# Patient Record
Sex: MALE | Race: WHITE | NOT HISPANIC OR LATINO | Employment: OTHER | URBAN - METROPOLITAN AREA
[De-identification: names, ages, dates, MRNs, and addresses within clinical notes are randomized per-mention and may not be internally consistent; named-entity substitution may affect disease eponyms.]

---

## 2017-07-20 ENCOUNTER — HOSPITAL ENCOUNTER (OUTPATIENT)
Dept: SLEEP MEDICINE | Facility: HOSPITAL | Age: 72
Discharge: HOME | End: 2017-07-20

## 2017-07-20 PROCEDURE — 99214 OFFICE O/P EST MOD 30 MIN: CPT | Performed by: INTERNAL MEDICINE

## 2017-07-23 PROBLEM — G47.33 OSA (OBSTRUCTIVE SLEEP APNEA): Status: ACTIVE | Noted: 2017-07-23

## 2018-07-09 ENCOUNTER — PREP FOR SURGERY (OUTPATIENT)
Dept: OTHER | Facility: HOSPITAL | Age: 73
End: 2018-07-09

## 2018-07-09 DIAGNOSIS — Z86.010 HX OF COLONIC POLYPS: ICD-10-CM

## 2018-07-09 DIAGNOSIS — Z83.71 FH: COLON POLYPS: ICD-10-CM

## 2018-07-09 DIAGNOSIS — Z80.0 FH: COLON CANCER: ICD-10-CM

## 2018-07-09 DIAGNOSIS — Z85.038 HX OF MALIGNANT NEOPLASM OF COLON: Primary | ICD-10-CM

## 2018-07-12 PROBLEM — Z83.71 FH: COLON POLYPS: Status: ACTIVE | Noted: 2018-07-12

## 2018-07-12 PROBLEM — Z86.010 HX OF COLONIC POLYPS: Status: ACTIVE | Noted: 2018-07-12

## 2018-07-12 PROBLEM — Z85.038 HX OF MALIGNANT NEOPLASM OF COLON: Status: ACTIVE | Noted: 2018-07-12

## 2018-07-12 PROBLEM — Z83.719 FH: COLON POLYPS: Status: ACTIVE | Noted: 2018-07-12

## 2018-07-12 PROBLEM — Z86.0100 HX OF COLONIC POLYPS: Status: ACTIVE | Noted: 2018-07-12

## 2018-07-12 PROBLEM — Z80.0 FH: COLON CANCER: Status: ACTIVE | Noted: 2018-07-12

## 2018-08-01 ENCOUNTER — TELEPHONE (OUTPATIENT)
Dept: GASTROENTEROLOGY | Facility: CLINIC | Age: 73
End: 2018-08-01

## 2018-08-01 NOTE — TELEPHONE ENCOUNTER
Left voice message for Dr Christine Jay MA, requesting cardiac clearance for patient to hold his Plavix for 5 days prior to a colonoscopy scheduled 8/17/18 with Dr Garcia.

## 2018-08-01 NOTE — TELEPHONE ENCOUNTER
----- Message from Ese Anand RN sent at 7/9/2018  3:57 PM EDT -----  Regarding: PLAVIX  Patient takes Plavix prescribed by Dr Flaco Stearns

## 2018-08-02 NOTE — TELEPHONE ENCOUNTER
Received a voice message from Misty GONZALES with Dr Stearns.  She said that Dr Stearns is on vacation but will return next week.  She will check with him regarding patient's Plavix and call back next week.

## 2018-08-08 NOTE — TELEPHONE ENCOUNTER
Spoke with patient and informed him that Dr Garcia recommends that he hold his Plavix for 5 days prior to his colonoscopy scheduled 8/17/18(and this was okayed by Dr Stearns).  Patient voiced understanding.

## 2018-08-08 NOTE — TELEPHONE ENCOUNTER
Received a voice message from Dr Christine Jay MA. Dr Stearns said that it is okay for patient to hold his Plavix for 5 days prior to his colonoscopy scheduled 8/17/18.

## 2018-08-17 ENCOUNTER — ANESTHESIA EVENT (OUTPATIENT)
Dept: GASTROENTEROLOGY | Facility: HOSPITAL | Age: 73
End: 2018-08-17

## 2018-08-17 ENCOUNTER — ANESTHESIA (OUTPATIENT)
Dept: GASTROENTEROLOGY | Facility: HOSPITAL | Age: 73
End: 2018-08-17

## 2018-08-17 ENCOUNTER — HOSPITAL ENCOUNTER (OUTPATIENT)
Facility: HOSPITAL | Age: 73
Setting detail: HOSPITAL OUTPATIENT SURGERY
Discharge: HOME | End: 2018-08-17
Attending: INTERNAL MEDICINE | Admitting: INTERNAL MEDICINE

## 2018-08-17 VITALS
SYSTOLIC BLOOD PRESSURE: 117 MMHG | DIASTOLIC BLOOD PRESSURE: 60 MMHG | BODY MASS INDEX: 29.2 KG/M2 | OXYGEN SATURATION: 97 % | HEART RATE: 48 BPM | WEIGHT: 197.19 LBS | HEIGHT: 69 IN | RESPIRATION RATE: 16 BRPM | TEMPERATURE: 98.1 F

## 2018-08-17 DIAGNOSIS — Z86.010 HX OF COLONIC POLYPS: ICD-10-CM

## 2018-08-17 DIAGNOSIS — Z85.038 HX OF MALIGNANT NEOPLASM OF COLON: ICD-10-CM

## 2018-08-17 DIAGNOSIS — Z83.71 FH: COLON POLYPS: ICD-10-CM

## 2018-08-17 DIAGNOSIS — Z80.0 FH: COLON CANCER: ICD-10-CM

## 2018-08-17 PROCEDURE — 25010000002 PHENYLEPHRINE PER 1 ML: Performed by: ANESTHESIOLOGY

## 2018-08-17 PROCEDURE — 25010000002 PROPOFOL 10 MG/ML EMULSION: Performed by: ANESTHESIOLOGY

## 2018-08-17 PROCEDURE — S0260 H&P FOR SURGERY: HCPCS | Performed by: INTERNAL MEDICINE

## 2018-08-17 PROCEDURE — 45380 COLONOSCOPY AND BIOPSY: CPT | Performed by: INTERNAL MEDICINE

## 2018-08-17 PROCEDURE — 88305 TISSUE EXAM BY PATHOLOGIST: CPT | Performed by: INTERNAL MEDICINE

## 2018-08-17 RX ORDER — LIDOCAINE HYDROCHLORIDE 20 MG/ML
INJECTION, SOLUTION INFILTRATION; PERINEURAL AS NEEDED
Status: DISCONTINUED | OUTPATIENT
Start: 2018-08-17 | End: 2018-08-17 | Stop reason: SURG

## 2018-08-17 RX ORDER — SODIUM CHLORIDE, SODIUM LACTATE, POTASSIUM CHLORIDE, CALCIUM CHLORIDE 600; 310; 30; 20 MG/100ML; MG/100ML; MG/100ML; MG/100ML
1000 INJECTION, SOLUTION INTRAVENOUS CONTINUOUS
Status: DISCONTINUED | OUTPATIENT
Start: 2018-08-17 | End: 2018-08-17 | Stop reason: HOSPADM

## 2018-08-17 RX ORDER — PROPOFOL 10 MG/ML
VIAL (ML) INTRAVENOUS CONTINUOUS PRN
Status: DISCONTINUED | OUTPATIENT
Start: 2018-08-17 | End: 2018-08-17 | Stop reason: SURG

## 2018-08-17 RX ORDER — PROPOFOL 10 MG/ML
VIAL (ML) INTRAVENOUS AS NEEDED
Status: DISCONTINUED | OUTPATIENT
Start: 2018-08-17 | End: 2018-08-17 | Stop reason: SURG

## 2018-08-17 RX ADMIN — PROPOFOL 300 MCG/KG/MIN: 10 INJECTION, EMULSION INTRAVENOUS at 08:42

## 2018-08-17 RX ADMIN — SODIUM CHLORIDE, POTASSIUM CHLORIDE, SODIUM LACTATE AND CALCIUM CHLORIDE 1000 ML: 600; 310; 30; 20 INJECTION, SOLUTION INTRAVENOUS at 08:19

## 2018-08-17 RX ADMIN — EPHEDRINE SULFATE 20 MG: 50 INJECTION INTRAMUSCULAR; INTRAVENOUS; SUBCUTANEOUS at 08:59

## 2018-08-17 RX ADMIN — LIDOCAINE HYDROCHLORIDE 40 MG: 20 INJECTION, SOLUTION INFILTRATION; PERINEURAL at 08:42

## 2018-08-17 RX ADMIN — SODIUM CHLORIDE, POTASSIUM CHLORIDE, SODIUM LACTATE AND CALCIUM CHLORIDE: 600; 310; 30; 20 INJECTION, SOLUTION INTRAVENOUS at 08:39

## 2018-08-17 RX ADMIN — EPHEDRINE SULFATE 10 MG: 50 INJECTION INTRAMUSCULAR; INTRAVENOUS; SUBCUTANEOUS at 08:47

## 2018-08-17 RX ADMIN — EPHEDRINE SULFATE 20 MG: 50 INJECTION INTRAMUSCULAR; INTRAVENOUS; SUBCUTANEOUS at 08:54

## 2018-08-17 RX ADMIN — PHENYLEPHRINE HYDROCHLORIDE 100 MCG: 10 INJECTION INTRAVENOUS at 08:59

## 2018-08-17 RX ADMIN — PROPOFOL 100 MG: 10 INJECTION, EMULSION INTRAVENOUS at 08:42

## 2018-08-17 NOTE — H&P
[facility]  Pre Procedure History & Physical    Chief Complaint:   History of colon cancer and colon polyps    Subjective     HPI:   Patient 73-year-old male with history of hypertension, hyperlipidemia, arthritis and colon cancer here for surveillance.  Patient reports no GI complaints here for colonoscopy    Past Medical History:   Past Medical History:   Diagnosis Date   • Arthritis    • Cancer (CMS/HCC)     COLON, PROSTATE, SKIN   • Hyperlipidemia    • Hypertension        Past Surgical History:  Past Surgical History:   Procedure Laterality Date   • CARDIAC SURGERY      STENT   • COLON SURGERY      COLON RESECTION, COLON CANCER REMOVAL   • PROSTATE SURGERY      CANCER   • SKIN BIOPSY      5X-BASAL CELL CARCINOMA       Family History:  History reviewed. No pertinent family history.    Social History:   reports that he quit smoking about 40 years ago. His smoking use included Cigarettes. He has a 7.50 pack-year smoking history. He has never used smokeless tobacco. He reports that he drinks alcohol. He reports that he does not use drugs.    Medications:   Prescriptions Prior to Admission   Medication Sig Dispense Refill Last Dose   • atenolol (TENORMIN) 25 MG tablet Take 25 mg by mouth Daily.   8/17/2018 at Unknown time   • atorvastatin (LIPITOR) 10 MG tablet Take 10 mg by mouth Daily.   8/17/2018 at Unknown time   • buPROPion SR (WELLBUTRIN SR) 150 MG 12 hr tablet Take 150 mg by mouth 2 (Two) Times a Day.   8/17/2018 at Unknown time   • ezetimibe (ZETIA) 10 MG tablet Take 10 mg by mouth Daily.   8/16/2018 at Unknown time   • pantoprazole (PROTONIX) 40 MG EC tablet Take 40 mg by mouth Daily.   8/17/2018 at Unknown time   • ramipril (ALTACE) 10 MG capsule Take 10 mg by mouth Daily.   8/16/2018 at Unknown time   • aspirin 81 MG chewable tablet Chew 81 mg Daily.   8/15/2018   • azithromycin (ZITHROMAX Z-ERIK) 250 MG tablet Take 2 tablets the first day, then 1 tablet daily for 4 days. 6 tablet 0  "   • benzonatate (TESSALON) 200 MG capsule Take 1 capsule by mouth 3 (Three) Times a Day As Needed for cough. 30 capsule 0    • Cholecalciferol (VITAMIN D-3) 1000 UNITS capsule Take 2,000 Units by mouth.   8/15/2018   • clopidogrel (PLAVIX) 75 MG tablet Take 75 mg by mouth Daily.   8/12/2018   • Coenzyme Q10 (CO Q 10) 10 MG capsule Take  by mouth.   8/15/2018   • folic acid (FOLVITE) 1 MG tablet Take 1 mg by mouth Daily.   8/15/2018   • glucosamine-chondroitin 500-400 MG capsule capsule Take  by mouth 3 (Three) Times a Day With Meals.   8/15/2018   • Multiple Vitamins-Minerals (CENTRUM SILVER ADULT 50+ PO) Take  by mouth.   8/15/2018   • Omega-3 Fatty Acids (FISH OIL) 1000 MG capsule capsule Take  by mouth Daily With Breakfast.   8/15/2018   • PROAIR RESPICLICK 108 (90 BASE) MCG/ACT inhaler 1-2 inhalations every 6-8 hours as needed 1 inhaler 0    • vitamin B-12 (CYANOCOBALAMIN) 100 MCG tablet Take 50 mcg by mouth Daily.   8/15/2018   • vitamin B-6 (PYRIDOXINE) 50 MG tablet Take 50 mg by mouth Daily.   8/15/2018       Allergies:  Iodine and Latex    ROS:    Pertinent items are noted in HPI     Objective     Blood pressure 138/76, pulse (!) 49, temperature 98.1 °F (36.7 °C), temperature source Oral, resp. rate 16, height 175.3 cm (69\"), weight 89.4 kg (197 lb 3 oz), SpO2 96 %.    Physical Exam   Constitutional: Pt is oriented to person, place, and time and well-developed, well-nourished, and in no distress.   Mouth/Throat: Oropharynx is clear and moist.   Neck: Normal range of motion.   Cardiovascular: Normal rate, regular rhythm and normal heart sounds.    Pulmonary/Chest: Effort normal and breath sounds normal.   Abdominal: Soft. Nontender  Skin: Skin is warm and dry.   Psychiatric: Mood, memory, affect and judgment normal.     Assessment/Plan     Diagnosis:  History colon cancer and colon polyps    Anticipated Surgical Procedure:  Colonoscopy    The risks, benefits, and alternatives of this procedure have been " discussed with the patient or the responsible party- the patient understands and agrees to proceed.

## 2018-08-17 NOTE — BRIEF OP NOTE
COLONOSCOPY  Progress Note    Jed Correia  8/17/2018    Pre-op Diagnosis:   Hx of colonic polyps [Z86.010]  FH: colon cancer [Z80.0]  FH: colon polyps [Z83.71]  Hx of malignant neoplasm of colon [Z85.038]       Post-Op Diagnosis Codes:     * Hx of colonic polyps [Z86.010]     * FH: colon cancer [Z80.0]     * FH: colon polyps [Z83.71]     * Hx of malignant neoplasm of colon [Z85.038]     * Colon polyp [K63.5]     * Internal hemorrhoids without complication [K64.8]    Procedure/CPT® Codes:      Procedure(s):  COLONOSCOPY TO CECUM/TI WITH POLYPECTOMY ( COLD BX)    Surgeon(s):  Moise Garcia MD    Anesthesia: Monitor Anesthesia Care    Staff:   Endo Technician: Ashwini Nicholson  Endo Nurse: Yenny Mullen RN    Estimated Blood Loss: minimal    Urine Voided: * No values recorded between 8/17/2018  8:40 AM and 8/17/2018  9:01 AM *    Specimens:                ID Type Source Tests Collected by Time   A : ASCENDING COLON POLYP (COLD BX) Polyp Large Intestine, Right / Ascending Colon TISSUE PATHOLOGY EXAM Moise Garcia MD 8/17/2018 0854         Drains:      Findings: Colon polyp, status post sigmoid resection and internal hemorrhoids    Complications: None      Moise Garcia MD     Date: 8/17/2018  Time: 9:01 AM

## 2018-08-17 NOTE — DISCHARGE INSTRUCTIONS
For the next 24 hours patient needs to be with a responsible adult.    For 24 hours DO NOT drive, operate machinery, appliances, drink alcohol, make important decisions or sign legal documents.    Start with a light or bland diet and advance to regular diet as tolerated.    Follow recommendations on procedure report provided by your doctor.    Call Dr. Garcia for problems [phone]    Problems may include but not limited to: large amounts of bleeding, trouble breathing, repeated vomiting, severe unrelieved pain, fever or chills.

## 2018-08-17 NOTE — ANESTHESIA PREPROCEDURE EVALUATION
Anesthesia Evaluation     Patient summary reviewed and Nursing notes reviewed                Airway   Mallampati: II  TM distance: >3 FB  Neck ROM: full  No difficulty expected  Dental - normal exam     Pulmonary - normal exam   (+) a smoker Former, sleep apnea,   Cardiovascular     Rhythm: regular  Rate: abnormal    (+) hypertension, CAD, cardiac stents more than 12 months ago hyperlipidemia,     ROS comment: 2 stents in series (3 yrs ago)  PE comment: SB/rate 46    Neuro/Psych  GI/Hepatic/Renal/Endo      Musculoskeletal     Abdominal  - normal exam   Substance History      OB/GYN          Other   (+) arthritis   history of cancer      Other Comment: Hx of prostate and colon CA                Anesthesia Plan    ASA 3     MAC     intravenous induction   Anesthetic plan and risks discussed with patient.

## 2018-08-17 NOTE — ANESTHESIA POSTPROCEDURE EVALUATION
Patient: Jed Correia    Procedure Summary     Date:  08/17/18 Room / Location:   [facility]    Anesthesia Start:  0839 Anesthesia Stop:  0906    Procedure:  COLONOSCOPY TO CECUM/TI WITH POLYPECTOMY ( COLD BX) (N/A ) Diagnosis:       Hx of colonic polyps      FH: colon cancer      FH: colon polyps      Hx of malignant neoplasm of colon      Colon polyp      Internal hemorrhoids without complication      (Hx of colonic polyps [Z86.010])      (FH: colon cancer [Z80.0])      (FH: colon polyps [Z83.71])      (Hx of malignant neoplasm of colon [Z85.038])    Surgeon:  Moise Garcia MD Provider:  Mario Mendez MD    Anesthesia Type:  MAC ASA Status:  3          Anesthesia Type: MAC  Last vitals  BP   138/76 (08/17/18 0810)   Temp   36.7 °C (98.1 °F) (08/17/18 0810)   Pulse   55 (08/17/18 0905)   Resp   16 (08/17/18 0905)     SpO2   97 % (08/17/18 0905)     Post Anesthesia Care and Evaluation    Patient location during evaluation: PHASE II  Patient participation: complete - patient participated  Level of consciousness: awake and alert  Pain management: adequate  Airway patency: patent  Anesthetic complications: No anesthetic complications  PONV Status: none  Cardiovascular status: acceptable  Respiratory status: acceptable  Hydration status: acceptable

## 2018-08-20 LAB
CYTO UR: NORMAL
LAB AP CASE REPORT: NORMAL
PATH REPORT.FINAL DX SPEC: NORMAL
PATH REPORT.GROSS SPEC: NORMAL

## 2018-08-30 ENCOUNTER — TELEPHONE (OUTPATIENT)
Dept: GASTROENTEROLOGY | Facility: CLINIC | Age: 73
End: 2018-08-30

## 2018-08-30 NOTE — TELEPHONE ENCOUNTER
----- Message from Moise Garcia MD sent at 8/27/2018 10:42 AM EDT -----  Polyp benign adenoma.  Repeat colonoscopy 5 years as discussed.

## 2018-08-30 NOTE — TELEPHONE ENCOUNTER
Patient's health maintenance record updated to reflect the need to repeat colonoscopy in 5 years.

## 2019-08-29 ENCOUNTER — OFFICE VISIT (OUTPATIENT)
Dept: SLEEP MEDICINE | Facility: HOSPITAL | Age: 74
End: 2019-08-29

## 2019-08-29 VITALS — WEIGHT: 207.4 LBS | HEIGHT: 69 IN | BODY MASS INDEX: 30.72 KG/M2

## 2019-08-29 DIAGNOSIS — G47.39 POSITIONAL SLEEP APNEA: Primary | ICD-10-CM

## 2019-08-29 PROCEDURE — G0463 HOSPITAL OUTPT CLINIC VISIT: HCPCS

## 2019-08-29 PROCEDURE — 99213 OFFICE O/P EST LOW 20 MIN: CPT | Performed by: INTERNAL MEDICINE

## 2019-08-29 NOTE — PROGRESS NOTES
"[facility] Up [facility] Note     Chief Complaint:  SHARA     Primary Care Physician: Myke Landaverde MD    Interval History:   I last saw the patient in 2017.  He is using an oral appliance for his obstructive sleep apnea.  He states he has been very stable with the OA device.  However, it has developed a crack.  He needs a replacement.  He goes to bed between 11 11:30 PM and awakens between 7 and 7:30 AM.  He will use the bathroom during the nighttime.  [place] Sleepiness Scale is normal at 5.    Review of Systems:    A complete review of systems was done and all were negative with the exception of the above    Social History:    Social History     Socioeconomic History   • Marital status:      Spouse name: Not on file   • Number of children: Not on file   • Years of education: Not on file   • Highest education level: Not on file   Tobacco Use   • Smoking status: Former Smoker     Packs/day: 0.50     Years: 15.00     Pack years: 7.50     Types: Cigarettes     Last attempt to quit:      Years since quittin.6   • Smokeless tobacco: Never Used   • Tobacco comment: QUIT 30 YEARS AGO   Substance and Sexual Activity   • Alcohol use: Yes     Comment: SOCIALLY   • Drug use: No   • Sexual activity: Defer       Allergies:  Iodine and Latex     Medication Review: His list was reviewed.      Vital Signs:    Vitals:    19 1151   Weight: 94.1 kg (207 lb 6.4 oz)   Height: 175.3 cm (69\")     Body mass index is 30.63 kg/m².    Physical Exam:    Constitutional:  Well developed 74 y.o. male that appears in no apparent distress.  Awake & oriented times 3.  Normal mood with normal recent and remote memory and normal judgement.  Eyes:  Conjunctivae normal.  Oropharynx:  moist mucous membranes without exudate and a large tongue and normal uvula and mild-moderate narrowing of the posterior pharyngeal opening and class III MP airway     Results Review: Overnight polysomnogram 2009, weight 199 " pounds, AHI for total sleep time was normal at 4.6 events per hour.  When supine for 99 minutes, AHI was moderately abnormal at 16 events per hour.  No sleep-related hypoxia noted.       Impression:   Moderate severity obstructive sleep apnea in the supine position that appears to be adequately treated with an oral appliance and no complaints of hypersomnolence.    Plan:  Good sleep hygiene measures should be maintained.  Weight loss would be beneficial in this patient who is obese by BMI.  The patient is benefiting from the treatment being provided.     The patient states he needs a new oral appliance since his is cracking.  Orders for a new oral appliance sent to Dr. Rodolfo Bocanegra.  Dr Bocanegra reported that the patient needs an updated home sleep study.  A home sleep study will be scheduled.  A repeat home sleep study should be performed once the new oral appliance is fabricated.    The patient will call for any problems and will follow up as needed       Nayan Finney MD  Sleep Medicine  08/29/19  12:14 PM

## 2019-08-31 PROBLEM — G47.39 POSITIONAL SLEEP APNEA: Status: ACTIVE | Noted: 2017-07-23

## 2019-09-03 ENCOUNTER — TRANSCRIBE ORDERS (OUTPATIENT)
Dept: SLEEP MEDICINE | Facility: HOSPITAL | Age: 74
End: 2019-09-03

## 2019-09-03 DIAGNOSIS — G47.33 OSA (OBSTRUCTIVE SLEEP APNEA): Primary | ICD-10-CM

## 2019-09-18 ENCOUNTER — HOSPITAL ENCOUNTER (OUTPATIENT)
Dept: SLEEP MEDICINE | Facility: HOSPITAL | Age: 74
Discharge: HOME | End: 2019-09-18
Admitting: INTERNAL MEDICINE

## 2019-09-18 DIAGNOSIS — G47.33 OSA (OBSTRUCTIVE SLEEP APNEA): ICD-10-CM

## 2019-09-18 PROCEDURE — 95806 SLEEP STUDY UNATT&RESP EFFT: CPT

## 2019-09-18 PROCEDURE — 95806 SLEEP STUDY UNATT&RESP EFFT: CPT | Performed by: INTERNAL MEDICINE

## 2019-10-02 ENCOUNTER — TELEPHONE (OUTPATIENT)
Dept: SLEEP MEDICINE | Facility: HOSPITAL | Age: 74
End: 2019-10-02

## 2019-11-14 ENCOUNTER — OFFICE VISIT (OUTPATIENT)
Dept: SLEEP MEDICINE | Facility: HOSPITAL | Age: 74
End: 2019-11-14

## 2019-11-14 VITALS — WEIGHT: 209.8 LBS | HEIGHT: 69 IN | BODY MASS INDEX: 31.07 KG/M2

## 2019-11-14 DIAGNOSIS — G47.39 POSITIONAL SLEEP APNEA: Primary | ICD-10-CM

## 2019-11-14 PROCEDURE — 99213 OFFICE O/P EST LOW 20 MIN: CPT | Performed by: INTERNAL MEDICINE

## 2019-11-14 PROCEDURE — G0463 HOSPITAL OUTPT CLINIC VISIT: HCPCS

## 2019-11-14 NOTE — PROGRESS NOTES
"[facility] Note     Chief Complaint: Positional SHARA     Primary Care Physician: Myke Landaverde MD    Interval History:   I last saw the patient in August.  He is in need of a new oral appliance.  Repeat home sleep study performed 2019.  AHI for total sleep time normal at 2 events per hour.  When supine, mildly abnormal at 6.4 events per hour.  No sleep-related hypoxia.    The patient is unchanged from my previous encounter.    Review of Systems:    A complete review of systems was done and all were negative with the exception of knee pain which is new since last visit and he has an appointment to be checked.    Social History:    Social History     Socioeconomic History   • Marital status:      Spouse name: Not on file   • Number of children: Not on file   • Years of education: Not on file   • Highest education level: Not on file   Tobacco Use   • Smoking status: Former Smoker     Packs/day: 0.50     Years: 15.00     Pack years: 7.50     Types: Cigarettes     Last attempt to quit:      Years since quittin.8   • Smokeless tobacco: Never Used   • Tobacco comment: QUIT 30 YEARS AGO   Substance and Sexual Activity   • Alcohol use: Yes     Comment: SOCIALLY   • Drug use: No   • Sexual activity: Defer       Allergies:  Iodine and Latex     Medication Review:  Reviewed.      Vital Signs:    Vitals:    19 1112   Weight: 95.2 kg (209 lb 12.8 oz)   Height: 175.3 cm (69\")     Body mass index is 30.98 kg/m².    Physical Exam:    Constitutional:  Well developed 74 y.o. male that appears in no apparent distress.  Awake & oriented times 3.  Normal mood with normal recent and remote memory and normal judgement.  Eyes:  Conjunctivae normal.  Oropharynx:  moist mucous membranes without exudate and a large tongue and normal uvula and mild-moderate narrowing of posterior pharyngeal opening and class III MP airway     Results Review: I reviewed the results of the home sleep study " with him.     Impression:   Mild positional obstructive sleep apnea, normal AHI for total monitoring time, home sleep study 9/18/2019.  No significant complaints of hypersomnolence.    Plan:  Good sleep hygiene measures should be maintained.  Weight loss would be beneficial in this patient who is obese by BMI.       The patient will continue to work on obtaining a new oral appliance    The patient will call for any problems and will follow up as needed       Nayan Finney MD  Sleep Medicine  11/14/19  11:16 AM

## 2019-11-16 PROBLEM — G47.39 POSITIONAL SLEEP APNEA: Status: ACTIVE | Noted: 2019-09-18

## 2019-12-02 ENCOUNTER — OFFICE VISIT (OUTPATIENT)
Dept: ORTHOPEDIC SURGERY | Facility: CLINIC | Age: 74
End: 2019-12-02

## 2019-12-02 VITALS — TEMPERATURE: 98.7 F | WEIGHT: 211.8 LBS | HEIGHT: 69 IN | BODY MASS INDEX: 31.37 KG/M2

## 2019-12-02 DIAGNOSIS — M25.561 RIGHT KNEE PAIN, UNSPECIFIED CHRONICITY: Primary | ICD-10-CM

## 2019-12-02 PROCEDURE — 73562 X-RAY EXAM OF KNEE 3: CPT | Performed by: NURSE PRACTITIONER

## 2019-12-02 PROCEDURE — 99214 OFFICE O/P EST MOD 30 MIN: CPT | Performed by: NURSE PRACTITIONER

## 2019-12-02 RX ORDER — NITROGLYCERIN 0.4 MG/1
0.4 TABLET SUBLINGUAL
Refills: 1 | COMMUNITY
Start: 2019-10-15

## 2019-12-02 RX ORDER — LORATADINE 10 MG/1
10 TABLET ORAL DAILY
COMMUNITY
End: 2020-01-10

## 2019-12-02 RX ORDER — AMLODIPINE BESYLATE 2.5 MG/1
2.5 TABLET ORAL DAILY
Refills: 3 | COMMUNITY
Start: 2019-10-12 | End: 2022-12-21

## 2019-12-02 NOTE — PROGRESS NOTES
Patient: Jed Correia  YOB: 1945 74 y.o. male  Medical Record Number: [id number]    Chief Complaints:   Chief Complaint   Patient presents with   • Right Knee - Establish Care       History of Present Illness:Jed Correia is a 74 y.o. male who presents with new complaint of right knee pain.  Apparently the patient was in [place] about a month ago did quite a bit of walking as well as extended periods of time of sitting, he thinks he may have also inadvertently twisted his knee at that time.  At that point started with moderate to severe constant stabbing type pain in that knee with clicking swelling feelings of instability worse with standing walking, better with ice.    Allergies:   Allergies   Allergen Reactions   • Iodine Rash   • Latex Rash       Medications:   Current Outpatient Medications   Medication Sig Dispense Refill   • amLODIPine (NORVASC) 2.5 MG tablet Take  by mouth Daily.  3   • aspirin 81 MG chewable tablet Chew 325 mg Daily.     • atorvastatin (LIPITOR) 10 MG tablet Take 10 mg by mouth Daily.     • Cholecalciferol (VITAMIN D-3) 1000 UNITS capsule Take 2,000 Units by mouth.     • clopidogrel (PLAVIX) 75 MG tablet Take 75 mg by mouth Daily.     • Coenzyme Q10 (CO Q 10) 10 MG capsule Take  by mouth.     • ezetimibe (ZETIA) 10 MG tablet Take 10 mg by mouth Daily.     • folic acid (FOLVITE) 1 MG tablet Take 1 mg by mouth Daily.     • glucosamine-chondroitin 500-400 MG capsule capsule Take  by mouth 3 (Three) Times a Day With Meals.     • loratadine (CLARITIN) 10 MG tablet Take 10 mg by mouth Daily.     • Multiple Vitamins-Minerals (CENTRUM SILVER ADULT 50+ PO) Take  by mouth.     • nitroglycerin (NITROSTAT) 0.4 MG SL tablet PLACE 1 T UNDER TONGUE EVERY 6 MINUTES PRN FOR CHEST PAIN  1   • Omega-3 Fatty Acids (FISH OIL) 1000 MG capsule capsule Take  by mouth Daily With Breakfast.     • pantoprazole (PROTONIX) 40 MG EC tablet Take 40 mg by mouth Daily.     • Probiotic Product (PROBIOTIC DAILY  "PO) Take  by mouth.     • ramipril (ALTACE) 10 MG capsule Take 10 mg by mouth Daily.     • vitamin B-12 (CYANOCOBALAMIN) 100 MCG tablet Take 50 mcg by mouth Daily.     • vitamin B-6 (PYRIDOXINE) 50 MG tablet Take 50 mg by mouth Daily.     • atenolol (TENORMIN) 25 MG tablet Take 25 mg by mouth Daily.     • azithromycin (ZITHROMAX Z-ERIK) 250 MG tablet Take 2 tablets the first day, then 1 tablet daily for 4 days. 6 tablet 0   • benzonatate (TESSALON) 200 MG capsule Take 1 capsule by mouth 3 (Three) Times a Day As Needed for cough. 30 capsule 0   • buPROPion SR (WELLBUTRIN SR) 150 MG 12 hr tablet Take 150 mg by mouth 2 (Two) Times a Day.     • PROAIR RESPICLICK 108 (90 BASE) MCG/ACT inhaler 1-2 inhalations every 6-8 hours as needed 1 inhaler 0     No current facility-administered medications for this visit.          The following portions of the patient's history were reviewed and updated as appropriate: allergies, current medications, past family history, past medical history, past social history, past surgical history and problem list.    Review of Systems:   A 14 point review of systems was performed. All systems negative except pertinent positives/negative listed in HPI above    Physical Exam:   Vitals:    12/02/19 0926   Temp: 98.7 °F (37.1 °C)   TempSrc: Temporal   Weight: 96.1 kg (211 lb 12.8 oz)   Height: 175.3 cm (69\")   PainSc:   5   PainLoc: Knee       General: A and O x 3, ASA, NAD    SCLERA:    Normal    DENTITION:   Normal  Skin clear no unusual lesions noted  Right knee patient does have trace amount of effusion noted with 110 degrees flexion neutral and extension with a positive medial Taye negative Lockman calf soft nontender    Radiology:  Xrays 3views (ap,lateral, sunrise) reviewed today secondary to pain and show some mild arthritic changes.  No compared to views available    Assessment/Plan:  Right knee pain following injury with mechanical symptoms    We will proceed with an MRI of the right " knee to further evaluate and patient will call couple days after regarding results and treatment options

## 2019-12-06 ENCOUNTER — HOSPITAL ENCOUNTER (OUTPATIENT)
Dept: MRI IMAGING | Facility: HOSPITAL | Age: 74
Discharge: HOME | End: 2019-12-06
Admitting: NURSE PRACTITIONER

## 2019-12-06 DIAGNOSIS — M25.561 RIGHT KNEE PAIN, UNSPECIFIED CHRONICITY: ICD-10-CM

## 2019-12-06 PROCEDURE — 73721 MRI JNT OF LWR EXTRE W/O DYE: CPT

## 2019-12-20 ENCOUNTER — CONSULT (OUTPATIENT)
Dept: ORTHOPEDIC SURGERY | Facility: CLINIC | Age: 74
End: 2019-12-20

## 2019-12-20 VITALS — HEIGHT: 69 IN | TEMPERATURE: 98.5 F | WEIGHT: 207.8 LBS | BODY MASS INDEX: 30.78 KG/M2

## 2019-12-20 DIAGNOSIS — S83.241D OTHER TEAR OF MEDIAL MENISCUS OF RIGHT KNEE AS CURRENT INJURY, SUBSEQUENT ENCOUNTER: Primary | ICD-10-CM

## 2019-12-20 PROBLEM — S83.241A TEAR OF MEDIAL MENISCUS OF RIGHT KNEE, CURRENT: Status: ACTIVE | Noted: 2019-12-20

## 2019-12-20 PROCEDURE — 99214 OFFICE O/P EST MOD 30 MIN: CPT | Performed by: ORTHOPAEDIC SURGERY

## 2019-12-20 RX ORDER — CEFAZOLIN SODIUM 2 G/100ML
2 INJECTION, SOLUTION INTRAVENOUS ONCE
Status: CANCELLED | OUTPATIENT
Start: 2020-01-13 | End: 2019-12-20

## 2019-12-20 NOTE — PROGRESS NOTES
New right Knee      Patient: Jed Correia        YOB: 1945    Medical Record Number: [id number]        Chief Complaints: right knee pain      History of Present Illness: This is a   74-year-old active male who presents complaining of several week history of right knee pain does not recall a particular event or injury that started this his symptoms are moderate to severe constant stabbing aching swelling clicking worse with standing walking and sleeping somewhat better with ice he is retired his past medical history is remarkable for coronary artery disease hyperlipidemia hypertension: Prostate and skin cancer and arthritis      Allergies:   Allergies   Allergen Reactions   • Iodine Rash   • Latex Rash       Medications:   Home Medications:  Current Outpatient Medications on File Prior to Visit   Medication Sig   • amLODIPine (NORVASC) 2.5 MG tablet Take  by mouth Daily.   • aspirin 81 MG chewable tablet Chew 325 mg Daily.   • atenolol (TENORMIN) 25 MG tablet Take 25 mg by mouth Daily.   • atorvastatin (LIPITOR) 10 MG tablet Take 10 mg by mouth Daily.   • azithromycin (ZITHROMAX Z-ERIK) 250 MG tablet Take 2 tablets the first day, then 1 tablet daily for 4 days.   • benzonatate (TESSALON) 200 MG capsule Take 1 capsule by mouth 3 (Three) Times a Day As Needed for cough.   • buPROPion SR (WELLBUTRIN SR) 150 MG 12 hr tablet Take 150 mg by mouth 2 (Two) Times a Day.   • Cholecalciferol (VITAMIN D-3) 1000 UNITS capsule Take 2,000 Units by mouth.   • clopidogrel (PLAVIX) 75 MG tablet Take 75 mg by mouth Daily.   • Coenzyme Q10 (CO Q 10) 10 MG capsule Take  by mouth.   • ezetimibe (ZETIA) 10 MG tablet Take 10 mg by mouth Daily.   • folic acid (FOLVITE) 1 MG tablet Take 1 mg by mouth Daily.   • glucosamine-chondroitin 500-400 MG capsule capsule Take  by mouth 3 (Three) Times a Day With Meals.   • loratadine (CLARITIN) 10 MG tablet Take 10 mg by mouth Daily.   • Multiple Vitamins-Minerals (CENTRUM SILVER ADULT  50+ PO) Take  by mouth.   • nitroglycerin (NITROSTAT) 0.4 MG SL tablet PLACE 1 T UNDER TONGUE EVERY 6 MINUTES PRN FOR CHEST PAIN   • Omega-3 Fatty Acids (FISH OIL) 1000 MG capsule capsule Take  by mouth Daily With Breakfast.   • pantoprazole (PROTONIX) 40 MG EC tablet Take 40 mg by mouth Daily.   • PROAIR RESPICLICK 108 (90 BASE) MCG/ACT inhaler 1-2 inhalations every 6-8 hours as needed   • Probiotic Product (PROBIOTIC DAILY PO) Take  by mouth.   • ramipril (ALTACE) 10 MG capsule Take 10 mg by mouth Daily.   • vitamin B-12 (CYANOCOBALAMIN) 100 MCG tablet Take 50 mcg by mouth Daily.   • vitamin B-6 (PYRIDOXINE) 50 MG tablet Take 50 mg by mouth Daily.     No current facility-administered medications on file prior to visit.      Current Medications:  Scheduled Meds:  Continuous Infusions:  No current facility-administered medications for this visit.   PRN Meds:.    Past Medical History:   Diagnosis Date   • Arthritis    • Cancer (CMS/HCC)     COLON, PROSTATE, SKIN   • Hyperlipidemia    • Hypertension    • Positional sleep apnea 2019    Home sleep study.  AHI normal at 2/h for total monitoring time.  When supine, mildly abnormal at 6.4 events per hour.  No sleep-related hypoxia.        Past Surgical History:   Procedure Laterality Date   • CARDIAC SURGERY      STENT   • COLON SURGERY      COLON RESECTION, COLON CANCER REMOVAL   • COLONOSCOPY N/A 2018    Procedure: COLONOSCOPY TO CECUM/TI WITH POLYPECTOMY ( COLD BX);  Surgeon: Moise Garcia MD;  Location: [facility] ENDOSCOPY;  Service: Gastroenterology   • PROSTATE SURGERY      CANCER   • SKIN BIOPSY      5X-BASAL CELL CARCINOMA        Social History     Occupational History   • Not on file   Tobacco Use   • Smoking status: Former Smoker     Packs/day: 0.50     Years: 15.00     Pack years: 7.50     Types: Cigarettes     Last attempt to quit:      Years since quittin.9   • Smokeless tobacco: Never Used   • Tobacco comment: QUIT 30 YEARS AGO  "  Substance and Sexual Activity   • Alcohol use: Yes     Comment: SOCIALLY   • Drug use: No   • Sexual activity: Defer    Social History     Social History Narrative   • Not on file      No family history on file.          Review of Systems: 14 point review of systems are remarkable for the pertinent positives listed in the chart by the patient the remainder negative    Review of Systems      Physical Exam: 74 y.o. male  General Appearance:    Alert, cooperative, in no acute distress                 Vitals:    12/20/19 1052   Temp: 98.5 °F (36.9 °C)   TempSrc: Temporal   Weight: 94.3 kg (207 lb 12.8 oz)   Height: 175.3 cm (69\")      Patient is alert and read ×3 no acute distress appears her above-listed at height weight and age.  Affect is normal respiratory rate is normal unlabored. Heart rate regular rate rhythm, sclera, dentition and hearing are normal for the purpose of this exam.        Ortho Exam  Physical exam of the right  knee reveals no effusion no redness.  The patient does have tenderness about the medial l joint line.  No tenderness about the lateral l joint line.  A negative bounce home and a positive l medial Taye.    Patient has a stable ligamentous exam.  The patient has a negative Lachman and negative anterior drawer and a negative pivot shift.  Quads are reasonable and symmetric bilaterally.  Calf is soft and nontender.  There is no overlying skin changes no lymphedema lymphadenopathy.  Patient has good hip range of motion full symmetric and asymptomatic and a normal ankle exam.  She has good distal pulses and sensation distally is intact    Physical Exam: 74 y.o. male  General Appearance:    Alert, cooperative, in no acute distress                      Vitals:    12/20/19 1052   Temp: 98.5 °F (36.9 °C)   TempSrc: Temporal   Weight: 94.3 kg (207 lb 12.8 oz)   Height: 175.3 cm (69\")        Head:    Normocephalic, without obvious abnormality, atraumatic   Eyes:            conjunctivae and " sclerae normal, no pallor, corneas clear,    Ears:    Ears appear intact with no abnormalities noted   Throat:   No oral lesions, no thrush, oral mucosa moist   Neck:   No adenopathy, supple, trachea midline, no thyromegaly,    Back:     No kyphosis present, no scoliosis present, no skin lesions,      erythema or scars, no tenderness to percussion or                   palpation,   range of motion normal   Lungs:     Clear to auscultation,respirations regular, even and                  unlabored    Heart:    Regular rhythm and normal rate               Chest Wall:    No abnormalities observed               Pulses:   Pulses palpable and equal bilaterally   Skin:   No bleeding, bruising or rash   Lymph nodes:   No palpable adenopathy   Neurologic:   Appears neurologic intact       Procedures             Radiology:   AP, Lateral and merchant views of the right knee  were /reviewed to evauateknee pain.  These were taken by Gris Zavala I did review these he has some mild patellofemoral OA otherwise no acute bony pathology.  He also comes with an MRI which shows a flap tear of the posterior horn medial meniscus and body and some mild degenerative changes.  I have reviewed these and agree with the findings  Imaging Results (Most Recent)     None        Assessment/Plan:      Right knee pain with meniscus tear I think his exam and everything fits bothering him enough he wishes to proceed with arthroscopy.  We did discuss in detail risk benefits and alternatives The patient voiced understanding of the risks, benefits, and alternative forms of treatment that were discussed and the patient consents to proceed with the above listed surgery.  All risks, benefits and alternatives were discussed.  Risks including to but not exclusive to anesthetic complications, including death, MI, CVA, infection, bleeding DVT, fracture, residual pain and need for future surgery.  He understands these and agrees to proceed he does have coronary  artery disease and is currently on Plavix we will need preop clearance from his cardiologist and okay to stop his Plavix about 5 days before

## 2020-01-02 ENCOUNTER — TELEPHONE (OUTPATIENT)
Dept: ORTHOPEDIC SURGERY | Facility: CLINIC | Age: 75
End: 2020-01-02

## 2020-01-03 ENCOUNTER — LAB (OUTPATIENT)
Dept: LAB | Facility: HOSPITAL | Age: 75
End: 2020-01-03

## 2020-01-03 ENCOUNTER — TRANSCRIBE ORDERS (OUTPATIENT)
Dept: ADMINISTRATIVE | Facility: HOSPITAL | Age: 75
End: 2020-01-03

## 2020-01-03 DIAGNOSIS — Z01.818 PRE-OP TESTING: ICD-10-CM

## 2020-01-03 DIAGNOSIS — Z01.818 PRE-OP TESTING: Primary | ICD-10-CM

## 2020-01-03 LAB
ANION GAP SERPL CALCULATED.3IONS-SCNC: 10 MMOL/L (ref 5–15)
BASOPHILS # BLD AUTO: 0.07 10*3/MM3 (ref 0–0.2)
BASOPHILS NFR BLD AUTO: 0.9 % (ref 0–1.5)
BUN BLD-MCNC: 15 MG/DL (ref 8–23)
BUN/CREAT SERPL: 20.5 (ref 7–25)
CALCIUM SPEC-SCNC: 9.5 MG/DL (ref 8.6–10.5)
CHLORIDE SERPL-SCNC: 103 MMOL/L (ref 98–107)
CO2 SERPL-SCNC: 28 MMOL/L (ref 22–29)
CREAT BLD-MCNC: 0.73 MG/DL (ref 0.76–1.27)
DEPRECATED RDW RBC AUTO: 46.3 FL (ref 37–54)
EOSINOPHIL # BLD AUTO: 0.22 10*3/MM3 (ref 0–0.4)
EOSINOPHIL NFR BLD AUTO: 2.7 % (ref 0.3–6.2)
ERYTHROCYTE [DISTWIDTH] IN BLOOD BY AUTOMATED COUNT: 13.2 % (ref 12.3–15.4)
GFR SERPL CREATININE-BSD FRML MDRD: 105 ML/MIN/1.73
GLUCOSE BLD-MCNC: 120 MG/DL (ref 65–99)
HCT VFR BLD AUTO: 43.5 % (ref 37.5–51)
HGB BLD-MCNC: 14.2 G/DL (ref 13–17.7)
IMM GRANULOCYTES # BLD AUTO: 0.02 10*3/MM3 (ref 0–0.05)
IMM GRANULOCYTES NFR BLD AUTO: 0.2 % (ref 0–0.5)
LYMPHOCYTES # BLD AUTO: 1.82 10*3/MM3 (ref 0.7–3.1)
LYMPHOCYTES NFR BLD AUTO: 22.6 % (ref 19.6–45.3)
MCH RBC QN AUTO: 30.9 PG (ref 26.6–33)
MCHC RBC AUTO-ENTMCNC: 32.6 G/DL (ref 31.5–35.7)
MCV RBC AUTO: 94.8 FL (ref 79–97)
MONOCYTES # BLD AUTO: 1.01 10*3/MM3 (ref 0.1–0.9)
MONOCYTES NFR BLD AUTO: 12.5 % (ref 5–12)
NEUTROPHILS # BLD AUTO: 4.91 10*3/MM3 (ref 1.7–7)
NEUTROPHILS NFR BLD AUTO: 61.1 % (ref 42.7–76)
NRBC BLD AUTO-RTO: 0 /100 WBC (ref 0–0.2)
PLATELET # BLD AUTO: 222 10*3/MM3 (ref 140–450)
PMV BLD AUTO: 9.4 FL (ref 6–12)
POTASSIUM BLD-SCNC: 4.6 MMOL/L (ref 3.5–5.2)
RBC # BLD AUTO: 4.59 10*6/MM3 (ref 4.14–5.8)
SODIUM BLD-SCNC: 141 MMOL/L (ref 136–145)
WBC NRBC COR # BLD: 8.05 10*3/MM3 (ref 3.4–10.8)

## 2020-01-03 PROCEDURE — 85025 COMPLETE CBC W/AUTO DIFF WBC: CPT

## 2020-01-03 PROCEDURE — 36415 COLL VENOUS BLD VENIPUNCTURE: CPT

## 2020-01-03 PROCEDURE — 80048 BASIC METABOLIC PNL TOTAL CA: CPT

## 2020-01-06 NOTE — TELEPHONE ENCOUNTER
Wanted to make sure you got my message this jessica needs to be done at [facility] on Friday instead of [facility].  [facility] will not do this patient in outpatient setting

## 2020-01-10 ENCOUNTER — TELEPHONE (OUTPATIENT)
Dept: ORTHOPEDIC SURGERY | Facility: CLINIC | Age: 75
End: 2020-01-10

## 2020-01-10 RX ORDER — VENLAFAXINE 75 MG/1
75 TABLET ORAL DAILY
COMMUNITY

## 2020-01-10 NOTE — TELEPHONE ENCOUNTER
There is a telephone message in Care everywhere from cardiologist stating that he is cleared.  They also noted that they faxed a written clearance to office.  I do not see in under media, but check with [place] to see if it is in electronic fax

## 2020-01-10 NOTE — TELEPHONE ENCOUNTER
Have contacted [facility].  They have faxed over a letter of clearance.  Clearance letter has been scanned under media and outpatient surgery has been notified

## 2020-01-13 ENCOUNTER — ANESTHESIA (OUTPATIENT)
Dept: PERIOP | Facility: HOSPITAL | Age: 75
End: 2020-01-13

## 2020-01-13 ENCOUNTER — ANESTHESIA EVENT (OUTPATIENT)
Dept: PERIOP | Facility: HOSPITAL | Age: 75
End: 2020-01-13

## 2020-01-13 ENCOUNTER — HOSPITAL ENCOUNTER (OUTPATIENT)
Facility: HOSPITAL | Age: 75
Setting detail: HOSPITAL OUTPATIENT SURGERY
Discharge: HOME | End: 2020-01-13
Attending: ORTHOPAEDIC SURGERY | Admitting: ORTHOPAEDIC SURGERY

## 2020-01-13 VITALS
HEART RATE: 72 BPM | OXYGEN SATURATION: 97 % | TEMPERATURE: 98 F | RESPIRATION RATE: 16 BRPM | DIASTOLIC BLOOD PRESSURE: 75 MMHG | BODY MASS INDEX: 30.9 KG/M2 | WEIGHT: 209.22 LBS | SYSTOLIC BLOOD PRESSURE: 162 MMHG

## 2020-01-13 DIAGNOSIS — S83.241D OTHER TEAR OF MEDIAL MENISCUS OF RIGHT KNEE AS CURRENT INJURY, SUBSEQUENT ENCOUNTER: ICD-10-CM

## 2020-01-13 PROCEDURE — 25010000002 PROPOFOL 10 MG/ML EMULSION: Performed by: NURSE ANESTHETIST, CERTIFIED REGISTERED

## 2020-01-13 PROCEDURE — 25010000002 FENTANYL CITRATE (PF) 100 MCG/2ML SOLUTION: Performed by: ANESTHESIOLOGY

## 2020-01-13 PROCEDURE — 25010000002 DEXAMETHASONE PER 1 MG: Performed by: NURSE ANESTHETIST, CERTIFIED REGISTERED

## 2020-01-13 PROCEDURE — 25010000002 EPINEPHRINE PER 0.1 MG: Performed by: ORTHOPAEDIC SURGERY

## 2020-01-13 PROCEDURE — 25010000002 HYDROMORPHONE PER 4 MG: Performed by: ANESTHESIOLOGY

## 2020-01-13 PROCEDURE — 25010000002 FENTANYL CITRATE (PF) 100 MCG/2ML SOLUTION: Performed by: NURSE ANESTHETIST, CERTIFIED REGISTERED

## 2020-01-13 PROCEDURE — 25010000002 ONDANSETRON PER 1 MG: Performed by: NURSE ANESTHETIST, CERTIFIED REGISTERED

## 2020-01-13 PROCEDURE — 25010000003 CEFAZOLIN IN DEXTROSE 2-4 GM/100ML-% SOLUTION: Performed by: ORTHOPAEDIC SURGERY

## 2020-01-13 PROCEDURE — 29880 ARTHRS KNE SRG MNISECTMY M&L: CPT | Performed by: ORTHOPAEDIC SURGERY

## 2020-01-13 PROCEDURE — 25010000002 HYDRALAZINE PER 20 MG: Performed by: ANESTHESIOLOGY

## 2020-01-13 PROCEDURE — 25010000002 KETOROLAC TROMETHAMINE PER 15 MG: Performed by: NURSE ANESTHETIST, CERTIFIED REGISTERED

## 2020-01-13 RX ORDER — KETOROLAC TROMETHAMINE 30 MG/ML
INJECTION, SOLUTION INTRAMUSCULAR; INTRAVENOUS AS NEEDED
Status: DISCONTINUED | OUTPATIENT
Start: 2020-01-13 | End: 2020-01-13 | Stop reason: SURG

## 2020-01-13 RX ORDER — ONDANSETRON 2 MG/ML
INJECTION INTRAMUSCULAR; INTRAVENOUS AS NEEDED
Status: DISCONTINUED | OUTPATIENT
Start: 2020-01-13 | End: 2020-01-13 | Stop reason: SURG

## 2020-01-13 RX ORDER — LIDOCAINE HYDROCHLORIDE 20 MG/ML
INJECTION, SOLUTION INFILTRATION; PERINEURAL AS NEEDED
Status: DISCONTINUED | OUTPATIENT
Start: 2020-01-13 | End: 2020-01-13 | Stop reason: SURG

## 2020-01-13 RX ORDER — OXYCODONE AND ACETAMINOPHEN 7.5; 325 MG/1; MG/1
1 TABLET ORAL EVERY 4 HOURS PRN
Status: DISCONTINUED | OUTPATIENT
Start: 2020-01-13 | End: 2020-01-13 | Stop reason: HOSPADM

## 2020-01-13 RX ORDER — HYDROMORPHONE HYDROCHLORIDE 1 MG/ML
0.5 INJECTION, SOLUTION INTRAMUSCULAR; INTRAVENOUS; SUBCUTANEOUS
Status: DISCONTINUED | OUTPATIENT
Start: 2020-01-13 | End: 2020-01-13 | Stop reason: HOSPADM

## 2020-01-13 RX ORDER — SODIUM CHLORIDE, SODIUM LACTATE, POTASSIUM CHLORIDE, CALCIUM CHLORIDE 600; 310; 30; 20 MG/100ML; MG/100ML; MG/100ML; MG/100ML
9 INJECTION, SOLUTION INTRAVENOUS CONTINUOUS
Status: DISCONTINUED | OUTPATIENT
Start: 2020-01-13 | End: 2020-01-13 | Stop reason: HOSPADM

## 2020-01-13 RX ORDER — FAMOTIDINE 10 MG/ML
20 INJECTION, SOLUTION INTRAVENOUS ONCE
Status: COMPLETED | OUTPATIENT
Start: 2020-01-13 | End: 2020-01-13

## 2020-01-13 RX ORDER — EPHEDRINE SULFATE 50 MG/ML
5 INJECTION, SOLUTION INTRAVENOUS ONCE AS NEEDED
Status: DISCONTINUED | OUTPATIENT
Start: 2020-01-13 | End: 2020-01-13 | Stop reason: HOSPADM

## 2020-01-13 RX ORDER — HYDROCODONE BITARTRATE AND ACETAMINOPHEN 5; 325 MG/1; MG/1
1 TABLET ORAL EVERY 4 HOURS PRN
Qty: 40 TABLET | Refills: 0 | Status: SHIPPED | OUTPATIENT
Start: 2020-01-13 | End: 2022-12-21

## 2020-01-13 RX ORDER — FENTANYL CITRATE 50 UG/ML
INJECTION, SOLUTION INTRAMUSCULAR; INTRAVENOUS AS NEEDED
Status: DISCONTINUED | OUTPATIENT
Start: 2020-01-13 | End: 2020-01-13 | Stop reason: SURG

## 2020-01-13 RX ORDER — DEXAMETHASONE SODIUM PHOSPHATE 10 MG/ML
INJECTION INTRAMUSCULAR; INTRAVENOUS AS NEEDED
Status: DISCONTINUED | OUTPATIENT
Start: 2020-01-13 | End: 2020-01-13 | Stop reason: SURG

## 2020-01-13 RX ORDER — PROMETHAZINE HYDROCHLORIDE 25 MG/ML
6.25 INJECTION, SOLUTION INTRAMUSCULAR; INTRAVENOUS ONCE AS NEEDED
Status: DISCONTINUED | OUTPATIENT
Start: 2020-01-13 | End: 2020-01-13 | Stop reason: HOSPADM

## 2020-01-13 RX ORDER — PROPOFOL 10 MG/ML
VIAL (ML) INTRAVENOUS AS NEEDED
Status: DISCONTINUED | OUTPATIENT
Start: 2020-01-13 | End: 2020-01-13 | Stop reason: SURG

## 2020-01-13 RX ORDER — FLUMAZENIL 0.1 MG/ML
0.2 INJECTION INTRAVENOUS AS NEEDED
Status: DISCONTINUED | OUTPATIENT
Start: 2020-01-13 | End: 2020-01-13 | Stop reason: HOSPADM

## 2020-01-13 RX ORDER — ONDANSETRON 2 MG/ML
4 INJECTION INTRAMUSCULAR; INTRAVENOUS ONCE AS NEEDED
Status: DISCONTINUED | OUTPATIENT
Start: 2020-01-13 | End: 2020-01-13 | Stop reason: HOSPADM

## 2020-01-13 RX ORDER — PROMETHAZINE HYDROCHLORIDE 25 MG/1
25 SUPPOSITORY RECTAL ONCE AS NEEDED
Status: DISCONTINUED | OUTPATIENT
Start: 2020-01-13 | End: 2020-01-13 | Stop reason: HOSPADM

## 2020-01-13 RX ORDER — SODIUM CHLORIDE 0.9 % (FLUSH) 0.9 %
3-10 SYRINGE (ML) INJECTION AS NEEDED
Status: DISCONTINUED | OUTPATIENT
Start: 2020-01-13 | End: 2020-01-13 | Stop reason: HOSPADM

## 2020-01-13 RX ORDER — MIDAZOLAM HYDROCHLORIDE 1 MG/ML
2 INJECTION INTRAMUSCULAR; INTRAVENOUS
Status: DISCONTINUED | OUTPATIENT
Start: 2020-01-13 | End: 2020-01-13 | Stop reason: HOSPADM

## 2020-01-13 RX ORDER — HYDRALAZINE HYDROCHLORIDE 20 MG/ML
5 INJECTION INTRAMUSCULAR; INTRAVENOUS
Status: DISCONTINUED | OUTPATIENT
Start: 2020-01-13 | End: 2020-01-13 | Stop reason: HOSPADM

## 2020-01-13 RX ORDER — HYDROCODONE BITARTRATE AND ACETAMINOPHEN 7.5; 325 MG/1; MG/1
1 TABLET ORAL ONCE AS NEEDED
Status: COMPLETED | OUTPATIENT
Start: 2020-01-13 | End: 2020-01-13

## 2020-01-13 RX ORDER — CEFAZOLIN SODIUM 2 G/100ML
2 INJECTION, SOLUTION INTRAVENOUS ONCE
Status: COMPLETED | OUTPATIENT
Start: 2020-01-13 | End: 2020-01-13

## 2020-01-13 RX ORDER — MIDAZOLAM HYDROCHLORIDE 1 MG/ML
1 INJECTION INTRAMUSCULAR; INTRAVENOUS
Status: DISCONTINUED | OUTPATIENT
Start: 2020-01-13 | End: 2020-01-13 | Stop reason: HOSPADM

## 2020-01-13 RX ORDER — FENTANYL CITRATE 50 UG/ML
100 INJECTION, SOLUTION INTRAMUSCULAR; INTRAVENOUS
Status: DISCONTINUED | OUTPATIENT
Start: 2020-01-13 | End: 2020-01-13 | Stop reason: HOSPADM

## 2020-01-13 RX ORDER — SODIUM CHLORIDE 0.9 % (FLUSH) 0.9 %
3 SYRINGE (ML) INJECTION EVERY 12 HOURS SCHEDULED
Status: DISCONTINUED | OUTPATIENT
Start: 2020-01-13 | End: 2020-01-13 | Stop reason: HOSPADM

## 2020-01-13 RX ORDER — FENTANYL CITRATE 50 UG/ML
50 INJECTION, SOLUTION INTRAMUSCULAR; INTRAVENOUS
Status: DISCONTINUED | OUTPATIENT
Start: 2020-01-13 | End: 2020-01-13 | Stop reason: HOSPADM

## 2020-01-13 RX ORDER — PROMETHAZINE HYDROCHLORIDE 25 MG/1
25 TABLET ORAL ONCE AS NEEDED
Status: DISCONTINUED | OUTPATIENT
Start: 2020-01-13 | End: 2020-01-13 | Stop reason: HOSPADM

## 2020-01-13 RX ORDER — LIDOCAINE HYDROCHLORIDE 10 MG/ML
0.5 INJECTION, SOLUTION EPIDURAL; INFILTRATION; INTRACAUDAL; PERINEURAL ONCE AS NEEDED
Status: DISCONTINUED | OUTPATIENT
Start: 2020-01-13 | End: 2020-01-13 | Stop reason: HOSPADM

## 2020-01-13 RX ADMIN — LIDOCAINE HYDROCHLORIDE 60 MG: 20 INJECTION, SOLUTION INFILTRATION; PERINEURAL at 10:19

## 2020-01-13 RX ADMIN — FENTANYL CITRATE 50 MCG: 50 INJECTION INTRAMUSCULAR; INTRAVENOUS at 10:26

## 2020-01-13 RX ADMIN — SODIUM CHLORIDE, POTASSIUM CHLORIDE, SODIUM LACTATE AND CALCIUM CHLORIDE 9 ML/HR: 600; 310; 30; 20 INJECTION, SOLUTION INTRAVENOUS at 11:17

## 2020-01-13 RX ADMIN — FENTANYL CITRATE 50 MCG: 50 INJECTION, SOLUTION INTRAMUSCULAR; INTRAVENOUS at 11:47

## 2020-01-13 RX ADMIN — FENTANYL CITRATE 50 MCG: 50 INJECTION, SOLUTION INTRAMUSCULAR; INTRAVENOUS at 11:36

## 2020-01-13 RX ADMIN — SODIUM CHLORIDE, POTASSIUM CHLORIDE, SODIUM LACTATE AND CALCIUM CHLORIDE 9 ML/HR: 600; 310; 30; 20 INJECTION, SOLUTION INTRAVENOUS at 09:44

## 2020-01-13 RX ADMIN — DEXAMETHASONE SODIUM PHOSPHATE 6 MG: 10 INJECTION INTRAMUSCULAR; INTRAVENOUS at 10:53

## 2020-01-13 RX ADMIN — HYDROCODONE BITARTRATE AND ACETAMINOPHEN 1 TABLET: 7.5; 325 TABLET ORAL at 11:37

## 2020-01-13 RX ADMIN — HYDROMORPHONE HYDROCHLORIDE 0.5 MG: 1 INJECTION, SOLUTION INTRAMUSCULAR; INTRAVENOUS; SUBCUTANEOUS at 12:02

## 2020-01-13 RX ADMIN — OXYCODONE HYDROCHLORIDE AND ACETAMINOPHEN 1 TABLET: 7.5; 325 TABLET ORAL at 13:04

## 2020-01-13 RX ADMIN — PROPOFOL 200 MG: 10 INJECTION, EMULSION INTRAVENOUS at 10:19

## 2020-01-13 RX ADMIN — ONDANSETRON HYDROCHLORIDE 4 MG: 2 SOLUTION INTRAMUSCULAR; INTRAVENOUS at 10:53

## 2020-01-13 RX ADMIN — HYDRALAZINE HYDROCHLORIDE 10 MG: 20 INJECTION INTRAMUSCULAR; INTRAVENOUS at 11:20

## 2020-01-13 RX ADMIN — CEFAZOLIN SODIUM 2 G: 2 INJECTION, SOLUTION INTRAVENOUS at 10:18

## 2020-01-13 RX ADMIN — KETOROLAC TROMETHAMINE 15 MG: 30 INJECTION, SOLUTION INTRAMUSCULAR; INTRAVENOUS at 10:55

## 2020-01-13 RX ADMIN — FENTANYL CITRATE 50 MCG: 50 INJECTION INTRAMUSCULAR; INTRAVENOUS at 10:43

## 2020-01-13 RX ADMIN — HYDRALAZINE HYDROCHLORIDE 10 MG: 20 INJECTION INTRAMUSCULAR; INTRAVENOUS at 11:34

## 2020-01-13 RX ADMIN — FAMOTIDINE 20 MG: 10 INJECTION INTRAVENOUS at 09:59

## 2020-01-13 NOTE — PERIOPERATIVE NURSING NOTE
Dr Leigh notified of current blood pressure 167/80, patient has remained in sinus ryhthm since admission to [facility].  Okay to send to phase 2, no need for cardiac folllow up.  Pain tolerable

## 2020-01-13 NOTE — PERIOPERATIVE NURSING NOTE
Dr asencio notified of patient's blood pressures since admission to [facility] after 20mg hydralazine iv.  Last bp 187/91.  Patient heartrate with brief tachycardia after raising head of the bed.  Remains in sinus rhythm.  heartrate in the 70s. Wait on additional blood pressure meds.

## 2020-01-13 NOTE — OP NOTE
Operative Note      Facility: [facility]  Patient Name: Jed Correia  YOB: 1945  Date: 1/13/2020  Medical Record Number: [id number]      Pre-op Diagnosis:   Medial meniscus tear right knee  Post-Op Diagnosis Codes:  Medial meniscus tear right knee, anterior horn lateral meniscus tear, grade 3 changes on the patella grade 2 changes medial compartment  Procedure(s):  Right KNEE ARTHROSCOPY, PARTIAL MEDIAL AND LATERAL MENISECTOMY, AND DEBRIDEMENT OF ARTHRITIS chondroplasty of the patellofemoral joint    Surgeon(s):  Mercedes Falk MD    Anesthesia: General  Anesthesiologist: Jed Leigh MD  CRNA: Suzy Shell CRNA    Staff:   Circulator: Melony Herzog RN  Scrub Person: Guera Ty    Assistants : none      Estimated Blood Loss: 5 cc    Specimens:    none     Drains: None    Findings: See Dictation    Complications: None      Indication for procedure: This patient has had a several month history of knee pain and has an exam and an MRI which are consistent with meniscal pathology. They understand all options and wished to proceed with arthroscopy.      Description of procedure: The patient was taken to the operating room. They were placed supine on the operating room table. After induction of adequate LMA anesthesia, IV antibiotics the underwent exam under anesthesia was symmetric full range of motion. Nonsterile tourniquet was applied patient was placed in the thigh brennan all prominent areas well padded and into the table dropped. The leg was prepped and draped in usual sterile fashion. Standard lateral incision was made with 11 blade. Blunt trocar penetrated into the joint scope followed in the evaluation began the patella appeared centrally within the trochlear groove he had marked synovitis throughout the knee and a large effusion upon entering the joint.  He did have marked degenerative changes patella felt to be grade 3 similarly on the trochlear groove.  I  then entered the medial compartment under spinal needle localization direct visualization a medial portal is established he had a complex tear of the medial meniscus involving a large horizontal cleavage component this was resected with various angled biters baskets motorized heather and ultimately the Apollo device.  He had grade 2 changes on the femur and the tibia.  Evaluate the notch the ACL PCL were intact.  I then entered the lateral compartment he had an anterior horn tear lateral meniscus tear which was debrided with a motorized shaver and the Apollo device the remainder was normal.  I then turned my attention patellofemoral joint gently debrided both sides of the joint             At this point everything was thoroughly irrigated it was suctioned all 3 compartments, the gutters the suprapatellar pouch were all evaluated there was no further acute pathology seen.  Everything was thoroughly irrigated it was injected with Marcaine Depo-Medrol.  The portals were closed with 3-0 nylon interrupted fashion.  Sterile dressings and Ace wraps were applied.  The patient tolerated the procedure well and was taken to recovery room in good condition.  All sponge and needle count were correct                    Date: 1/13/2020  Time: 11:31 AM

## 2020-01-13 NOTE — ANESTHESIA POSTPROCEDURE EVALUATION
Patient: Jed Correia    Procedure Summary     Date:  01/13/20 Room / Location:   [facility]    Anesthesia Start:  1017 Anesthesia Stop:  1117    Procedure:  KNEE ARTHROSCOPY, PARTIAL MEDIAL AND LATERAL MENISECTOMY, AND DEBRIDEMENT OF ARTHRITIS (Right Knee) Diagnosis:       Other tear of medial meniscus of right knee as current injury, subsequent encounter      (Other tear of medial meniscus of right knee as current injury, subsequent encounter [U90.270Z])    Surgeon:  Mercedes Falk MD Provider:  Jed Leigh MD    Anesthesia Type:  general ASA Status:  3          Anesthesia Type: general    Vitals  Vitals Value Taken Time   /75 1/13/2020 12:15 PM   Temp 36.7 °C (98 °F) 1/13/2020 12:15 PM   Pulse 75 1/13/2020 12:19 PM   Resp 16 1/13/2020 12:15 PM   SpO2 96 % 1/13/2020 12:20 PM   Vitals shown include unvalidated device data.        Post Anesthesia Care and Evaluation    Patient location during evaluation: bedside  Patient participation: complete - patient participated  Level of consciousness: awake  Pain score: 1  Pain management: adequate  Airway patency: patent  Anesthetic complications: No anesthetic complications  PONV Status: controlled  Cardiovascular status: acceptable  Respiratory status: acceptable  Hydration status: acceptable    Comments: -------------------------              01/13/20                    1215        -------------------------   BP:         162/75        Pulse:        72          Resp:         16          Temp:   36.7 °C (98 °F)   SpO2:         97%        -------------------------

## 2020-01-13 NOTE — PERIOPERATIVE NURSING NOTE
Pt with history of a fib as recent as in or during surgery, presents to [facility] in sinus rhythm, hr, 58.  Elevated blood pressures of 217/102 with patient asleep, 10mg hydralazine given iv.

## 2020-01-13 NOTE — ANESTHESIA PROCEDURE NOTES
Airway  Urgency: elective    Date/Time: 1/13/2020 10:22 AM  Airway not difficult    General Information and Staff    Patient location during procedure: OR  Anesthesiologist: Jed Leigh MD  CRNA: Suzy Shell CRNA    Indications and Patient Condition  Indications for airway management: airway protection    Preoxygenated: yes  MILS not maintained throughout  Mask difficulty assessment: 1 - vent by mask    Final Airway Details  Final airway type: supraglottic airway      Successful airway: classic  Size 4    Number of attempts at approach: 1    Additional Comments  Preoxygenation FEO2 >85, SIVI, LMA placed with ease, teeth/lips as preop. Secured and placement confirmed.

## 2020-01-13 NOTE — H&P
History & Physical       Patient: Jed Correia    Date of Admission: No admission date for patient encounter.    YOB: 1945    Medical Record Number: [id number]    Attending Physician: Mercedes Falk MD        Chief Complaints: Other tear of medial meniscus of right knee as current injury, subsequent encounter [J94.103N]      History of Present Illness: This patient is several month history of right knee pain following an injury.  He has an MRI which shows a meniscus tear and his exam is consistent with this.  We have tried to treat this conservatively as he does have some medical problems however his symptoms have remained they are activity limiting and he is actually afraid that he might fall with this plan is to proceed with knee arthroscopy we have obtained preop clearance from cardiology     Allergies:   Allergies   Allergen Reactions   • Iodine Rash   • Latex Rash       Medications:   Home Medications:  No current facility-administered medications on file prior to encounter.      Current Outpatient Medications on File Prior to Encounter   Medication Sig   • amLODIPine (NORVASC) 2.5 MG tablet Take 2.5 mg by mouth Daily.   • aspirin 81 MG chewable tablet Chew 81 mg Daily.   • Cholecalciferol (VITAMIN D-3) 1000 UNITS capsule Take 2,000 Units by mouth Daily.   • clopidogrel (PLAVIX) 75 MG tablet Take 75 mg by mouth Daily.   • Coenzyme Q10 (CO Q 10) 10 MG capsule Take 1 tablet by mouth Daily.   • ezetimibe (ZETIA) 10 MG tablet Take 10 mg by mouth Daily.   • folic acid (FOLVITE) 1 MG tablet Take 1 mg by mouth Daily.   • glucosamine-chondroitin 500-400 MG capsule capsule Take 2 capsules by mouth Daily.   • Multiple Vitamins-Minerals (CENTRUM SILVER ADULT 50+ PO) Take 1 tablet by mouth Daily.   • nitroglycerin (NITROSTAT) 0.4 MG SL tablet Place 0.4 mg under the tongue Every 5 (Five) Minutes As Needed.   • Omega-3 Fatty Acids (FISH OIL) 1000 MG capsule capsule Take 1,000 mg by mouth Daily With  Breakfast.   • pantoprazole (PROTONIX) 40 MG EC tablet Take 40 mg by mouth Daily.   • PROAIR RESPICLICK 108 (90 BASE) MCG/ACT inhaler 1-2 inhalations every 6-8 hours as needed   • Probiotic Product (PROBIOTIC DAILY PO) Take 1 capsule by mouth Daily.   • ramipril (ALTACE) 10 MG capsule Take 10 mg by mouth Daily.   • venlafaxine (EFFEXOR) 75 MG tablet Take 75 mg by mouth Daily.   • vitamin B-12 (CYANOCOBALAMIN) 100 MCG tablet Take 50 mcg by mouth Daily.   • vitamin B-6 (PYRIDOXINE) 50 MG tablet Take 50 mg by mouth Daily.   • atorvastatin (LIPITOR) 10 MG tablet Take 10 mg by mouth Daily.     Current Medications:  Scheduled Meds:  Continuous Infusions:  No current facility-administered medications for this encounter.   PRN Meds:.    Past Medical History:   Diagnosis Date   • Arthritis    • Cancer (CMS/HCC)     COLON, PROSTATE, SKIN   • Hyperlipidemia    • Hypertension    • Positional sleep apnea 2019    Home sleep study.  AHI normal at 2/h for total monitoring time.  When supine, mildly abnormal at 6.4 events per hour.  No sleep-related hypoxia.        Past Surgical History:   Procedure Laterality Date   • CARDIAC SURGERY      STENT   • COLON SURGERY      COLON RESECTION, COLON CANCER REMOVAL   • COLONOSCOPY N/A 2018    Procedure: COLONOSCOPY TO CECUM/TI WITH POLYPECTOMY ( COLD BX);  Surgeon: Moise Garcia MD;  Location: [facility] ENDOSCOPY;  Service: Gastroenterology   • EYE SURGERY      CATARACTS, MACULAR HOLE REPAIR   • PROSTATE SURGERY      CANCER   • SKIN BIOPSY      5X-BASAL CELL CARCINOMA        Social History     Occupational History   • Not on file   Tobacco Use   • Smoking status: Former Smoker     Packs/day: 0.50     Years: 15.00     Pack years: 7.50     Types: Cigarettes     Last attempt to quit:      Years since quittin.0   • Smokeless tobacco: Never Used   • Tobacco comment: QUIT 30 YEARS AGO   Substance and Sexual Activity   • Alcohol use: Yes     Comment: SOCIALLY   • Drug use: No    • Sexual activity: Defer    Social History     Social History Narrative   • Not on file        Family History   Problem Relation Age of Onset   • Malig Hyperthermia Neg Hx        Review of Systems      Physical Exam: 74 y.o. male  General Appearance:    Alert, cooperative, in no acute distress                    Vitals:    01/10/20 1515   Weight: 93.9 kg (207 lb)        Head:    Normocephalic, without obvious abnormality, atraumatic   Eyes:            conjunctivae and sclerae normal, no pallor, corneas clear,    Ears:    Ears appear intact with no abnormalities noted   Throat:   No oral lesions, no thrush, oral mucosa moist   Neck:   No adenopathy, supple, trachea midline, no thyromegaly,    Back:     No kyphosis present, no scoliosis present, no skin lesions,      erythema or scars, no tenderness to percussion or                   palpation,   range of motion normal   Lungs:     Clear to auscultation,respirations regular, even and                  unlabored    Heart:    Regular rhythm and normal rate               Chest Wall:    No abnormalities observed   Abdomen:     Normal bowel sounds, no masses, no organomegaly, soft        non-tender, non-distended, no guarding, no rebound                tenderness   Rectal:     Deferred   Extremities:    Moves all extremities well, no edema,   no cyanosis, no redness   Pulses:   Pulses palpable and equal bilaterally   Skin:   No bleeding, bruising or rash   Lymph nodes:   No palpable adenopathy   Neurologic:   Appears neurologic intact             Assessment:  Patient Active Problem List   Diagnosis   • Positional sleep apnea   • Hx of colonic polyps   • FH: colon cancer   • FH: colon polyps   • Hx of malignant neoplasm of colon   • Positional sleep apnea   • Tear of medial meniscus of right knee, current           Plan: All risks, benefits and alternatives were discussed.  Risks including to but not exclusive to anesthetic complications, including death, MI, CVA,  infection, bleeding DVT, PE,  fracture, residual pain and need for future surgery.  Patient understood all and agrees to proceed.

## 2020-01-13 NOTE — ANESTHESIA PREPROCEDURE EVALUATION
Anesthesia Evaluation     Patient summary reviewed and Nursing notes reviewed   NPO Solid Status: > 8 hours             Airway   Mallampati: II  TM distance: >3 FB  Neck ROM: full  no difficulty expected  Dental - normal exam     Pulmonary - normal exam   (+) sleep apnea,   Cardiovascular - normal exam    (+) hypertension, cardiac stents     ROS comment: No cv sx    Neuro/Psych- negative ROS  GI/Hepatic/Renal/Endo - negative ROS     Musculoskeletal (-) negative ROS    Abdominal  - normal exam   Substance History - negative use     OB/GYN negative ob/gyn ROS         Other                        Anesthesia Plan    ASA 3     general     intravenous induction     Anesthetic plan, all risks, benefits, and alternatives have been provided, discussed and informed consent has been obtained with: patient.    Plan discussed with CRNA.

## 2020-01-17 ENCOUNTER — TELEPHONE (OUTPATIENT)
Dept: ORTHOPEDIC SURGERY | Facility: CLINIC | Age: 75
End: 2020-01-17

## 2020-01-17 NOTE — TELEPHONE ENCOUNTER
Called pt and he stated he has taken the ace wrap off and the rash is still there and is not improving, he has applied cortisone cream. He's going to see if he can find someone to bring him in the office, would you be willing to check this out since Dileep is out of the office?

## 2020-01-17 NOTE — TELEPHONE ENCOUNTER
Please see what is going on.  It does not sound serious but let us make sure it nothing that is going to cause a problem over the weekend.  Can probably just leave the Ace wrap off and take Benadryl.

## 2020-01-17 NOTE — TELEPHONE ENCOUNTER
Call returned to the patient.  Patient called because he was concerned he still had a rash behind the knee.  It has spread to the lateral aspect of his knee.  His incisions are intact without any redness or drainage.  He has been applying cortisone cream which does seem to help with the itching.  Sounds like he has a contact dermatitis from the Ace bandage.  Patient does have a latex allergy.  He has left the Ace wrap off for the last 2 days.  Have been instructed him to continue with the cortisone cream and should also try taking some Benadryl by mouth to help with the itching.  Would recommend that he keep that area clean and dry.  If symptoms do not improve he will notify the office otherwise he will follow-up with KG at his regular scheduled appointment

## 2020-01-24 ENCOUNTER — OFFICE VISIT (OUTPATIENT)
Dept: ORTHOPEDIC SURGERY | Facility: CLINIC | Age: 75
End: 2020-01-24

## 2020-01-24 ENCOUNTER — TELEPHONE (OUTPATIENT)
Dept: ORTHOPEDIC SURGERY | Facility: CLINIC | Age: 75
End: 2020-01-24

## 2020-01-24 VITALS — HEIGHT: 69 IN | BODY MASS INDEX: 30.81 KG/M2 | WEIGHT: 208 LBS | TEMPERATURE: 98 F

## 2020-01-24 DIAGNOSIS — Z98.890 S/P RIGHT KNEE ARTHROSCOPY: Primary | ICD-10-CM

## 2020-01-24 DIAGNOSIS — Z98.890 S/P ARTHROSCOPY OF KNEE: Primary | ICD-10-CM

## 2020-01-24 PROCEDURE — 99024 POSTOP FOLLOW-UP VISIT: CPT | Performed by: ORTHOPAEDIC SURGERY

## 2020-01-24 NOTE — PROGRESS NOTES
Right Knee Scope follow Up 1st Visit      Patient: Jed Correia        YOB: 1945      Chief Complaints: Right knee pain      History of Present Illness: Pt is here f/u knee arthroscopy he states he doing great he has no pain at all happy with where he is        Allergies:   Allergies   Allergen Reactions   • Iodine Rash   • Latex Rash       Medications:   Home Medications:  Current Outpatient Medications on File Prior to Visit   Medication Sig   • amLODIPine (NORVASC) 2.5 MG tablet Take 2.5 mg by mouth Daily.   • aspirin 81 MG chewable tablet Chew 81 mg Daily.   • atorvastatin (LIPITOR) 10 MG tablet Take 10 mg by mouth Daily.   • Cholecalciferol (VITAMIN D-3) 1000 UNITS capsule Take 2,000 Units by mouth Daily.   • clopidogrel (PLAVIX) 75 MG tablet Take 75 mg by mouth Daily.   • Coenzyme Q10 (CO Q 10) 10 MG capsule Take 1 tablet by mouth Daily.   • ezetimibe (ZETIA) 10 MG tablet Take 10 mg by mouth Daily.   • folic acid (FOLVITE) 1 MG tablet Take 1 mg by mouth Daily.   • glucosamine-chondroitin 500-400 MG capsule capsule Take 2 capsules by mouth Daily.   • HYDROcodone-acetaminophen (NORCO) 5-325 MG per tablet Take 1 tablet by mouth every 4 (Four) hours as needed for severe pain.   • Multiple Vitamins-Minerals (CENTRUM SILVER ADULT 50+ PO) Take 1 tablet by mouth Daily.   • nitroglycerin (NITROSTAT) 0.4 MG SL tablet Place 0.4 mg under the tongue Every 5 (Five) Minutes As Needed.   • Omega-3 Fatty Acids (FISH OIL) 1000 MG capsule capsule Take 1,000 mg by mouth Daily With Breakfast.   • pantoprazole (PROTONIX) 40 MG EC tablet Take 40 mg by mouth Daily.   • PROAIR RESPICLICK 108 (90 BASE) MCG/ACT inhaler 1-2 inhalations every 6-8 hours as needed   • Probiotic Product (PROBIOTIC DAILY PO) Take 1 capsule by mouth Daily.   • ramipril (ALTACE) 10 MG capsule Take 10 mg by mouth Daily.   • venlafaxine (EFFEXOR) 75 MG tablet Take 75 mg by mouth Daily.   • vitamin B-12 (CYANOCOBALAMIN) 100 MCG tablet Take 50 mcg by  mouth Daily.   • vitamin B-6 (PYRIDOXINE) 50 MG tablet Take 50 mg by mouth Daily.     No current facility-administered medications on file prior to visit.      Current Medications:  Scheduled Meds:  Continuous Infusions:  No current facility-administered medications for this visit.   PRN Meds:.          Physical Exam: 74 y.o. male  General Appearance:    Alert, cooperative, in no acute distress                 There were no vitals filed for this visit.   Patient is alert and oriented ×3 no acute distress normal mood physical exam.  Physical exam of the knee, incisions looked good there is no erythema, calf is soft and non-tender.  No sign or sx of DVT      Assessment  S/P knee scope.  I did review intraoperative findings and arthroscopic pictures with the patient.          Plan: To remove sutures today place Steri-Strips and start into  physical therapy and I will have thrm follow up in 4 weeks.  If he is doing well he can call and cancel

## 2020-01-28 ENCOUNTER — TREATMENT (OUTPATIENT)
Dept: PHYSICAL THERAPY | Facility: CLINIC | Age: 75
End: 2020-01-28

## 2020-01-28 ENCOUNTER — TELEPHONE (OUTPATIENT)
Dept: ORTHOPEDIC SURGERY | Facility: CLINIC | Age: 75
End: 2020-01-28

## 2020-01-28 DIAGNOSIS — R29.898 WEAKNESS OF RIGHT LOWER EXTREMITY: ICD-10-CM

## 2020-01-28 DIAGNOSIS — G89.29 CHRONIC PAIN OF LEFT KNEE: ICD-10-CM

## 2020-01-28 DIAGNOSIS — Z98.890 S/P RIGHT KNEE ARTHROSCOPY: Primary | ICD-10-CM

## 2020-01-28 DIAGNOSIS — G89.29 CHRONIC PAIN OF LEFT KNEE: Primary | ICD-10-CM

## 2020-01-28 DIAGNOSIS — M25.562 CHRONIC PAIN OF LEFT KNEE: ICD-10-CM

## 2020-01-28 DIAGNOSIS — M25.561 ACUTE PAIN OF RIGHT KNEE: ICD-10-CM

## 2020-01-28 DIAGNOSIS — M25.562 CHRONIC PAIN OF LEFT KNEE: Primary | ICD-10-CM

## 2020-01-28 PROCEDURE — 97110 THERAPEUTIC EXERCISES: CPT | Performed by: PHYSICAL THERAPIST

## 2020-01-28 PROCEDURE — 97161 PT EVAL LOW COMPLEX 20 MIN: CPT | Performed by: PHYSICAL THERAPIST

## 2020-01-28 NOTE — TELEPHONE ENCOUNTER
Pt called said you were going to add left leg pain to his physical therapy order but they told him it was not on there and he is asking if you could put in order for that so they can help him with that/dm

## 2020-01-28 NOTE — PROGRESS NOTES
Physical Therapy Initial Evaluation and Plan of Care    Patient: Jed Correia   : 1945  Diagnosis/ICD-10 Code:  S/P right knee arthroscopy [Z98.890]  Referring practitioner: Mercedes Falk MD  Date of Initial Visit: 2020  Today's Date: 2020    Subjective Evaluation    History of Present Illness  Date of surgery: 2020  Mechanism of injury: S/p right knee arthroscopy on 2020. Also with left knee pain that exacerbated when his right knee started hurting. Pt has an airdyne bike (usually 10 miles), also walks 2.5 miles. Hasn't been doing since November, would like to get back to it. Currently ambulating with straight cane.    PMH: left knee scope 2-3 years ago    Quality of life: good    Pain  Current pain ratin  At best pain ratin  Pain scale at highest: 1/10 right, 3/10 left.  Location: B knees  Quality: dull ache and sharp  Relieving factors: ice and rest (took pain medicine for 2-3 days after surgery, no longer taking)  Aggravating factors: ambulation, movement and standing  Progression: improved    Social Support  Lives in: one-story house (with basement)  Lives with: spouse    Diagnostic Tests  MRI studies: abnormal (right knee prior to surgery)    Patient Goals  Patient goals for therapy: decreased pain and increased strength (walk for upcoming trip to [place])             Objective       Observations     Right Knee   Negative for atrophy and deformity.     Additional Observation Details  Incisions healing well without signs of infection    Active Range of Motion   Left Knee   Flexion: 127 degrees   Extension: 0 degrees     Right Knee   Flexion: 122 degrees   Extension: 0 degrees     Patellar Mobility   Left Knee Patellar tendons within functional limits include the medial, lateral, superior and inferior.     Right Knee Patellar tendons within functional limits include the medial, lateral, superior and inferior.     Patellar Static Positioning   Left Knee: WFL  Right Knee:  WFL    Strength/Myotome Testing     Left Hip   Planes of Motion   Flexion: 4+  Extension: 4-  Abduction: 4-    Right Hip   Planes of Motion   Flexion: 4  Extension: 4-  Abduction: 4-    Left Knee   Flexion: 4  Extension: 4  Quadriceps contraction: good    Right Knee   Flexion: 4  Extension: 4  Quadriceps contraction: good    Tests     Left Hip   Andres: Positive.   90/90 SLR: Positive.     Right Hip   Andres: Positive.   90/90 SLR: Positive.     Ambulation     Observational Gait   Gait: antalgic   Increased left stance time and right swing time. Decreased walking speed, stride length, right stance time and left swing time.          Assessment & Plan     Assessment  Impairments: abnormal muscle tone, abnormal or restricted ROM, impaired physical strength, lacks appropriate home exercise program and pain with function  Assessment details: Mr. Correia is a pleasant 74 year old male who presents with pain, limited ROM/flexibility, decreased strength and limited activity tolerance consistent with post op status. He will benefit from PT to address impairments so that he can return to normal exercise program.  Prognosis: good  Functional Limitations: walking, uncomfortable because of pain and sitting  Goals  Plan Goals: Short Term Goals: 2-4 weeks. Patient will:  1. Be independent with initial HEP  2. Be instructed in posture and body mechanics  3. Ambulate with normal symmetrical gait pattern without need for assistive device.    Long Term Goals: 4-6 weeks. Patient will:  1. Demonstrate improved Bilateral lower extremity MMT of >/= 4+/5  2. Demonstrate lower extremity flexibility WFL.  3. Report ability to walk 2 miles for exercise with </= 1/10 pain.  4. LEFS score 60/80 or better.      Plan  Therapy options: will be seen for skilled physical therapy services  Planned modality interventions: cryotherapy, TENS, electrical stimulation/Russian stimulation, thermotherapy (hydrocollator packs) and ultrasound  Planned therapy  interventions: abdominal trunk stabilization, manual therapy, neuromuscular re-education, postural training, soft tissue mobilization, spinal/joint mobilization, joint mobilization, stretching, strengthening, functional ROM exercises and flexibility  Frequency: 2x week  Duration in weeks: 8  Treatment plan discussed with: patient        Manual Therapy:    0     mins  60343;  Therapeutic Exercise:    24     mins  18895;     Neuromuscular Luis:    0    mins  43114;    Therapeutic Activity:     0     mins  40107;     Gait Trainin     mins  82058;     Ultrasound:     0     mins  48067;    Electrical Stimulation:    0     mins  05350 ( );    Timed Treatment:   24   mins   Total Treatment:     60   mins    PT SIGNATURE: Michelle Gonzalez PT, DPT          Physical Therapist                               [place] License #[id number]    DATE TREATMENT INITIATED: 2020    Initial Certification  Certification Period: 2020  I certify that the therapy services are furnished while this patient is under my care.  The services outlined above are required by this patient, and will be reviewed every 90 days.     PHYSICIAN: Mercedes Falk MD      DATE:     Please sign and return via fax to [phone].. Thank you, [facility].

## 2020-01-31 NOTE — PATIENT INSTRUCTIONS
Pt was educated regarding relevant anatomy/physiology and plan of care.      Access Code: [email or web address]   URL: [email or web address]   Date: 01/28/2020   Prepared by: Michelle Gonzalez     Exercises   Supine Quadricep Sets - 10 reps - 2 sets - 5 Hold - 1x daily   Supine Active Straight Leg Raise - 10 reps - 2 sets - 1x daily   Supine Hamstring Stretch with Strap - 3 reps - 20 hold - 1x daily   Supine Heel Slide with Strap - 10 reps - 1 sets - 10 hold - 1x daily

## 2020-02-03 ENCOUNTER — TREATMENT (OUTPATIENT)
Dept: PHYSICAL THERAPY | Facility: CLINIC | Age: 75
End: 2020-02-03

## 2020-02-03 DIAGNOSIS — M25.561 ACUTE PAIN OF RIGHT KNEE: ICD-10-CM

## 2020-02-03 DIAGNOSIS — Z98.890 S/P RIGHT KNEE ARTHROSCOPY: Primary | ICD-10-CM

## 2020-02-03 DIAGNOSIS — R29.898 WEAKNESS OF RIGHT LOWER EXTREMITY: ICD-10-CM

## 2020-02-03 PROCEDURE — 97110 THERAPEUTIC EXERCISES: CPT | Performed by: PHYSICAL THERAPIST

## 2020-02-03 NOTE — PROGRESS NOTES
Physical Therapy Daily Progress Note  Visit: 2    Subjective Jed Correia reports: his knee pain is minimal. Reports compliance with HEP     Objective   See Exercise, Manual, and Modality Logs for complete treatment.     Assessment/Plan: Compliant/cooperative with current rehab efforts.  Plan details: Progress ROM / strengthening / stabilization / functional activity as tolerated       Manual Therapy:          mins  45777;  Therapeutic Exercise:      25    mins  08464;     Neuromuscular Luis:         mins  02509;    Therapeutic Activity:           mins  77082;     Gait Training:            mins  24465;     Ultrasound:           mins  59590;    Electrical Stimulation:          mins  92637 ( );  Dry Needling           mins self-pay  Traction           mins 72769  Canalith Repositioning         mins 36524      Timed Treatment:  25    mins   Total Treatment:      35  mins    Katarzyna Collins PT  [place] License #: [id number]    Physical Therapist

## 2020-02-06 ENCOUNTER — TREATMENT (OUTPATIENT)
Dept: PHYSICAL THERAPY | Facility: CLINIC | Age: 75
End: 2020-02-06

## 2020-02-06 DIAGNOSIS — M25.561 ACUTE PAIN OF RIGHT KNEE: ICD-10-CM

## 2020-02-06 DIAGNOSIS — R29.898 WEAKNESS OF RIGHT LOWER EXTREMITY: ICD-10-CM

## 2020-02-06 DIAGNOSIS — Z98.890 S/P RIGHT KNEE ARTHROSCOPY: Primary | ICD-10-CM

## 2020-02-06 PROCEDURE — 97116 GAIT TRAINING THERAPY: CPT | Performed by: PHYSICAL THERAPIST

## 2020-02-06 PROCEDURE — 97110 THERAPEUTIC EXERCISES: CPT | Performed by: PHYSICAL THERAPIST

## 2020-02-06 NOTE — PROGRESS NOTES
" Physical Therapy Daily Progress Note    Visit #3    Subjective     Jed Correia reports: \"feeling pretty good\"      Objective   See Exercise, Manual, and Modality Logs for complete treatment.       Assessment/Plan  Added gait activities in parallel bars, tolerated well.   Progress per Plan of Care           Manual Therapy:    0     mins  45973;  Therapeutic Exercise:    30     mins  42333;     Neuromuscular Luis:    0    mins  26875;    Therapeutic Activity:     0     mins  78364;     Gait Trainin     mins  36149;     Ultrasound:     0     mins  78437;    Electrical Stimulation:    0     mins  61601 ( );    Timed Treatment:   38   mins   Total Treatment:     50   mins    Michelle Gonzalez PT, DPT  Physical Therapist  [place] License #[id number]                    "

## 2020-02-11 ENCOUNTER — TREATMENT (OUTPATIENT)
Dept: PHYSICAL THERAPY | Facility: CLINIC | Age: 75
End: 2020-02-11

## 2020-02-11 DIAGNOSIS — G89.29 CHRONIC PAIN OF LEFT KNEE: ICD-10-CM

## 2020-02-11 DIAGNOSIS — R29.898 WEAKNESS OF RIGHT LOWER EXTREMITY: ICD-10-CM

## 2020-02-11 DIAGNOSIS — M25.561 ACUTE PAIN OF RIGHT KNEE: ICD-10-CM

## 2020-02-11 DIAGNOSIS — Z98.890 S/P RIGHT KNEE ARTHROSCOPY: Primary | ICD-10-CM

## 2020-02-11 DIAGNOSIS — M25.562 CHRONIC PAIN OF LEFT KNEE: ICD-10-CM

## 2020-02-11 PROCEDURE — 97110 THERAPEUTIC EXERCISES: CPT | Performed by: PHYSICAL THERAPIST

## 2020-02-11 NOTE — PROGRESS NOTES
Physical Therapy Daily Progress Note    Visit #4    Subjective     Jed Correia reports: right knee is feeling good, outside of left knee is sore today.      Objective   See Exercise, Manual, and Modality Logs for complete treatment.       Assessment/Plan  TFL/ITB tightness, tenderness at distal attachment. Added TFL/ITB stretch in clinic and to HEP.  Progress per Plan of Care           Manual Therapy:    0     mins  13577;  Therapeutic Exercise:    20     mins  03213;  direct   Neuromuscular Luis:    0    mins  19122;    Therapeutic Activity:     0     mins  20878;     Gait Trainin     mins  92879;     Ultrasound:     0     mins  25447;    Electrical Stimulation:    0     mins  79551 ( );    Timed Treatment:   20   mins   Total Treatment:     50   mins    Michelle Gonzalez PT, DPT  Physical Therapist  [place] License #[id number]

## 2020-02-13 ENCOUNTER — TREATMENT (OUTPATIENT)
Dept: PHYSICAL THERAPY | Facility: CLINIC | Age: 75
End: 2020-02-13

## 2020-02-13 DIAGNOSIS — R29.898 WEAKNESS OF RIGHT LOWER EXTREMITY: ICD-10-CM

## 2020-02-13 DIAGNOSIS — Z98.890 S/P RIGHT KNEE ARTHROSCOPY: Primary | ICD-10-CM

## 2020-02-13 DIAGNOSIS — M25.561 ACUTE PAIN OF RIGHT KNEE: ICD-10-CM

## 2020-02-13 PROCEDURE — 97110 THERAPEUTIC EXERCISES: CPT | Performed by: PHYSICAL THERAPIST

## 2020-02-13 NOTE — PROGRESS NOTES
Physical Therapy Daily Progress Note    Visit #5    Subjective     Jed Correia reports: knees feel pretty good today.      Objective   See Exercise, Manual, and Modality Logs for complete treatment.       Assessment/Plan  Quad strength and endurance continue to improve.  Progress per Plan of Care           Manual Therapy:    0     mins  04567;  Therapeutic Exercise:    35     mins  11649; direct    Neuromuscular Luis:    0    mins  86995;    Therapeutic Activity:     0     mins  75740;     Gait Trainin     mins  50944;     Ultrasound:     0     mins  21585;    Electrical Stimulation:    0     mins  03374 ( );    Timed Treatment:   35   mins   Total Treatment:     60   mins    Michelle Gonzalez PT, DPT  Physical Therapist  [place] License #[id number]

## 2020-02-18 ENCOUNTER — TREATMENT (OUTPATIENT)
Dept: PHYSICAL THERAPY | Facility: CLINIC | Age: 75
End: 2020-02-18

## 2020-02-18 DIAGNOSIS — R29.898 WEAKNESS OF RIGHT LOWER EXTREMITY: ICD-10-CM

## 2020-02-18 DIAGNOSIS — M25.561 ACUTE PAIN OF RIGHT KNEE: ICD-10-CM

## 2020-02-18 DIAGNOSIS — G89.29 CHRONIC PAIN OF LEFT KNEE: ICD-10-CM

## 2020-02-18 DIAGNOSIS — Z98.890 S/P RIGHT KNEE ARTHROSCOPY: Primary | ICD-10-CM

## 2020-02-18 DIAGNOSIS — M25.562 CHRONIC PAIN OF LEFT KNEE: ICD-10-CM

## 2020-02-18 PROCEDURE — 97110 THERAPEUTIC EXERCISES: CPT | Performed by: PHYSICAL THERAPIST

## 2020-02-18 NOTE — PROGRESS NOTES
Physical Therapy Daily Progress Note    Visit #6    Subjective     Jed Correia reports: his right knee feels great, his left knee is painful this morning.      Objective   See Exercise, Manual, and Modality Logs for complete treatment.       Assessment/Plan  Increased standing exercise today, tolerated well without increased pain.  Progress per Plan of Care           Manual Therapy:    0     mins  85388;  Therapeutic Exercise:    35     mins  53683;  direct   Neuromuscular Luis:    0    mins  58345;    Therapeutic Activity:     0     mins  23021;     Gait Trainin     mins  35027;     Ultrasound:     0     mins  32192;    Electrical Stimulation:    0     mins  53455 ( );    Timed Treatment:   35   mins   Total Treatment:     55   mins    Michelle Gonzalez PT, DPT  Physical Therapist  [place] License #[id number]

## 2020-02-20 ENCOUNTER — TREATMENT (OUTPATIENT)
Dept: PHYSICAL THERAPY | Facility: CLINIC | Age: 75
End: 2020-02-20

## 2020-02-20 DIAGNOSIS — G89.29 CHRONIC PAIN OF LEFT KNEE: ICD-10-CM

## 2020-02-20 DIAGNOSIS — M25.562 CHRONIC PAIN OF LEFT KNEE: ICD-10-CM

## 2020-02-20 DIAGNOSIS — Z98.890 S/P RIGHT KNEE ARTHROSCOPY: Primary | ICD-10-CM

## 2020-02-20 DIAGNOSIS — M25.561 ACUTE PAIN OF RIGHT KNEE: ICD-10-CM

## 2020-02-20 DIAGNOSIS — R29.898 WEAKNESS OF RIGHT LOWER EXTREMITY: ICD-10-CM

## 2020-02-20 PROCEDURE — 97110 THERAPEUTIC EXERCISES: CPT | Performed by: PHYSICAL THERAPIST

## 2020-02-20 NOTE — PROGRESS NOTES
Physical Therapy Daily Progress Note    Visit #7    Subjective     Jed Correia reports: left knee pain is improving, ITB stretch seems to be helping.      Objective   See Exercise, Manual, and Modality Logs for complete treatment.       Assessment/Plan  Step length and speed with gait improving, less antalgic.  Progress per Plan of Care           Manual Therapy:    0     mins  68674;  Therapeutic Exercise:    40     mins  08694;     Neuromuscular Luis:    0    mins  34778;    Therapeutic Activity:     0     mins  34078;     Gait Trainin     mins  78026;     Ultrasound:     0     mins  16644;    Electrical Stimulation:    0     mins  30750 ( );    Timed Treatment:   40   mins   Total Treatment:     50   mins    Michelle Gonzalez PT, DPT  Physical Therapist  [place] License #[id number]

## 2020-02-25 ENCOUNTER — TREATMENT (OUTPATIENT)
Dept: PHYSICAL THERAPY | Facility: CLINIC | Age: 75
End: 2020-02-25

## 2020-02-25 DIAGNOSIS — R29.898 WEAKNESS OF RIGHT LOWER EXTREMITY: ICD-10-CM

## 2020-02-25 DIAGNOSIS — M25.561 ACUTE PAIN OF RIGHT KNEE: ICD-10-CM

## 2020-02-25 DIAGNOSIS — G89.29 CHRONIC PAIN OF LEFT KNEE: ICD-10-CM

## 2020-02-25 DIAGNOSIS — M25.562 CHRONIC PAIN OF LEFT KNEE: ICD-10-CM

## 2020-02-25 DIAGNOSIS — Z98.890 S/P RIGHT KNEE ARTHROSCOPY: Primary | ICD-10-CM

## 2020-02-25 PROCEDURE — 97110 THERAPEUTIC EXERCISES: CPT | Performed by: PHYSICAL THERAPIST

## 2020-02-25 NOTE — PROGRESS NOTES
Physical Therapy Daily Progress Note    Visit #8    Subjective     Jed Correia reports: pain on the outside of his left knee is much better, is having some anterior knee pain this morning.      Objective   See Exercise, Manual, and Modality Logs for complete treatment.       Assessment/Plan  Increased weights with several exercises, tolerated well without pain. Gait pattern is steadily improving.   Progress per Plan of Care, reassess next visit           Manual Therapy:    0     mins  60287;  Therapeutic Exercise:    40     mins  49865;   Neuromuscular Luis:    0    mins  75607;    Therapeutic Activity:     0     mins  26393;     Gait Trainin     mins  79455;     Ultrasound:     0     mins  37438;    Electrical Stimulation:    0     mins  52650 ( );    Timed Treatment:   40   mins   Total Treatment:     50   mins    Michelle Gonzalez PT, DPT  Physical Therapist  [place] License #[id number]

## 2020-02-27 ENCOUNTER — OFFICE VISIT (OUTPATIENT)
Dept: ORTHOPEDIC SURGERY | Facility: CLINIC | Age: 75
End: 2020-02-27

## 2020-02-27 ENCOUNTER — TREATMENT (OUTPATIENT)
Dept: PHYSICAL THERAPY | Facility: CLINIC | Age: 75
End: 2020-02-27

## 2020-02-27 VITALS — HEIGHT: 69 IN | TEMPERATURE: 98.1 F | WEIGHT: 203.1 LBS | BODY MASS INDEX: 30.08 KG/M2

## 2020-02-27 DIAGNOSIS — R29.898 WEAKNESS OF RIGHT LOWER EXTREMITY: ICD-10-CM

## 2020-02-27 DIAGNOSIS — M25.562 CHRONIC PAIN OF LEFT KNEE: ICD-10-CM

## 2020-02-27 DIAGNOSIS — Z98.890 S/P RIGHT KNEE ARTHROSCOPY: Primary | ICD-10-CM

## 2020-02-27 DIAGNOSIS — M25.561 ACUTE PAIN OF RIGHT KNEE: ICD-10-CM

## 2020-02-27 DIAGNOSIS — S83.242A TEAR OF MEDIAL MENISCUS OF LEFT KNEE, CURRENT, UNSPECIFIED TEAR TYPE, INITIAL ENCOUNTER: ICD-10-CM

## 2020-02-27 DIAGNOSIS — M25.562 LEFT KNEE PAIN, UNSPECIFIED CHRONICITY: Primary | ICD-10-CM

## 2020-02-27 DIAGNOSIS — G89.29 CHRONIC PAIN OF LEFT KNEE: ICD-10-CM

## 2020-02-27 PROCEDURE — 20610 DRAIN/INJ JOINT/BURSA W/O US: CPT | Performed by: ORTHOPAEDIC SURGERY

## 2020-02-27 PROCEDURE — 97110 THERAPEUTIC EXERCISES: CPT | Performed by: PHYSICAL THERAPIST

## 2020-02-27 PROCEDURE — 99213 OFFICE O/P EST LOW 20 MIN: CPT | Performed by: ORTHOPAEDIC SURGERY

## 2020-02-27 RX ORDER — METHYLPREDNISOLONE ACETATE 80 MG/ML
80 INJECTION, SUSPENSION INTRA-ARTICULAR; INTRALESIONAL; INTRAMUSCULAR; SOFT TISSUE
Status: COMPLETED | OUTPATIENT
Start: 2020-02-27 | End: 2020-02-27

## 2020-02-27 RX ADMIN — METHYLPREDNISOLONE ACETATE 80 MG: 80 INJECTION, SUSPENSION INTRA-ARTICULAR; INTRALESIONAL; INTRAMUSCULAR; SOFT TISSUE at 13:14

## 2020-02-27 NOTE — PROGRESS NOTES
Knee Scope follow Up       Patient: Jed Correia        YOB: 1945      Chief Complaints: right  knee pain      History of Present Illness: Pt is here f/u knee arthroscopy        Allergies:   Allergies   Allergen Reactions   • Iodine Rash   • Latex Rash       Medications:   Home Medications:  Current Outpatient Medications on File Prior to Visit   Medication Sig   • amLODIPine (NORVASC) 2.5 MG tablet Take 2.5 mg by mouth Daily.   • aspirin 81 MG chewable tablet Chew 81 mg Daily.   • atorvastatin (LIPITOR) 10 MG tablet Take 10 mg by mouth Daily.   • Cholecalciferol (VITAMIN D-3) 1000 UNITS capsule Take 2,000 Units by mouth Daily.   • clopidogrel (PLAVIX) 75 MG tablet Take 75 mg by mouth Daily.   • Coenzyme Q10 (CO Q 10) 10 MG capsule Take 1 tablet by mouth Daily.   • ezetimibe (ZETIA) 10 MG tablet Take 10 mg by mouth Daily.   • folic acid (FOLVITE) 1 MG tablet Take 1 mg by mouth Daily.   • glucosamine-chondroitin 500-400 MG capsule capsule Take 2 capsules by mouth Daily.   • HYDROcodone-acetaminophen (NORCO) 5-325 MG per tablet Take 1 tablet by mouth every 4 (Four) hours as needed for severe pain.   • Multiple Vitamins-Minerals (CENTRUM SILVER ADULT 50+ PO) Take 1 tablet by mouth Daily.   • nitroglycerin (NITROSTAT) 0.4 MG SL tablet Place 0.4 mg under the tongue Every 5 (Five) Minutes As Needed.   • Omega-3 Fatty Acids (FISH OIL) 1000 MG capsule capsule Take 1,000 mg by mouth Daily With Breakfast.   • pantoprazole (PROTONIX) 40 MG EC tablet Take 40 mg by mouth Daily.   • PROAIR RESPICLICK 108 (90 BASE) MCG/ACT inhaler 1-2 inhalations every 6-8 hours as needed   • Probiotic Product (PROBIOTIC DAILY PO) Take 1 capsule by mouth Daily.   • ramipril (ALTACE) 10 MG capsule Take 10 mg by mouth Daily.   • venlafaxine (EFFEXOR) 75 MG tablet Take 75 mg by mouth Daily.   • vitamin B-12 (CYANOCOBALAMIN) 100 MCG tablet Take 50 mcg by mouth Daily.   • vitamin B-6 (PYRIDOXINE) 50 MG tablet Take 50 mg by mouth Daily.      No current facility-administered medications on file prior to visit.      Current Medications:  Scheduled Meds:  Continuous Infusions:  No current facility-administered medications for this visit.   PRN Meds:.          Physical Exam: 74 y.o. male  General Appearance:    Alert, cooperative, in no acute distress                 There were no vitals filed for this visit.   Patient is alert and oriented ×3 no acute distress normal mood physical exam.  Physical exam of the knee, incisions looked good there is no erythema, calf is soft and non-tender.  No sign or sx of DVT      Assessment  S/P knee scope.  Overall doing well.         Plan: Continue with strengthening, progression of activities    Large Joint Arthrocentesis: L knee  Date/Time: 2/27/2020 11:29 AM  Consent given by: patient  Site marked: site marked  Timeout: Immediately prior to procedure a time out was called to verify the correct patient, procedure, equipment, support staff and site/side marked as required   Supporting Documentation  Indications: pain and joint swelling   Procedure Details  Location: knee - L knee  Preparation: Patient was prepped and draped in the usual sterile fashion  Needle size: 22 G  Approach: anterolateral  Medications administered: 80 mg methylPREDNISolone acetate 80 MG/ML; 4 mL lidocaine (cardiac)  Patient tolerance: patient tolerated the procedure well with no immediate complications

## 2020-02-27 NOTE — PROGRESS NOTES
Re-Assessment / Re-Certification    Patient: Jed Correia   : 1945  Diagnosis/ICD-10 Code:  S/P right knee arthroscopy [Z98.890]  Referring practitioner: Mercedes Falk MD  Date of Initial Visit: 2020  Today's Date: 2020  Patient seen for 9 sessions      Subjective:   Jed Correia reports: his right knee feels great, left knee is still intermittently painful. Feels he has gotten a lot stronger. Has been walking 1/2 mile at a time.  Subjective Questionnaire: LEFS: 55/80 (improved from 50/80 at initial evaluation)  Clinical Progress: improved  Home Program Compliance: Yes  Treatment has included: therapeutic exercise, therapeutic activity and cryotherapy    Subjective   Objective       Active Range of Motion   Left Knee   Flexion: 128 degrees   Extension: 0 degrees     Right Knee   Flexion: 128 degrees   Extension: 0 degrees     Strength/Myotome Testing     Left Hip   Planes of Motion   Flexion: 4+  Extension: 4+  Abduction: 4+    Right Hip   Planes of Motion   Flexion: 4+  Extension: 4+  Abduction: 4+    Left Knee   Flexion: 4+  Extension: 4+    Right Knee   Flexion: 5  Extension: 5     Assessment/Plan  Progress toward previous goals: Partially Met    Short Term Goals: 2-4 weeks. Patient will:  1. Be independent with initial HEP (MET)  2. Be instructed in posture and body mechanics (MET)  3. Ambulate with normal symmetrical gait pattern without need for assistive device. (MET)    Long Term Goals: 4-6 weeks. Patient will:  1. Demonstrate improved Bilateral lower extremity MMT of >/= 4+/5 (MET)  2. Demonstrate lower extremity flexibility WFL. (MET)  3. Report ability to walk 2 miles for exercise with </= 1/10 pain. (PROGRESSING)  4. LEFS score 60/80 or better. (PROGRESSING)      Recommendations: Pt has met most goals, anticipate will meet remaining goals with HEP. Recommend follow up in 2-3 weeks to assess, then D/C.  Timeframe: 1 month  Prognosis to achieve goals: good    PT Signature: Michelle Gonzalez  PT, DPT                         Physical Therapist                         [place] License #[id number]    Based upon review of the patient's progress and continued therapy plan, it is my medical opinion that Jed Correia should continue physical therapy treatment at [facility]  [address] [place] [id number] [phone].    Signature: __________________________________  Mercedes Falk MD    Manual Therapy:    0     mins  65603;  Therapeutic Exercise:    20     mins  55842;   direct  Neuromuscular Luis:    0    mins  91187;    Therapeutic Activity:     0     mins  01616;     Gait Trainin     mins  11564;     Ultrasound:     0     mins  75321;    Electrical Stimulation:    0     mins  21001 ( );    Timed Treatment:   20   mins   Total Treatment:     50   mins

## 2020-02-27 NOTE — PROGRESS NOTES
New Knee      Patient: Jed Correia        YOB: 1945    Medical Record Number: [id number]        Chief Complaints: New left knee pain, right knee arthroscopy follow-up      History of Present Illness: This is a 74-year-old who is here really follow-up of right knee arthroscopy he states his knee feels great feels like nothing was ever even done to it states now his left knee is bothering him that is been ongoing for several weeks no history injury change in activity other than the increased weight following his surgery on the right pain is primarily medial does not feel exactly the same as the right one.  Sounds are intermittent moderate aching worse with activity somewhat better with rest his past medical history is remarkable for hyperlipidemia hypertension sleep apnea cancer and arthritis         Allergies:   Allergies   Allergen Reactions   • Iodine Rash   • Latex Rash       Medications:   Home Medications:  Current Outpatient Medications on File Prior to Visit   Medication Sig   • amLODIPine (NORVASC) 2.5 MG tablet Take 2.5 mg by mouth Daily.   • aspirin 81 MG chewable tablet Chew 81 mg Daily.   • atorvastatin (LIPITOR) 10 MG tablet Take 10 mg by mouth Daily.   • Cholecalciferol (VITAMIN D-3) 1000 UNITS capsule Take 2,000 Units by mouth Daily.   • clopidogrel (PLAVIX) 75 MG tablet Take 75 mg by mouth Daily.   • Coenzyme Q10 (CO Q 10) 10 MG capsule Take 1 tablet by mouth Daily.   • ezetimibe (ZETIA) 10 MG tablet Take 10 mg by mouth Daily.   • folic acid (FOLVITE) 1 MG tablet Take 1 mg by mouth Daily.   • glucosamine-chondroitin 500-400 MG capsule capsule Take 2 capsules by mouth Daily.   • HYDROcodone-acetaminophen (NORCO) 5-325 MG per tablet Take 1 tablet by mouth every 4 (Four) hours as needed for severe pain.   • Multiple Vitamins-Minerals (CENTRUM SILVER ADULT 50+ PO) Take 1 tablet by mouth Daily.   • nitroglycerin (NITROSTAT) 0.4 MG SL tablet Place 0.4 mg under the tongue Every 5 (Five)  Minutes As Needed.   • Omega-3 Fatty Acids (FISH OIL) 1000 MG capsule capsule Take 1,000 mg by mouth Daily With Breakfast.   • pantoprazole (PROTONIX) 40 MG EC tablet Take 40 mg by mouth Daily.   • PROAIR RESPICLICK 108 (90 BASE) MCG/ACT inhaler 1-2 inhalations every 6-8 hours as needed   • Probiotic Product (PROBIOTIC DAILY PO) Take 1 capsule by mouth Daily.   • ramipril (ALTACE) 10 MG capsule Take 10 mg by mouth Daily.   • venlafaxine (EFFEXOR) 75 MG tablet Take 75 mg by mouth Daily.   • vitamin B-12 (CYANOCOBALAMIN) 100 MCG tablet Take 50 mcg by mouth Daily.   • vitamin B-6 (PYRIDOXINE) 50 MG tablet Take 50 mg by mouth Daily.     No current facility-administered medications on file prior to visit.      Current Medications:  Scheduled Meds:  Continuous Infusions:  No current facility-administered medications for this visit.   PRN Meds:.    Past Medical History:   Diagnosis Date   • Arthritis    • Cancer (CMS/HCC)     COLON, PROSTATE, SKIN   • Hyperlipidemia    • Hypertension    • Positional sleep apnea 09/18/2019    Home sleep study.  AHI normal at 2/h for total monitoring time.  When supine, mildly abnormal at 6.4 events per hour.  No sleep-related hypoxia.        Past Surgical History:   Procedure Laterality Date   • CARDIAC SURGERY      STENT   • COLON SURGERY      COLON RESECTION, COLON CANCER REMOVAL   • COLONOSCOPY N/A 8/17/2018    Procedure: COLONOSCOPY TO CECUM/TI WITH POLYPECTOMY ( COLD BX);  Surgeon: Moise Garcia MD;  Location: [facility] ENDOSCOPY;  Service: Gastroenterology   • EYE SURGERY      CATARACTS, MACULAR HOLE REPAIR   • KNEE ARTHROSCOPY Right 1/13/2020    Procedure: KNEE ARTHROSCOPY, PARTIAL MEDIAL AND LATERAL MENISECTOMY, AND DEBRIDEMENT OF ARTHRITIS;  Surgeon: Mercedes Falk MD;  Location: [facility] OR [facility] [place];  Service: Orthopedics   • PROSTATE SURGERY      CANCER   • SKIN BIOPSY      5X-BASAL CELL CARCINOMA        Social History     Occupational History   • Not on file   Tobacco Use   •  "Smoking status: Former Smoker     Packs/day: 0.50     Years: 15.00     Pack years: 7.50     Types: Cigarettes     Last attempt to quit:      Years since quittin.1   • Smokeless tobacco: Never Used   • Tobacco comment: QUIT 30 YEARS AGO   Substance and Sexual Activity   • Alcohol use: Yes     Comment: SOCIALLY   • Drug use: No   • Sexual activity: Defer    Social History     Social History Narrative   • Not on file        Family History   Problem Relation Age of Onset   • Malig Hyperthermia Neg Hx              Review of Systems: 14 point review of systems are remarkable for left knee pain only the remainder negative per the patient    Review of Systems      Physical Exam: 74 y.o. male  General Appearance:    Alert, cooperative, in no acute distress                 Vitals:    20 1059   Temp: 98.1 °F (36.7 °C)   TempSrc: Temporal   Weight: 92.1 kg (203 lb 1.6 oz)   Height: 175.3 cm (69\")      Patient is alert and read ×3 no acute distress appears her above-listed at height weight and age.  Affect is normal respiratory rate is normal unlabored. Heart rate regular rate rhythm, sclera, dentition and hearing are normal for the purpose of this exam.        Ortho Exam Physical exam of the left knee reveals no effusion no redness.  The patient does have tenderness about the medial joint line.  No tenderness about the lateral joint line.  A negative bounce home and a positive medial Taye.  There is some pain medially  with a lateral Taye.  Patient has a stable ligamentous exam.  Quads are reasonable and symmetric bilaterally.  Calf is soft and nontender.  There is no overlying skin changes no lymphedema lymphadenopathy.  Patient has good hip range of motion full symmetric and asymptomatic and a normal ankle exam.  Right knee looks great he has full range of motion quads are good calf is soft nontender           I did review x-rays taken in December of last year AP lateral merchant view he has good " maintenance of his joint space on that left knee may be some very mild patellofemoral OA but no acute pathology I did not repeat these x-rays today  Assessment/Plan:    Status post right knee arthroscopy that knee is doing great new left pain in the left knee this could be meniscal in origin could be some mild degenerative changes plan is to proceed with an injection as a diagnostic and therapeutic tool he fails to improve we will get an MRI      Large Joint Arthrocentesis: L knee  Date/Time: 2/27/2020 1:14 PM  Consent given by: patient  Site marked: site marked  Timeout: Immediately prior to procedure a time out was called to verify the correct patient, procedure, equipment, support staff and site/side marked as required   Supporting Documentation  Indications: pain   Procedure Details  Location: knee - L knee  Preparation: Patient was prepped and draped in the usual sterile fashion  Needle size: 22 G  Approach: anteromedial  Medications administered: 80 mg methylPREDNISolone acetate 80 MG/ML; 4 mL lidocaine (cardiac)  Patient tolerance: patient tolerated the procedure well with no immediate complications

## 2020-06-24 ENCOUNTER — HOSPITAL ENCOUNTER (EMERGENCY)
Facility: HOSPITAL | Age: 75
Discharge: HOME | End: 2020-06-24
Attending: EMERGENCY MEDICINE | Admitting: EMERGENCY MEDICINE

## 2020-06-24 ENCOUNTER — APPOINTMENT (OUTPATIENT)
Dept: CT IMAGING | Facility: HOSPITAL | Age: 75
End: 2020-06-24

## 2020-06-24 VITALS
BODY MASS INDEX: 29.92 KG/M2 | OXYGEN SATURATION: 97 % | RESPIRATION RATE: 16 BRPM | SYSTOLIC BLOOD PRESSURE: 154 MMHG | DIASTOLIC BLOOD PRESSURE: 74 MMHG | WEIGHT: 202 LBS | TEMPERATURE: 96.1 F | HEART RATE: 60 BPM | HEIGHT: 69 IN

## 2020-06-24 DIAGNOSIS — S00.83XA TRAUMATIC HEMATOMA OF FOREHEAD, INITIAL ENCOUNTER: Primary | ICD-10-CM

## 2020-06-24 PROCEDURE — 70450 CT HEAD/BRAIN W/O DYE: CPT

## 2020-06-24 PROCEDURE — 72125 CT NECK SPINE W/O DYE: CPT

## 2020-06-24 PROCEDURE — 99282 EMERGENCY DEPT VISIT SF MDM: CPT

## 2020-06-24 NOTE — ED PROVIDER NOTES
" EMERGENCY DEPARTMENT ENCOUNTER    Room Number:  26/26  Date of encounter:  6/24/2020  PCP: Myke Landaverde MD  Historian: Patient      HPI:  Chief Complaint: Fall with head injury  A complete HPI/ROS/PMH/PSH/SH/FH are unobtainable due to: Nothing    Context: Jed Correia is a 75 y.o. male who presents to the ED c/o a mechanical fall earlier today in which he fell forward and hit his face.  Patient has a mild global headache which does not radiate, he has no nausea or vomiting, he had no loss of consciousness, and he has just some mild generalized neck pain.  There is no radiating numbness or tingling, and he does say his vision just feels \"a little blurry\".  He does take Plavix      PAST MEDICAL HISTORY  Active Ambulatory Problems     Diagnosis Date Noted   • Positional sleep apnea 07/23/2017   • Hx of colonic polyps 07/12/2018   • FH: colon cancer 07/12/2018   • FH: colon polyps 07/12/2018   • Hx of malignant neoplasm of colon 07/12/2018   • Positional sleep apnea 09/18/2019   • Tear of medial meniscus of right knee, current 12/20/2019     Resolved Ambulatory Problems     Diagnosis Date Noted   • No Resolved Ambulatory Problems     Past Medical History:   Diagnosis Date   • Arthritis    • Cancer (CMS/HCC)    • Hyperlipidemia    • Hypertension          PAST SURGICAL HISTORY  Past Surgical History:   Procedure Laterality Date   • CARDIAC SURGERY      STENT   • COLON SURGERY      COLON RESECTION, COLON CANCER REMOVAL   • COLONOSCOPY N/A 8/17/2018    Procedure: COLONOSCOPY TO CECUM/TI WITH POLYPECTOMY ( COLD BX);  Surgeon: Moise Garcia MD;  Location: [facility] ENDOSCOPY;  Service: Gastroenterology   • EYE SURGERY      CATARACTS, MACULAR HOLE REPAIR   • KNEE ARTHROSCOPY Right 1/13/2020    Procedure: KNEE ARTHROSCOPY, PARTIAL MEDIAL AND LATERAL MENISECTOMY, AND DEBRIDEMENT OF ARTHRITIS;  Surgeon: Mercedes Falk MD;  [place] [facility] [place] [facility] [place];  Service: Orthopedics   • PROSTATE SURGERY      CANCER   • SKIN " BIOPSY      5X-BASAL CELL CARCINOMA         FAMILY HISTORY  Family History   Problem Relation Age of Onset   • Malig Hyperthermia Neg Hx          SOCIAL HISTORY  Social History     Socioeconomic History   • Marital status:      Spouse name: Not on file   • Number of children: Not on file   • Years of education: Not on file   • Highest education level: Not on file   Tobacco Use   • Smoking status: Former Smoker     Packs/day: 0.50     Years: 15.00     Pack years: 7.50     Types: Cigarettes     Last attempt to quit:      Years since quittin.5   • Smokeless tobacco: Never Used   • Tobacco comment: QUIT 35 YEARS AGO   Substance and Sexual Activity   • Alcohol use: Yes     Comment: occasionnally   • Drug use: No   • Sexual activity: Defer         ALLERGIES  Iodine and Latex        REVIEW OF SYSTEMS  Review of Systems     All systems reviewed and negative except for those discussed in HPI.       PHYSICAL EXAM    I have reviewed the triage vital signs and nursing notes.    ED Triage Vitals   Temp Heart Rate Resp BP SpO2   20 1359 20 1359 20 1359 20 1536 20 1359   96.1 °F (35.6 °C) 68 16 179/74 98 %      Temp src Heart Rate Source Patient Position BP Location FiO2 (%)   20 1359 20 1536 20 1536 -- --   Tympanic Monitor Sitting         Physical Exam  GENERAL: Awake and alert, not distressed  HENT: nares patent.  There is a hematoma on the right upper forehead.  There is also an abrasion in that area and some soft tissue tenderness.  There are 2 areas of healing ulcer in the mid line of the forehead, which he reports are from recent dermatologic procedure.  There is some mild diffuse paraspinous tenderness in the neck, but no midline tenderness or step-off and no limitation in range of motion  EYES: no scleral icterus, Diane, EOMI, vision grossly intact and visual fields normal  CV: regular rhythm, regular rate  RESPIRATORY: normal effort  ABDOMEN:  soft  MUSCULOSKELETAL: no deformity  NEURO: alert, moves all extremities, follows commands, cranial nerves II through XII grossly intact and no focal neuro deficit  SKIN: warm, dry        LAB RESULTS  No results found for this or any previous visit (from the past 24 hour(s)).    Ordered the above labs and independently reviewed the results.        RADIOLOGY  Ct Head Without Contrast    Result Date: 6/24/2020  EMERGENCY NONCONTRAST HEAD CT NONCONTRAST CERVICAL SPINE CT 06/24/2020  CLINICAL HISTORY: Patient fell, head trauma, contusion to the right side of forehead, has blurred vision, headache and neck pain.  HEAD CT TECHNIQUE: Spiral CT images were obtained from the base of the skull to the vertex without intravenous contrast. Images were reformatted and submitted in 3 mm thick axial CT section with brain algorithm and 2 mm thick axial CT section with high-resolution bone algorithm, and 2 mm thick sagittal and coronal reconstructions were performed and submitted in brain algorithm.  COMPARISON: There are no prior studies from [facility] for comparison.  FINDINGS: There is mild low-density in the periventricular white consistent with mild small vessel disease. The remainder of the brain parenchyma is normal in attenuation. The ventricles are normal in size. I see no mass effect and no midline shift and no extra-axial fluid collections are identified. There is no evidence of acute intracranial hemorrhage. No acute skull fracture seen. The calvarium and skull base are normal in appearance. Paranasal sinuses and mastoid air cells and middle ear cavities are clear. There are calcified plaques in the intracranial segment of the distal vertebral arteries and cavernous segments of the internal carotid arteries bilaterally.      1. There is mild small vessel disease in the cerebral white matter, calcified plaques in the intracranial segment distal vertebral arteries and cavernous segment internal carotid  arteries bilaterally. 2. The remainder of the head CT is normal with no acute skull fracture or intracranial hemorrhage identified.  CERVICAL SPINE CT TECHNIQUE: Spiral CT images were obtained from the skull base down to the T2-T3 thoracic level and images were reformatted and submitted in 2 mm thick axial sagittal CT sections with soft tissue algorithm, 1 mm thick axial sagittal and coronal CT section with high-resolution bone algorithm.  FINDINGS: There are some arthritic changes at the atlantodental interval. Otherwise, the C1-C2 level is normal in appearance. The atlantooccipital articulation is normal in appearance.  At C2-C3, the disc space, facets and uncovertebral joints are normal with no canal or foraminal narrowing.  At C3-C4, there is mild bilateral facet overgrowth. There is moderate disc space narrowing. There are degenerative endplate changes, 1 mm retrolisthesis of C3 with respect to C4 and mild diffuse posterior disc osteophyte complex. There is mild canal narrowing. There is mild uncovertebral joint hypertrophy. There is mild bilateral bony foraminal narrowing.  At C4-C5, there is mild right facet overgrowth, minimal posterior central disc bulge, uncovertebral joints are normal. There is no canal or foraminal narrowing.  At C5-C6, there is minimal posterior disc bulge. Facets and uncovertebral joints are within normal limits. There is no canal or foraminal narrowing.  At C6-C7, there is mild disc space narrowing and degenerative endplate changes. There is minimal posterior spurring and uncovertebral joint hypertrophy. There is no canal or foraminal narrowing.  At C7-T1, images are very grainy ,limits evaluation. Posterior disc margin, disc heights well maintained, facets are normal. There is no bony canal or foraminal narrowing.  No acute fracture is seen in the cervical spine.  IMPRESSION: No acute fracture is seen in the cervical spine. There is mild cervical spondylosis as described in great  detail above.  Radiation dose reduction techniques were utilized, including automated exposure control and exposure modulation based on body size.  This report was finalized on 6/24/2020 4:48 PM by Dr. Moncho Garcia M.D.      Ct Cervical Spine Without Contrast    Result Date: 6/24/2020  EMERGENCY NONCONTRAST HEAD CT NONCONTRAST CERVICAL SPINE CT 06/24/2020  CLINICAL HISTORY: Patient fell, head trauma, contusion to the right side of forehead, has blurred vision, headache and neck pain.  HEAD CT TECHNIQUE: Spiral CT images were obtained from the base of the skull to the vertex without intravenous contrast. Images were reformatted and submitted in 3 mm thick axial CT section with brain algorithm and 2 mm thick axial CT section with high-resolution bone algorithm, and 2 mm thick sagittal and coronal reconstructions were performed and submitted in brain algorithm.  COMPARISON: There are no prior studies from [facility] for comparison.  FINDINGS: There is mild low-density in the periventricular white consistent with mild small vessel disease. The remainder of the brain parenchyma is normal in attenuation. The ventricles are normal in size. I see no mass effect and no midline shift and no extra-axial fluid collections are identified. There is no evidence of acute intracranial hemorrhage. No acute skull fracture seen. The calvarium and skull base are normal in appearance. Paranasal sinuses and mastoid air cells and middle ear cavities are clear. There are calcified plaques in the intracranial segment of the distal vertebral arteries and cavernous segments of the internal carotid arteries bilaterally.      1. There is mild small vessel disease in the cerebral white matter, calcified plaques in the intracranial segment distal vertebral arteries and cavernous segment internal carotid arteries bilaterally. 2. The remainder of the head CT is normal with no acute skull fracture or intracranial hemorrhage identified.   CERVICAL SPINE CT TECHNIQUE: Spiral CT images were obtained from the skull base down to the T2-T3 thoracic level and images were reformatted and submitted in 2 mm thick axial sagittal CT sections with soft tissue algorithm, 1 mm thick axial sagittal and coronal CT section with high-resolution bone algorithm.  FINDINGS: There are some arthritic changes at the atlantodental interval. Otherwise, the C1-C2 level is normal in appearance. The atlantooccipital articulation is normal in appearance.  At C2-C3, the disc space, facets and uncovertebral joints are normal with no canal or foraminal narrowing.  At C3-C4, there is mild bilateral facet overgrowth. There is moderate disc space narrowing. There are degenerative endplate changes, 1 mm retrolisthesis of C3 with respect to C4 and mild diffuse posterior disc osteophyte complex. There is mild canal narrowing. There is mild uncovertebral joint hypertrophy. There is mild bilateral bony foraminal narrowing.  At C4-C5, there is mild right facet overgrowth, minimal posterior central disc bulge, uncovertebral joints are normal. There is no canal or foraminal narrowing.  At C5-C6, there is minimal posterior disc bulge. Facets and uncovertebral joints are within normal limits. There is no canal or foraminal narrowing.  At C6-C7, there is mild disc space narrowing and degenerative endplate changes. There is minimal posterior spurring and uncovertebral joint hypertrophy. There is no canal or foraminal narrowing.  At C7-T1, images are very grainy ,limits evaluation. Posterior disc margin, disc heights well maintained, facets are normal. There is no bony canal or foraminal narrowing.  No acute fracture is seen in the cervical spine.  IMPRESSION: No acute fracture is seen in the cervical spine. There is mild cervical spondylosis as described in great detail above.  Radiation dose reduction techniques were utilized, including automated exposure control and exposure modulation based on  body size.  This report was finalized on 6/24/2020 4:48 PM by Dr. Moncho Garcia M.D.        I ordered the above noted radiological studies. Reviewed by me and discussed with radiologist.  See dictation for official radiology interpretation.      PROCEDURES    Procedures      MEDICATIONS GIVEN IN ER    Medications - No data to display      PROGRESS, DATA ANALYSIS, CONSULTS, AND MEDICAL DECISION MAKING    All labs have been independently reviewed by me.  All radiology studies have been reviewed by me and discussed with radiologist dictating the report.   EKG's independently viewed and interpreted by me.  Discussion below represents my analysis of pertinent findings related to patient's condition, differential diagnosis, treatment plan and final disposition.        ED Course as of Jun 24 2146   Wed Jun 24, 2020   1540 Dr. Garcia, radiology called me to let me know the results of the CT scan.  We discussed the patient's imaging.  He states that the CT head and C-spine are acutely negative.    [TD]      ED Course User Index  [TD] Andres Messina II, MD             AS OF 21:46 VITALS:    BP - 154/74  HR - 60  TEMP - 96.1 °F (35.6 °C) (Tympanic)  O2 SATS - 97%        DIAGNOSIS  Final diagnoses:   Traumatic hematoma of forehead, initial encounter         DISPOSITION  Discharge           Madhav Seay MD  06/24/20 2143

## 2021-03-03 DIAGNOSIS — Z23 IMMUNIZATION DUE: ICD-10-CM

## 2021-03-22 ENCOUNTER — TELEPHONE (OUTPATIENT)
Dept: ORTHOPEDIC SURGERY | Facility: CLINIC | Age: 76
End: 2021-03-22

## 2021-03-22 ENCOUNTER — APPOINTMENT (OUTPATIENT)
Dept: GENERAL RADIOLOGY | Facility: HOSPITAL | Age: 76
End: 2021-03-22

## 2021-03-22 PROCEDURE — 73564 X-RAY EXAM KNEE 4 OR MORE: CPT | Performed by: FAMILY MEDICINE

## 2021-03-22 NOTE — TELEPHONE ENCOUNTER
Patient called and was seen at [facility] 3-22-21 for a PCL tear of the left knee. They want him to be seen by Dr. Falk within the next week. Can he be worked in? He says he can go to either office and would be willing to see a different doctor if necessary.Please advise.

## 2021-03-23 NOTE — TELEPHONE ENCOUNTER
I will not be able to see him this week and I am out next week you can check with Emmie or even Marla

## 2021-03-25 ENCOUNTER — OFFICE VISIT (OUTPATIENT)
Dept: ORTHOPEDIC SURGERY | Facility: CLINIC | Age: 76
End: 2021-03-25

## 2021-03-25 VITALS — BODY MASS INDEX: 28.44 KG/M2 | TEMPERATURE: 97.6 F | HEIGHT: 69 IN | WEIGHT: 192 LBS

## 2021-03-25 DIAGNOSIS — M25.462 EFFUSION OF LEFT KNEE: Primary | ICD-10-CM

## 2021-03-25 DIAGNOSIS — S83.412A SPRAIN OF MEDIAL COLLATERAL LIGAMENT OF LEFT KNEE, INITIAL ENCOUNTER: ICD-10-CM

## 2021-03-25 PROCEDURE — 99213 OFFICE O/P EST LOW 20 MIN: CPT | Performed by: NURSE PRACTITIONER

## 2021-03-25 RX ORDER — BUPROPION HYDROCHLORIDE 150 MG/1
150 TABLET, EXTENDED RELEASE ORAL
COMMUNITY
End: 2022-12-21

## 2021-03-25 NOTE — PATIENT INSTRUCTIONS
Medial Collateral Knee Ligament Sprain    The medial collateral ligament (MCL) is a tough band of tissue in the knee that connects the thigh bone to the shin bone. Your MCL prevents your knee from moving too far inward and helps to keep your knee stable. An MCL sprain is a stretch or tear in the MCL.  What are the causes?  This condition may be caused by:  · A hard, direct hit (trauma) to the inside of your knee. This is a common cause.  · Your knee falling inward when you run, change directions quickly (cut), jump, or pivot.  · Repeatedly overstretching the MCL.  What increases the risk?  The following factors make you more likely to develop this condition:  · Playing contact sports, such as wrestling or football.  · Participating in sports that involve sudden movements of cutting, twisting, or turning. These movements are common in hockey, skiing, and soccer.  · Having weak hip and core muscles.  What are the signs or symptoms?  Symptoms of this condition include:  · Feeling or hearing a popping at the time of injury.  · Pain on the inside of the knee.  · Swelling in the knee.  · Bruising around the knee.  · Tenderness when pressing the inside of the knee.  · Feeling unstable when you stand, like your knee will give way.  · Difficulty walking on uneven surfaces.  How is this diagnosed?  This condition may be diagnosed based on:  · Your medical history.  · A physical exam.  · Imaging tests, such as an X-ray, ultrasound, or MRI.  During your physical exam, your health care provider will check for pain, limited motion, and instability.  How is this treated?  Treatment for this condition depends on how severe the injury is. Treatment may include:  · Keeping weight off the knee until swelling and pain improve.  · Raising (elevating) the knee above the level of your heart. This helps to reduce swelling.  · Icing the knee. This helps to reduce swelling.  · Taking an NSAID, such as ibuprofen. This helps to reduce pain and  swelling.  · Using a knee brace, elastic sleeve, or crutches while the injury heals.  · Using a knee brace when participating in athletic activities.  · Doing rehab exercises (physical therapy).  · Surgery. This may be needed if:  ? Your MCL tore all the way through.  ? Your knee is unstable.  ? Your knee is not getting better with other treatments.  Follow these instructions at home:  If you have a brace or sleeve:  · Wear it as told by your health care provider. Remove it only as told by your health care provider.  · Loosen the brace or remove the sleeve if your toes tingle, become numb, or turn cold and blue.  · Keep the brace or sleeve clean.  · If the brace or sleeve is not waterproof:  ? Do not let it get wet.  ? Cover it with a watertight covering when you take a bath or shower.  Managing pain, stiffness, and swelling    · If directed, put ice on the inside area of your knee.  ? If you have a removable brace or sleeve, remove it as told by your health care provider.  ? Put ice in a plastic bag.  ? Place a towel between your skin and the bag.  ? Leave the ice on for 20 minutes, 2-3 times a day.  · Move your foot and toes often to reduce stiffness and swelling.  · Elevate the injured area above the level of your heart while you are sitting or lying down.  Activity  · Ask your health care provider when it is safe to drive if you have a brace or sleeve on your leg.  · Return to your normal activities as told by your health care provider. Ask your health care provider what activities are safe for you.  · Do exercises as told by your health care provider.  · Do not use the injured leg to support your body weight until your health care provider says that you can. Use crutches as told by your health care provider.  General instructions  · Take over-the-counter and prescription medicines only as told by your health care provider.  · Do not use any products that contain nicotine or tobacco, such as cigarettes,  e-cigarettes, and chewing tobacco. These can delay healing. If you need help quitting, ask your health care provider.  · Keep all follow-up visits as told by your health care provider. This is important.  How is this prevented?  · Warm up and stretch before being active.  · Cool down and stretch after being active.  · Give your body time to rest between periods of activity.  · Make sure to use equipment that fits you.  · Be safe and responsible while being active. This will help you avoid falls.  · Do at least 150 minutes of moderate-intensity exercise each week, such as brisk walking or water aerobics.  · Maintain physical fitness, including:  ? Strength.  ? Flexibility.  ? Cardiovascular fitness.  ? Endurance.  Contact a health care provider if:  · Your symptoms do not improve.  · Your symptoms get worse.  Summary  · An MCL sprain is a knee injury that is caused by stretching the MCL too far. The injury can involve a tear in the MCL.  · Treatment for this condition depends on how severe the injury is. It may include rest, wearing a brace, or surgery.  · Do not use the injured leg to support your body weight until your health care provider says that you can. Use crutches as told by your health care provider.  · Contact a health care provider if your symptoms do not get better or they get worse.  · Keep all follow-up visits as told by your health care provider. This is important.  This information is not intended to replace advice given to you by your health care provider. Make sure you discuss any questions you have with your health care provider.  Document Revised: 08/07/2019 Document Reviewed: 08/07/2019  [employer] Patient Education © 2021 [employer]  Combined Knee Ligament Sprain    A ligament is a tough band of tissue that connects one bone to another bone. There are four ligaments in your knee. Together, they provide stability for your knee joint.  A combined knee ligament sprain is an injury that happens  when more than one knee ligament is severely stretched or torn. This kind of injury is also called an injury to multiple structures of the knee.  What are the causes?  This condition may be caused by:  · A direct hit (trauma) to the knee.  · Overextending the knee.  · Twisting the knee.  What increases the risk?  You are more likely to develop this condition if you participate in certain sports, including:  · Contact sports, such as football, rugby, and lacrosse.  · Sports that take place on uneven ground, such as soccer and cross country.  · Sports that involve quick changes in position, such as basketball, dancing, gymnastics, and skiing.  You are also more likely to develop this condition if you:  · Have poor strength or flexibility.  · Are overweight.  · Have overly flexible joints (joint laxity).  · Have previously injured your knee or had surgery on your knee.  What are the signs or symptoms?  Common symptoms of this condition include:  · Swelling.  · Severe pain with movement.  · Pain when the injured area is touched.  · Instability.  · A popping sound that happens at the time of injury.  · Not being able to stand or use the injured knee to support (bear) one's body weight.  How is this diagnosed?  This condition is diagnosed with a physical exam. You may also have imaging tests, such as:  · X-ray.  · MRI.  How is this treated?  Treatment depends on how badly the ligaments are injured and may include:  · Ice applied to the affected area.  · Medicines for pain.  · Placing the knee in a brace or splint to prevent movement and support the joint.  · Physical therapy to help strengthen and stabilize the knee joint.  · Surgery to reconstruct a torn ligament. This may be done in severe cases.  Follow these instructions at home:  If you have a splint or brace:  · Wear the splint or brace as told by your health care provider. Remove it only as told by your health care provider.  · Loosen the splint or brace if your  toes tingle, become numb, or turn cold and blue.  · Keep the splint or brace clean.  · If the splint or brace is not waterproof:  ? Do not let it get wet.  ? Cover it with a watertight covering when you take a bath or shower.  Managing pain, stiffness, and swelling    · If directed, put ice on the injured area.  ? If you have a removable splint or brace, remove it as told by your health care provider.  ? Put ice in a plastic bag.  ? Place a towel between your skin and the bag.  ? Leave the ice on for 20 minutes, 2-3 times a day.  · Move your toes often to reduce stiffness and swelling.  · Raise (elevate) the injured area above the level of your heart while you are sitting or lying down.  Medicines  · Take over-the-counter and prescription medicines only as told by your health care provider.  · Ask your health care provider if the medicine prescribed to you:  ? Requires you to avoid driving or using heavy machinery.  ? Can cause constipation. You may need to take actions to prevent or treat constipation, such as:  § Drink enough fluid to keep your urine pale yellow.  § Take over-the-counter or prescription medicines.  § Eat foods that are high in fiber, such as beans, whole grains, and fresh fruits and vegetables.  § Limit foods that are high in fat and processed sugars, such as fried or sweet foods.  Activity  · Ask your health care provider when it is safe to drive if you have a splint or brace on your knee.  · Return to your normal activities as told by your health care provider. Ask your health care provider what activities are safe for you.  · Perform exercises daily as told by your health care provider or physical therapist.  · Do not use the injured limb to support your body weight until your health care provider says that you can. Use crutches as told by your health care provider.  General instructions  · Do not take baths, swim, or use a hot tub until your health care provider approves. Ask your health care  provider if you may take showers. You may only be allowed to take sponge baths.  · Do not use any products that contain nicotine or tobacco, such as cigarettes, e-cigarettes, and chewing tobacco. These can delay healing. If you need help quitting, ask your health care provider.  · Keep all follow-up visits as told by your health care provider. This is important.  Contact a health care provider if:  · Your symptoms do not improve.  · Your symptoms get worse.  · You develop tingling or numbness in the area of your injury.  Get help right away if:  · You develop severe numbness or tingling in your leg or foot.  · Your foot turns blue, white, or gray, and it feels cold.  Summary  · A combined knee ligament sprain is an injury that happens when more than one knee ligament is severely stretched or torn.  · Follow instructions for the care of your knee, including rest and activity, as told by your health care provider.  · Do not use the injured limb to support your body weight until your health care provider says that you can. Use crutches as told by your health care provider.  · Contact a health care provider if your symptoms do not improve or get worse.  · Keep all follow-up visits as told by your health care provider. This is important.  This information is not intended to replace advice given to you by your health care provider. Make sure you discuss any questions you have with your health care provider.  Document Revised: 04/08/2020 Document Reviewed: 08/07/2019  [employer] Patient Education © 2021 [employer]

## 2021-03-25 NOTE — PROGRESS NOTES
Patient Name: Jed Correia   YOB: 1945  Referring Primary Care Physician: Myke Landaverde MD  BMI: Body mass index is 28.35 kg/m².    Chief Complaint:    Chief Complaint   Patient presents with   • Left Knee - Pain        HPI: Fall on Sunday [place] and slid and he hyperextended his knee. Saw DANIEL last year for meniscus repair  Walks 2.5 miles a day and rides a bike 10 miles a day.  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  Patient has been walking with a walker and a cane and using a knee brace for comfort.  Jed Correia is a 75 y.o. male who presents today for evaluation of   Chief Complaint   Patient presents with   • Left Knee - Pain       Subjective   Medications:   Home Medications:  Current Outpatient Medications on File Prior to Visit   Medication Sig   • amLODIPine (NORVASC) 2.5 MG tablet Take 2.5 mg by mouth Daily.   • aspirin 81 MG chewable tablet Chew 81 mg Daily.   • atorvastatin (LIPITOR) 10 MG tablet Take 10 mg by mouth Daily.   • buPROPion SR (WELLBUTRIN SR) 150 MG 12 hr tablet Take 150 mg by mouth.   • Cholecalciferol (VITAMIN D-3) 1000 UNITS capsule Take 2,000 Units by mouth Daily.   • clopidogrel (PLAVIX) 75 MG tablet Take 75 mg by mouth Daily.   • Coenzyme Q10 (CO Q 10) 10 MG capsule Take 1 tablet by mouth Daily.   • ezetimibe (ZETIA) 10 MG tablet Take 10 mg by mouth Daily.   • folic acid (FOLVITE) 1 MG tablet Take 1 mg by mouth Daily.   • glucosamine-chondroitin 500-400 MG capsule capsule Take 2 capsules by mouth Daily.   • HYDROcodone-acetaminophen (NORCO) 5-325 MG per tablet Take 1 tablet by mouth every 4 (Four) hours as needed for severe pain.   • Multiple Vitamins-Minerals (CENTRUM SILVER ADULT 50+ PO) Take 1 tablet by mouth Daily.   • nitroglycerin (NITROSTAT) 0.4 MG SL tablet Place 0.4 mg under the tongue Every 5 (Five) Minutes As Needed.   • Omega-3 Fatty Acids (FISH OIL) 1000 MG capsule capsule Take  1,000 mg by mouth Daily With Breakfast.   • pantoprazole (PROTONIX) 40 MG EC tablet Take 40 mg by mouth Daily.   • PROAIR RESPICLICK 108 (90 BASE) MCG/ACT inhaler 1-2 inhalations every 6-8 hours as needed   • Probiotic Product (PROBIOTIC DAILY PO) Take 1 capsule by mouth Daily.   • ramipril (ALTACE) 10 MG capsule Take 10 mg by mouth Daily.   • venlafaxine (EFFEXOR) 75 MG tablet Take 75 mg by mouth Daily.   • vitamin B-12 (CYANOCOBALAMIN) 100 MCG tablet Take 50 mcg by mouth Daily.   • vitamin B-6 (PYRIDOXINE) 50 MG tablet Take 50 mg by mouth Daily.     No current facility-administered medications on file prior to visit.     Current Medications:  Scheduled Meds:  Continuous Infusions:No current facility-administered medications for this visit.    PRN Meds:.    I have reviewed the patient's medical history in detail and updated the computerized patient record.  Review and summarization of old records includes:    Past Medical History:   Diagnosis Date   • Arthritis    • Cancer (CMS/HCC)     COLON, PROSTATE, SKIN   • Hyperlipidemia    • Hypertension    • Positional sleep apnea 09/18/2019    Home sleep study.  AHI normal at 2/h for total monitoring time.  When supine, mildly abnormal at 6.4 events per hour.  No sleep-related hypoxia.        Past Surgical History:   Procedure Laterality Date   • CARDIAC SURGERY      STENT   • COLON SURGERY      COLON RESECTION, COLON CANCER REMOVAL   • COLONOSCOPY N/A 8/17/2018    Procedure: COLONOSCOPY TO CECUM/TI WITH POLYPECTOMY ( COLD BX);  Surgeon: Moise Garcia MD;  Location: [facility] ENDOSCOPY;  Service: Gastroenterology   • EYE SURGERY      CATARACTS, MACULAR HOLE REPAIR   • KNEE ARTHROSCOPY Right 1/13/2020    Procedure: KNEE ARTHROSCOPY, PARTIAL MEDIAL AND LATERAL MENISECTOMY, AND DEBRIDEMENT OF ARTHRITIS;  Surgeon: Mercedes Falk MD;  [place] [facility] [place] [facility] [place]  Service: [place]opedics   • PROSTATE SURGERY      CANCER   • SKIN BIOPSY      5X-BASAL CELL CARCINOMA        Social  History     Occupational History   • Not on file   Tobacco Use   • Smoking status: Former Smoker     Packs/day: 0.50     Years: 15.00     Pack years: 7.50     Types: Cigarettes     Quit date:      Years since quittin.2   • Smokeless tobacco: Never Used   • Tobacco comment: QUIT 35 YEARS AGO   Substance and Sexual Activity   • Alcohol use: Yes     Comment: occasionnally   • Drug use: No   • Sexual activity: Defer      Social History     Social History Narrative   • Not on file        Family History   Problem Relation Age of Onset   • Cancer Sister    • Cancer Brother    • Alcohol abuse Brother    • Hypertension Brother    • ALS Brother    • Malig Hyperthermia Neg Hx        ROS: 14 point review of systems was performed and all other systems were reviewed and are negative except for documented findings in HPI and today's encounter.     Allergies:   Allergies   Allergen Reactions   • Bacitracin-Polymyxin B Unknown - Low Severity   • Neomycin-Bacitracin Zn-Polymyx Unknown - Low Severity   • Iodine Rash   • Latex Rash     Constitutional:  Denies fever, shaking or chills   Eyes:  Denies change in visual acuity   HENT:  Denies nasal congestion or sore throat   Respiratory:  Denies cough or shortness of breath   Cardiovascular:  Denies chest pain or severe LE edema   GI:  Denies abdominal pain, nausea, vomiting, bloody stools or diarrhea   Musculoskeletal:  Numbness, tingling, pain, or loss of motor function only as noted above in history of present illness.  : Denies painful urination or hematuria  Integument:  Denies rash, lesion or ulceration   Neurologic:  Denies headache or focal weakness  Endocrine:  Denies lymphadenopathy  Psych:  Denies confusion or change in mental status   Hem:  Denies active bleeding    OBJECTIVE:  Physical Exam: 75 y.o. male  Wt Readings from Last 3 Encounters:   21 87.1 kg (192 lb)   20 91.6 kg (202 lb)   20 92.1 kg (203 lb 1.6 oz)     Ht Readings from Last 1  "Encounters:   03/25/21 175.3 cm (69\")     Body mass index is 28.35 kg/m².  Vitals:    03/25/21 1136   Temp: 97.6 °F (36.4 °C)     Vital signs reviewed.     General Appearance:    Alert, cooperative, in no acute distress                  Eyes: conjunctiva clear  ENT: external ears and nose atraumatic  CV: no peripheral edema  Resp: normal respiratory effort  Skin: no rashes or wounds; normal turgor  Psych: mood and affect appropriate  Lymph: no nodes appreciated  Neuro: gross sensation intact  Vascular:  Palpable peripheral pulse in noted extremity  Musculoskeletal Extremities: Skin is warm dry and intact with good pulses movement and sensation calf is soft and nontender, he has tenderness to the lateral aspect of his knee over the LCL with effusion ligamentous exam is stable Taye's is negative he can ambulate with out any difficulty and has no tibial plateau tenderness    Radiology:   XR KNEE 4+ VW LEFT-     Clinical: Injured with pain     FINDINGS: There is a moderate size joint effusion. No fracture or  dislocation is demonstrated. Joint widths preserved. Vascular arterial  calcifications within the soft tissues.     CONCLUSION: Joint effusion, no acute osseous abnormality.     This report was finalized on 3/22/2021 11:04 AM by Dr. Neo Lorenzo M.D.      Procedures   left knee physical therapy and brace /walker     Assessment:     ICD-10-CM ICD-9-CM   1. Effusion of left knee  M25.462 719.06   2. Sprain of medial collateral ligament of left knee, initial encounter  S83.412A 844.1        MDM/Plan:   The diagnosis(es), natural history, pathophysiology and treatment for diagnosis(es) were discussed. Opportunity given and questions answered.  Biomechanics of pertinent body areas discussed.  When appropriate, the use of ambulatory aids discussed.    The diagnosis(es), natural history, pathophysiology and treatment for diagnosis(es) were discussed. Opportunity given and questions answered.  Biomechanics of " pertinent body areas discussed.  When appropriate, the use of ambulatory aids discussed.  EXERCISES:  Advice on benefits of, and types of regular/moderate exercise pertaining to orthopedic diagnosis(es).  MEDICATIONS:  The risks, benefits, warnings,side effects and alternatives of medications discussed.  Inflammation/pain control; with cold, heat, elevation and/or liniments discussed as appropriate  PT referral.  HOME EXERCISE/PT program encouraged  MEDICAL RECORDS reviewed from other provider(s) for past and current medical history pertinent to this complaint.  Discussed calling to schedule MRI if not significantly better with current treatment.       3/25/2021    Much of this encounter note is an electronic transcription/translation of spoken language to printed text. The electronic translation of spoken language may permit erroneous, or at times, nonsensical words or phrases to be inadvertently transcribed; Although I have reviewed the note for such errors, some may still exist

## 2021-03-29 ENCOUNTER — TREATMENT (OUTPATIENT)
Dept: PHYSICAL THERAPY | Facility: CLINIC | Age: 76
End: 2021-03-29

## 2021-03-29 DIAGNOSIS — S86.912D KNEE STRAIN, LEFT, SUBSEQUENT ENCOUNTER: Primary | ICD-10-CM

## 2021-03-29 DIAGNOSIS — Z74.09 IMPAIRED FUNCTIONAL MOBILITY, BALANCE, GAIT, AND ENDURANCE: ICD-10-CM

## 2021-03-29 PROCEDURE — G0283 ELEC STIM OTHER THAN WOUND: HCPCS | Performed by: PHYSICAL THERAPIST

## 2021-03-29 PROCEDURE — 97161 PT EVAL LOW COMPLEX 20 MIN: CPT | Performed by: PHYSICAL THERAPIST

## 2021-03-29 PROCEDURE — 97140 MANUAL THERAPY 1/> REGIONS: CPT | Performed by: PHYSICAL THERAPIST

## 2021-03-29 PROCEDURE — 97110 THERAPEUTIC EXERCISES: CPT | Performed by: PHYSICAL THERAPIST

## 2021-03-29 NOTE — PROGRESS NOTES
Physical Therapy Initial Evaluation and Plan of Care    Patient: Jed Correia   : 1945  Diagnosis/ICD-10 Code:  No primary diagnosis found.  Referring practitioner: KARIS Woo    Subjective Evaluation    History of Present Illness  Date of onset: 3/21/2021  Mechanism of injury: Slid down creek bank while working in the yard, left knee hyperflexed under him - no pop or shift but felt sharp pain in the knee, finished the task but knee continued to swell.   Went to Urgent care next day - x rays - negative   Referred to Dr Falk - saw Marla Barboza - fe;lt like it was an MCL sprain  - placed in hinged knee brace -   History of bilateral knee scopes - did have some residual symptoms in the left knee  Walked 2.5 miles TIW, rides airdyne 10 miles   Yard work      Patient Occupation: Retired - [employer]  Current pain ratin  At best pain ratin  At worst pain ratin  Location: Anterior medial knee shoots down into shin - chronic left lateral knee pain raditing into calf and into the arch  Quality: knife-like, discomfort, dull ache, sharp and tight  Relieving factors: rest, support and ice  Aggravating factors: stairs and squatting (twisting the knee)  Progression: improved    Social Support  Lives in: multiple-level home    Diagnostic Tests  X-ray: normal    Treatments  Current treatment: physical therapy  Patient Goals  Patient goal: be back to normal activities            Objective          Observations     Additional Knee Observation Details  Wearing hinged knee brace  Walks with slightly antalgic gait pattern with decreased stance on the left  Slight swelling medial knee    Palpation   Left   Tenderness of the distal biceps femoris.     Tenderness   Left Knee   Tenderness in the fibular head, lateral joint line, MCL (distal) and medial joint line.     Neurological Testing     Sensation     Knee   Left Knee   Intact: light touch    Active Range of Motion   Left Knee   Flexion: 93  degrees   Extension: 4 (from neutral) degrees     Patellar Mobility   Left Knee Patellar tendons within functional limits include the medial, lateral, superior and inferior.     Strength/Myotome Testing     Left Knee   Flexion: 4 (pain apprehension)  Prone flexion: 4 (pain apprehension)  Quadriceps contraction: good    Tests     Left Knee   Positive medial Taye, valgus stress test at 0 degrees and valgus stress test at 30 degrees.   Negative anterior drawer, anterior Lachman, posterior drawer and varus stress test at 0 degrees.     Additional Tests Details  Unable to safely tolerate Thessally's test today    Swelling     Left Knee Girth Measurement (cm)   Joint line: 40 cm    Right Knee Girth Measurement (cm)   Joint line: 40 cm          Assessment & Plan     Assessment  Impairments: abnormal gait, abnormal or restricted ROM, activity intolerance, impaired balance, impaired physical strength, lacks appropriate home exercise program, pain with function and weight-bearing intolerance  Assessment details: 75 y.o. male seen post left knee sprain after slipping down a creek bank presents with: 1. Intermittent knee pain, 2. Decreased knee AROM, 3. Antalgic gait pattern, 4. Tenderness to palpation medial joint line and MCL, 5. Swelling left knee, 6. Decreased tolerance for weight bearing and normal exercise routines  Prognosis: good  Functional Limitations: carrying objects, lifting, walking, pulling, pushing, standing and stooping  Goals  Plan Goals: Short Term Goals: 3 weeks  Patient will be able to tolerate initial exercises  Patient will have pain <5/10  Patient will be able to walk with a normal gait on level surfaces  Patient will be able to flex knee to >100*    Long Term Goals: 6 weeks  Patient will be independent in performing home exercise program.  Patient will have functional pain free knee AROM  Patient will be able to walk on level surface and up/down stairs and inclines without increased symptoms and  with a normal gait pattern  Patient will be able to return to light yard work without increased symptoms     Plan  Therapy options: will be seen for skilled physical therapy services  Planned modality interventions: electrical stimulation/Russian stimulation, cryotherapy and TENS  Planned therapy interventions: manual therapy, compression, joint mobilization, home exercise program, gait training, strengthening and stretching  Frequency: 2x week  Duration in visits: 12  Duration in weeks: 6  Treatment plan discussed with: patient  Plan details: Patient issued written HEP  Patient instructed to avoid painful ranges and activities         Manual Therapy:    15     mins  07591;  Therapeutic Exercise:    25     mins  20823;     Neuromuscular Luis:    0    mins  48273;    Therapeutic Activity:     5     mins  07310;  Rehab process, rest positions      Evaluation Time:     20  mins  Timed Treatment:   45   mins   Total Treatment:     85   mins    PT SIGNATURE: Jaz Lyons, PT   DATE TREATMENT INITIATED: 3/29/2021    Initial Certification  Certification Period: 6/27/2021  I certify that the therapy services are furnished while this patient is under my care.  The services outlined above are required by this patient, and will be reviewed every 90 days.     PHYSICIAN: Marla Barboza APRN      DATE:     Please sign and return via fax to [phone].. Thank you, [facility].

## 2021-04-01 ENCOUNTER — TREATMENT (OUTPATIENT)
Dept: PHYSICAL THERAPY | Facility: CLINIC | Age: 76
End: 2021-04-01

## 2021-04-01 DIAGNOSIS — S86.912D KNEE STRAIN, LEFT, SUBSEQUENT ENCOUNTER: Primary | ICD-10-CM

## 2021-04-01 DIAGNOSIS — Z74.09 IMPAIRED FUNCTIONAL MOBILITY, BALANCE, GAIT, AND ENDURANCE: ICD-10-CM

## 2021-04-01 PROCEDURE — G0283 ELEC STIM OTHER THAN WOUND: HCPCS | Performed by: PHYSICAL THERAPIST

## 2021-04-01 PROCEDURE — 97110 THERAPEUTIC EXERCISES: CPT | Performed by: PHYSICAL THERAPIST

## 2021-04-01 PROCEDURE — 97140 MANUAL THERAPY 1/> REGIONS: CPT | Performed by: PHYSICAL THERAPIST

## 2021-04-01 NOTE — PROGRESS NOTES
Physical Therapy Daily Progress Note    VISIT#: 2    Subjective   Jed Correia reports: that his knee is feeling better.  Notes that his pain is less frequent and he does feel like his stability has improved.        Objective   Temperature 97.5    Slight MJL tenderness    Slightly antalgic gait pattern    See Exercise, Manual, and Modality Logs for complete treatment.     Patient Education: purpose of taping    Assessment/Plan  Decreased pain and improved WB tolerance.  Very compliant with HEP.  Knee strain resolving.    Progress strengthening /stabilization /functional activity           Manual Therapy:    12     mins  92289;  Therapeutic Exercise:    25     mins  47661;     Neuromuscular Luis:    0    mins  19159;    Therapeutic Activity:     0     mins  48038;     Dry Needling                  0_  mins    Timed Treatment:   45   mins   Total Treatment:     60   mins    Jaz Lyons, PT  [place] License # [id number]  Physical Therapist

## 2021-04-05 ENCOUNTER — TREATMENT (OUTPATIENT)
Dept: PHYSICAL THERAPY | Facility: CLINIC | Age: 76
End: 2021-04-05

## 2021-04-05 DIAGNOSIS — Z74.09 IMPAIRED FUNCTIONAL MOBILITY, BALANCE, GAIT, AND ENDURANCE: ICD-10-CM

## 2021-04-05 DIAGNOSIS — S86.912D KNEE STRAIN, LEFT, SUBSEQUENT ENCOUNTER: Primary | ICD-10-CM

## 2021-04-05 PROCEDURE — 97035 APP MDLTY 1+ULTRASOUND EA 15: CPT | Performed by: PHYSICAL THERAPIST

## 2021-04-05 PROCEDURE — 97110 THERAPEUTIC EXERCISES: CPT | Performed by: PHYSICAL THERAPIST

## 2021-04-05 PROCEDURE — 97140 MANUAL THERAPY 1/> REGIONS: CPT | Performed by: PHYSICAL THERAPIST

## 2021-04-05 PROCEDURE — G0283 ELEC STIM OTHER THAN WOUND: HCPCS | Performed by: PHYSICAL THERAPIST

## 2021-04-05 NOTE — PROGRESS NOTES
Physical Therapy Daily Progress Note        VISIT#: 3      Jed Correia reports: His knee is coming along. He is able to do more now.   Current Pain Level:    0/10; Worst:   8/10 with twisting; Best:  0/10  Location Of Pain: medial knee and  Down lateral lower leg  Response to Previous Session: Good. No issues  Functional Deficits/Irritating Factors: Stairs, carrying objects, lifting, walking, pulling, pushing, standing and stooping  Progression: Improving  Compliance with HEP Reported: Yes    Objective   Presents: mild swelling in lateral knee  Increased sets/reps of:  none   Increased resistance on:  none  Added to Program: Sidestepping    AROM L knee flexion = 105 deg; Extension = lacking 6 deg     See Exercise, Manual, and Modality Logs for complete treatment.     Patient Education: Pt was educated on exercise biomechanical correctness, intensity, and speed.     Assessment: Pt has had a slight decrease in extension AROM since initial eval but good improvement in flexion. He reports improvement in his function and mobility at home. Noted some scissoring during reverse ambulation, with R>L in occurrence. Pt also ER his R foot during sidestepping but not the L.  Pt will continue to benefit from skilled PT interventions to address current functional deficits and impairments.       Plan: Progress to/Continue with current program. Try Mini Squats        Manual Therapy:    15     mins  87630;  Ultrasound:   10 mins 99720  Electrical Stimulation: __15____ mins 91923/  Iontophoresis: 0 mins 62747  Traction: 0 mins  29024  Work Conditionin mins 79500  Therapeutic Exercise:    20     mins  80926;     Neuromuscular Luis:    0    mins  80199;    Therapeutic Activity:     0     mins  21717;     Timed Treatment:   35   mins   Total Treatment:     60   mins    Shy Burrell PTA  [place] License # [id number]  Physical Therapist Assistant

## 2021-04-08 ENCOUNTER — TREATMENT (OUTPATIENT)
Dept: PHYSICAL THERAPY | Facility: CLINIC | Age: 76
End: 2021-04-08

## 2021-04-08 DIAGNOSIS — S86.912D KNEE STRAIN, LEFT, SUBSEQUENT ENCOUNTER: Primary | ICD-10-CM

## 2021-04-08 DIAGNOSIS — Z74.09 IMPAIRED FUNCTIONAL MOBILITY, BALANCE, GAIT, AND ENDURANCE: ICD-10-CM

## 2021-04-08 DIAGNOSIS — Z98.890 S/P RIGHT KNEE ARTHROSCOPY: ICD-10-CM

## 2021-04-08 PROCEDURE — 97112 NEUROMUSCULAR REEDUCATION: CPT | Performed by: PHYSICAL THERAPIST

## 2021-04-08 PROCEDURE — 97110 THERAPEUTIC EXERCISES: CPT | Performed by: PHYSICAL THERAPIST

## 2021-04-08 PROCEDURE — 97140 MANUAL THERAPY 1/> REGIONS: CPT | Performed by: PHYSICAL THERAPIST

## 2021-04-08 NOTE — PROGRESS NOTES
Physical Therapy Daily Progress Note    VISIT#: 4    Subjective   Jed Correia reports: that the knee is general is feeling much better but still does have some lateral knee and lower leg ache.        Objective   Tenderness in fibular head and with mobilization of hte head    Heel slide - 3 - 114*    See Exercise, Manual, and Modality Logs for complete treatment.     Patient Education: purpose of stabilizing fibular head.  Discussed progression of riding bike and walking on treadmill at home    Assessment/Plan  Patient with significant increase in knee motion and decrease in pain.  Very compliant with therapy and HEP. Relief of discomfort with fibular head taping    Progress strengthening /stabilization /functional activity           Manual Therapy:    20     mins  20704;  Therapeutic Exercise:    25/35     mins  13856;     Neuromuscular Luis:    10    mins  21322;    Therapeutic Activity:     5     mins  23026;  Kinesiotaping   Dry Needling                  0_  mins    Timed Treatment:   60   mins   Total Treatment:     70   mins    Jaz Lyons, PT  [place] License # [id number]  Physical Therapist

## 2021-04-12 ENCOUNTER — TREATMENT (OUTPATIENT)
Dept: PHYSICAL THERAPY | Facility: CLINIC | Age: 76
End: 2021-04-12

## 2021-04-12 DIAGNOSIS — S86.912D KNEE STRAIN, LEFT, SUBSEQUENT ENCOUNTER: Primary | ICD-10-CM

## 2021-04-12 DIAGNOSIS — Z74.09 IMPAIRED FUNCTIONAL MOBILITY, BALANCE, GAIT, AND ENDURANCE: ICD-10-CM

## 2021-04-12 PROCEDURE — 97140 MANUAL THERAPY 1/> REGIONS: CPT | Performed by: PHYSICAL THERAPIST

## 2021-04-12 PROCEDURE — 97110 THERAPEUTIC EXERCISES: CPT | Performed by: PHYSICAL THERAPIST

## 2021-04-12 PROCEDURE — G0283 ELEC STIM OTHER THAN WOUND: HCPCS | Performed by: PHYSICAL THERAPIST

## 2021-04-12 NOTE — PROGRESS NOTES
Physical Therapy Daily Progress Note        VISIT#: 5      Jed Correia reports: He has been stiff and sore the past couple of days. He is having new llateral thigh pain. He is continuing to get lateral knee pain both superior and distal to the joint line. He gets sharp medial pain when lying on either side and if he hits his foot against something.   Current Pain Level:    1/10; Worst:   3-4/10; Best:  1/10  Location Of Pain: Medial and later knee and lateral thigh  Response to Previous Session: Good. No issues  Functional Deficits/Irritating Factors: Stairs, carrying, lifting, walking, pushing, pulling, prolonged standing  Progression: No change since last PT session  Compliance with HEP Reported: Yes    Objective   Presents: Normal ambulation, edema present in knee and distal quad  Increased sets/reps of:  none   Increased resistance on:  none  Added to Program: kinesiotape to IT band    AROM L knee extension = lacking 2 deg, flexion = 110 deg      See Exercise, Manual, and Modality Logs for complete treatment.     Patient Education: Pt was educated on exercise biomechanical correctness, intensity, and speed.     Assessment:  Noted band of tightness and pain with pressure in lateral quad about long-term down the leg.  Pt will continue to benefit from skilled PT interventions to address current functional deficits and impairments.       Plan: Progress to/Continue with current program.         Manual Therapy:    30     mins  00134;  Ultrasound:   0 mins 65387  Electrical Stimulation: __15____ mins 55157/  Iontophoresis: 0 mins 66797  Traction: 0 mins  86200  Work Conditionin mins 10820  Therapeutic Exercise:    15     mins  00076;     Neuromuscular Luis:    0    mins  90288;    Therapeutic Activity:     0     mins  58457;     Timed Treatment:   45   mins   Total Treatment:     60   mins    Shy Burrell PTA  [place] License # [id number]  Physical Therapist Assistant

## 2021-04-15 ENCOUNTER — OFFICE VISIT (OUTPATIENT)
Dept: ORTHOPEDIC SURGERY | Facility: CLINIC | Age: 76
End: 2021-04-15

## 2021-04-15 ENCOUNTER — TELEPHONE (OUTPATIENT)
Dept: ORTHOPEDIC SURGERY | Facility: CLINIC | Age: 76
End: 2021-04-15

## 2021-04-15 ENCOUNTER — TREATMENT (OUTPATIENT)
Dept: PHYSICAL THERAPY | Facility: CLINIC | Age: 76
End: 2021-04-15

## 2021-04-15 VITALS — BODY MASS INDEX: 28.73 KG/M2 | HEIGHT: 69 IN | TEMPERATURE: 96.8 F | WEIGHT: 194 LBS

## 2021-04-15 DIAGNOSIS — S83.242A TEAR OF MEDIAL MENISCUS OF LEFT KNEE, CURRENT, UNSPECIFIED TEAR TYPE, INITIAL ENCOUNTER: Primary | ICD-10-CM

## 2021-04-15 DIAGNOSIS — S86.912D KNEE STRAIN, LEFT, SUBSEQUENT ENCOUNTER: Primary | ICD-10-CM

## 2021-04-15 DIAGNOSIS — Z74.09 IMPAIRED FUNCTIONAL MOBILITY, BALANCE, GAIT, AND ENDURANCE: ICD-10-CM

## 2021-04-15 PROCEDURE — 97140 MANUAL THERAPY 1/> REGIONS: CPT | Performed by: PHYSICAL THERAPIST

## 2021-04-15 PROCEDURE — G0283 ELEC STIM OTHER THAN WOUND: HCPCS | Performed by: PHYSICAL THERAPIST

## 2021-04-15 PROCEDURE — 97110 THERAPEUTIC EXERCISES: CPT | Performed by: PHYSICAL THERAPIST

## 2021-04-15 PROCEDURE — 97530 THERAPEUTIC ACTIVITIES: CPT | Performed by: PHYSICAL THERAPIST

## 2021-04-15 PROCEDURE — 99213 OFFICE O/P EST LOW 20 MIN: CPT | Performed by: ORTHOPAEDIC SURGERY

## 2021-04-15 NOTE — TELEPHONE ENCOUNTER
"  Caller: ALEX HUANG    Relationship: SELF    Best call back number: [phone]    What orders are you requesting (i.e. lab or imaging): MRI    In what timeframe would the patient need to come in: ASAP    Where will you receive your lab/imaging services: [facility]    Additional notes: PATIENT WAS TOLD THAT THE FIRST AVAILABLE 5/8/21 AND 4/22/21 IN [place]- PATIENT STATED \"THAT IS OUTRAGEOUS\" AND THE OFFICE CAN PUSH THE ORDER TO GET HIM IN SOONER. HIS SON DOES NOT WANT HIM TO GO THE [place] OFFICE BECAUSE THEY DO NOT SPECIALIZE IN musculoskeletal SYSTEM - PLEASE ADVISE PATIENT        "

## 2021-04-15 NOTE — TELEPHONE ENCOUNTER
Keturah, this patient is miserable he cannot wait until the end of next week for an MRI. Can you please try to get him scheduled at [facility]

## 2021-04-15 NOTE — PROGRESS NOTES
MD Letter - Reassessment  Patient: Jed Correia   : 1945    Date of Initial Visit: Type: THERAPY  Noted: 3/29/2021  Today's Date: 4/15/2021  Patient seen for 6 sessions    Treatment has included: therapeutic exercise, neuromuscular re-education, manual therapy, electrical stimulation, cryotherapy and positional taping    Subjective   Jed states that his knee is feeling much better than it was prior to therapy.  He states that the medial knee pain has nearly resolved.  He is still having some lateral knee/calf pain with prolonged WB.  He states that the lateral pain has decreased with therpay but still not as good as desired.  Yesterday was able to walk for a mile and ride his bike for 15 minutes without increased.  He notes that he still has swelling in the knee.  He states that his pain now varies from 0-5/10 compared to 0-8/10 upon initial evaluation.  He still has pain with twisting the knee.      Objective - he presents with slight swelling in the knee walking with a normal symmetrical gait pattern.  Knee AROM - 3-118*  Strength - quads 4+/5, hamstrings 5/5  Sensation - intact  Palpation - Minimal medial joint line tenderness.  Tenderness noted anterior to the fibular head and with AP glide of the fibular head.  Left knee joint line circumference - 41 cm  Special tests - negative for any ligamentous instability  Activity tolerances - he is able to walk a mile and ride a bike for 15 minutes without pain but still does have some difficulty with stairs and with transitional movements after prolonged positioning     Assessment/Plan  Patient has demonstrated moderate improvement since the initiation of therapy.  The pain has decreased in intensity and frequency.  The motion has increased to a functional level.  The activity tolerances have increased but not to desired levels.  I feel that the patient would benefit from continued therapy and potentially additional diagnostic testing for lateral knee symptoms.   If you have any questions concerning the care, please do not hesitate to contact me.          PT Signature: Jaz Lyons, PT        Manual Therapy:    18     mins  66434;  Therapeutic Exercise:    15/40     mins  76058;     Neuromuscular Luis:    0    mins  85400;    Therapeutic Activity:     10     mins  83900 Assessed for MD;       Timed Treatment:   43   mins   Total Treatment:     90   mins

## 2021-04-19 ENCOUNTER — TREATMENT (OUTPATIENT)
Dept: PHYSICAL THERAPY | Facility: CLINIC | Age: 76
End: 2021-04-19

## 2021-04-19 PROCEDURE — 97035 APP MDLTY 1+ULTRASOUND EA 15: CPT | Performed by: PHYSICAL THERAPIST

## 2021-04-19 PROCEDURE — G0283 ELEC STIM OTHER THAN WOUND: HCPCS | Performed by: PHYSICAL THERAPIST

## 2021-04-19 PROCEDURE — 97110 THERAPEUTIC EXERCISES: CPT | Performed by: PHYSICAL THERAPIST

## 2021-04-19 PROCEDURE — 97140 MANUAL THERAPY 1/> REGIONS: CPT | Performed by: PHYSICAL THERAPIST

## 2021-04-19 NOTE — PROGRESS NOTES
Physical Therapy Daily Progress Note        VISIT#: 7      Jed Correia reports: His knee is about the same and continues to swell. He is supposed to have an MRI done but it is not scheduled yet. His MD wants to look into his knee further. He may have a Meniscus tear.   Current Pain Level:    0/10; Worst:   3-4/10; Best:  0/10  Location Of Pain: Patella tendon area, lateral knee. No pain in lateral thigh now  Response to Previous Session: Good No issues  Functional Deficits/Irritating Factors: Stairs, carrying, lifting, pushing, pulling prolonged standing, walking  Progression: no significant changes  Compliance with HEP Reported: Yet    Objective   Presents: Normal ambulation. Edema present  Increased sets/reps of:  none   Increased resistance on:  SAQs, Prone knee bend  Added to Program: none    AROM L knee flexion = 115 deg; Extension = Lacking 1-2  deg      See Exercise, Manual, and Modality Logs for complete treatment.     Patient Education: Pt was educated on exercise biomechanical correctness, intensity, and speed.     Assessment:  Pt demonstrated improved knee flexion today but no improvement in extension. Pt only lacking 1-2 deg. After last visit to referring provider, an MRI is being ordered to look further into his knee issues as pt has not made as much progress with PT as has been hoped.  Pt will continue to benefit from skilled PT interventions to address current functional deficits and impairments.       Plan: Progress to/Continue with current program. Add TKE?        Manual Therapy:    15     mins  88522;  Ultrasound:   10 mins 96637  Electrical Stimulation: __15____ mins 81818/  Iontophoresis: 0 mins 17478  Traction: 0 mins  11160  Work Conditionin mins 69832  Therapeutic Exercise:    20     mins  34985;     Neuromuscular Lusi:    0    mins  65887;    Therapeutic Activity:     0     mins  59312;     Timed Treatment:   35   mins   Total Treatment:     60   mins    Shy Burrell, MOLLY  [place]  License # [id number]  Physical Therapist Assistant

## 2021-04-19 NOTE — TELEPHONE ENCOUNTER
Caller: ALEX HUANG  Relationship to Patient: SELF     Phone Number: [phone]  Reason for Call: PATIENT IS CALLING BACK ABOUT THE MRI STATING THAT [facility] DOES NOT HAVE A REFERRAL FOR HIM

## 2021-04-21 ENCOUNTER — TELEPHONE (OUTPATIENT)
Dept: ORTHOPEDIC SURGERY | Facility: CLINIC | Age: 76
End: 2021-04-21

## 2021-04-21 NOTE — TELEPHONE ENCOUNTER
Caller: ALEX HUANG     Relationship to patient: SELF     Best call back number:[phone]    Patient is needing: PT WAS CALLING TO INFORM  THAT MRI WAS DONE LAST NIGHT 04/20/2021 AND THE RESULTS SHOULD BE AVAILABLE TO VIEW 04/21/2021 OR 04/22/2021.OFFICE PLEASE ADVISE

## 2021-04-22 ENCOUNTER — TREATMENT (OUTPATIENT)
Dept: PHYSICAL THERAPY | Facility: CLINIC | Age: 76
End: 2021-04-22

## 2021-04-22 ENCOUNTER — HOSPITAL ENCOUNTER (OUTPATIENT)
Dept: MRI IMAGING | Facility: HOSPITAL | Age: 76
End: 2021-04-22

## 2021-04-22 DIAGNOSIS — Z74.09 IMPAIRED FUNCTIONAL MOBILITY, BALANCE, GAIT, AND ENDURANCE: ICD-10-CM

## 2021-04-22 DIAGNOSIS — S86.912D KNEE STRAIN, LEFT, SUBSEQUENT ENCOUNTER: Primary | ICD-10-CM

## 2021-04-22 PROCEDURE — G0283 ELEC STIM OTHER THAN WOUND: HCPCS | Performed by: PHYSICAL THERAPIST

## 2021-04-22 PROCEDURE — 97112 NEUROMUSCULAR REEDUCATION: CPT | Performed by: PHYSICAL THERAPIST

## 2021-04-22 PROCEDURE — 97110 THERAPEUTIC EXERCISES: CPT | Performed by: PHYSICAL THERAPIST

## 2021-04-22 PROCEDURE — 97140 MANUAL THERAPY 1/> REGIONS: CPT | Performed by: PHYSICAL THERAPIST

## 2021-04-22 NOTE — TELEPHONE ENCOUNTER
There is a small tear of the medial meniscus and a fair amount of arthritis I would like him to follow-up and let sit down and talk about what our next best step is

## 2021-04-22 NOTE — TELEPHONE ENCOUNTER
Called patient and informed him briefly of results. He is agreeable to make follow up appointment to discuss next steps. Transferred call to scheduling for appointment with KIMO.

## 2021-04-22 NOTE — PROGRESS NOTES
Physical Therapy Daily Progress Note    VISIT#: 8    Subjective   Jed Correia reports: that his pain has localized to the patellar tendon area.  Notices a knot in the ITB region. States that his pain varies from 0-7/10.  7/10 with descending steps.  Straight plane walking does not hurt - it is inclines and steps.      Objective   Tenderness in anterior medial joint line    Moderate anterior knee swelling    Heel slide 3-110* pre exercise    See Exercise, Manual, and Modality Logs for complete treatment.     Patient Education: continue HEP    Assessment/Plan  Anterior medial knee pain persists yet decreased in intensity compared to initial status.  Suspect medial meniscal involvement.  Persistent swelling in the knee.  Some pain relief with use of Kinesiotape to unload medial joint.     Progress strengthening /stabilization /functional activity           Manual Therapy:    12     mins  46078;  Therapeutic Exercise:    17/35     mins  28627;     Neuromuscular Luis:    10    mins  39417;    Therapeutic Activity:     0     mins  07280;     Dry Needling                  0_  mins    Timed Treatment:   39   mins   Total Treatment:     70   mins    Jaz Lyons, PT  [place] License # [id number]  Physical Therapist

## 2021-04-26 ENCOUNTER — TREATMENT (OUTPATIENT)
Dept: PHYSICAL THERAPY | Facility: CLINIC | Age: 76
End: 2021-04-26

## 2021-04-26 DIAGNOSIS — Z74.09 IMPAIRED FUNCTIONAL MOBILITY, BALANCE, GAIT, AND ENDURANCE: ICD-10-CM

## 2021-04-26 DIAGNOSIS — S86.912D KNEE STRAIN, LEFT, SUBSEQUENT ENCOUNTER: Primary | ICD-10-CM

## 2021-04-26 PROCEDURE — 97112 NEUROMUSCULAR REEDUCATION: CPT | Performed by: PHYSICAL THERAPIST

## 2021-04-26 PROCEDURE — 97110 THERAPEUTIC EXERCISES: CPT | Performed by: PHYSICAL THERAPIST

## 2021-04-26 PROCEDURE — 97140 MANUAL THERAPY 1/> REGIONS: CPT | Performed by: PHYSICAL THERAPIST

## 2021-04-26 NOTE — PROGRESS NOTES
Physical Therapy Daily Progress Note        VISIT#: 9      Jed Correia reports: Pt reports he got the results of the MRI and he has a meniscus tear in the front of the knee. He also has some arthritis in the front of his knee. He will see Dr. Falk on Thursday afternoon. He notices when he walks he lacks knee extension  Current Pain Level:    0/10; Worst:   5-6/10 stairs; Best:  0/10  Location Of Pain: Lateral Knee  Response to Previous Session: Good. No issues  Functional Deficits/Irritating Factors: stairs, inclines  Progression: no significant changes since last PT visit.   Compliance with HEP Reported: Yes    Objective   Presents: Mild edema in L knee, normal ambulation, Extension lag during SLR  Increased sets/reps of:  none   Increased resistance on:  SLR, SAQs  Added to Program: Hurdler stretch for HS        See Exercise, Manual, and Modality Logs for complete treatment.     Patient Education: Pt was educated on exercise biomechanical correctness, intensity, and speed.     Assessment:  Pt continues with mild edema, pain, loss of strength, and range of motion in his L knee. He recently learned he has a meniscus tear and will be returning to his MD on Thursday of this week to discuss possible further medical intervention or just continuation of PT.  Pt will continue to benefit from skilled PT interventions to address current functional deficits and impairments.       Plan: Progress to/Continue with current program. MD note next session        Manual Therapy:    10    mins  91795;  Ultrasound:   0 mins 03384  Electrical Stimulation: __0____ mins 36549/  Iontophoresis: 0 mins 68195  Traction: 0 mins  71604  Work Conditionin mins 40816  Therapeutic Exercise:    35     mins  75174;     Neuromuscular Luis:   15    mins  29486;    Therapeutic Activity:     0     mins  06977;     Timed Treatment:   60   mins   Total Treatment:     60   mins    MOLLY Chakraborty License # [id number]  Physical Therapist  Assistant

## 2021-04-28 ENCOUNTER — TREATMENT (OUTPATIENT)
Dept: PHYSICAL THERAPY | Facility: CLINIC | Age: 76
End: 2021-04-28

## 2021-04-28 DIAGNOSIS — Z74.09 IMPAIRED FUNCTIONAL MOBILITY, BALANCE, GAIT, AND ENDURANCE: ICD-10-CM

## 2021-04-28 DIAGNOSIS — S86.912D KNEE STRAIN, LEFT, SUBSEQUENT ENCOUNTER: Primary | ICD-10-CM

## 2021-04-28 PROCEDURE — 97530 THERAPEUTIC ACTIVITIES: CPT | Performed by: PHYSICAL THERAPIST

## 2021-04-28 PROCEDURE — 97112 NEUROMUSCULAR REEDUCATION: CPT | Performed by: PHYSICAL THERAPIST

## 2021-04-28 PROCEDURE — 97110 THERAPEUTIC EXERCISES: CPT | Performed by: PHYSICAL THERAPIST

## 2021-04-28 NOTE — PROGRESS NOTES
MD Letter  Patient: Jed Correia   : 1945    Date of Initial Visit: Type: THERAPY  Noted: 3/29/2021  Today's Date: 2021  Patient seen for 10 sessions    Treatment has included: therapeutic exercise, neuromuscular re-education, manual therapy, electrical stimulation and cryotherapy    Subjective Pt reports a 90% improvement since starting PT. He is now only having some localized pain in the lateral knee and slight pain in mid IT band.   Current Pain Level:  0/10; Worst Pain level: 6/10; Best Pain Level: 0/10  Functional Deficits/Impairments: Stairs and going down inclines     Objective   AROM - L knee flexion = 125 deg; Extension = :aclomg 2 deg deg  Strength - MMT L knee Flexion = 5-/5; Extension = 5/5;  Palpation -  Tender in IT band at approx mid thigh  Activity tolerance -     @PTASSESSMENT/PLAN@  Patient has demonstrated very good improvement since the initiation of therapy.  The pain has decreased.  The motion has improved.  The activity tolerances have increased.  I feel that the patient would benefit from additional 2 weeks of physical therapy for continued strengthening and stabilization.  If you have any questions concerning the care, please do not hesitate to contact me.              PT Signature: Shy Burrell, MOLLY        Electrical Stimulation: __0____ mins 47400/  Ultrasound:   0 mins 91604  Work Conditionin mins 20976  Iontophoresis: 0 mins 26529  Traction: 0 mins 79679  Manual Therapy:    0     mins  33829;  Therapeutic Exercise:    30     mins  52511;     Neuromuscular Luis:    15    mins  90434;    Therapeutic Activity:     10     mins  92405;     Timed Treatment:   55   mins   Total Treatment:     55   mins

## 2021-04-29 ENCOUNTER — OFFICE VISIT (OUTPATIENT)
Dept: ORTHOPEDIC SURGERY | Facility: CLINIC | Age: 76
End: 2021-04-29

## 2021-04-29 VITALS — WEIGHT: 199 LBS | TEMPERATURE: 96.4 F | BODY MASS INDEX: 29.47 KG/M2 | HEIGHT: 69 IN

## 2021-04-29 DIAGNOSIS — M17.0 BILATERAL PRIMARY OSTEOARTHRITIS OF KNEE: Primary | ICD-10-CM

## 2021-04-29 PROCEDURE — 20610 DRAIN/INJ JOINT/BURSA W/O US: CPT | Performed by: ORTHOPAEDIC SURGERY

## 2021-04-29 PROCEDURE — 99213 OFFICE O/P EST LOW 20 MIN: CPT | Performed by: ORTHOPAEDIC SURGERY

## 2021-04-29 RX ORDER — LIDOCAINE HYDROCHLORIDE 20 MG/ML
4 INJECTION, SOLUTION EPIDURAL; INFILTRATION; INTRACAUDAL; PERINEURAL
Status: COMPLETED | OUTPATIENT
Start: 2021-04-29 | End: 2021-04-29

## 2021-04-29 RX ORDER — METHYLPREDNISOLONE ACETATE 80 MG/ML
80 INJECTION, SUSPENSION INTRA-ARTICULAR; INTRALESIONAL; INTRAMUSCULAR; SOFT TISSUE
Status: COMPLETED | OUTPATIENT
Start: 2021-04-29 | End: 2021-04-29

## 2021-04-29 RX ADMIN — METHYLPREDNISOLONE ACETATE 80 MG: 80 INJECTION, SUSPENSION INTRA-ARTICULAR; INTRALESIONAL; INTRAMUSCULAR; SOFT TISSUE at 16:12

## 2021-04-29 RX ADMIN — LIDOCAINE HYDROCHLORIDE 4 ML: 20 INJECTION, SOLUTION EPIDURAL; INFILTRATION; INTRACAUDAL; PERINEURAL at 16:12

## 2021-04-29 NOTE — PROGRESS NOTES
Left Knee MRI Follow Up      Patient: Jed Correia        YOB: 1945            Chief Complaints: Left Knee pain      History of Present Illness: The patient is here follow-up of an MRI of the knee MRI demonstrates a small medial meniscus tear but he does have some degenerative changes at the medial patellofemoral compartment I have seen him about a year ago we had injected him he got some temporary relief but we did do an MRI which shows the above listed findings.  He states he has a little sore he is a difficulty straightening his knee but not sure he wants to proceed with arthroscopy which we done on the other side and he actually did great      Physical Exam: 75 y.o. male  General Appearance:    Alert, cooperative, in no acute distress                 There were no vitals filed for this visit.     Patient is alert and read ×3 no acute distress appears her above-listed at height weight and age.  Affect is normal respiratory rate is normal unlabored. Heart rate regular rate rhythm, sclera, dentition and hearing are normal for the purpose of this exam.      Ortho Exam  Physical exam of the left knee reveals no effusion, no erythema.  It mild loss of extension and full flexion  Patient has mild varus alignment.  They have mild tenderness to palpation about the medial compartment, no tenderness laterally..  The patient has a negative bounce home, negative Taye and a stable ligamentous exam.  Quad tone is reasonable and symmetric.  There are no overlying skin changes no lymphedema no lymphadenopathy.  There is good hip range of motion which is full symmetric and asymptomatic and a normal ankle exam.        MRI Results:  MRIs as above I reviewed the films myself and agree with the findings  Large Joint Arthrocentesis: L knee  Date/Time: 4/29/2021 4:12 PM  Consent given by: patient  Site marked: site marked  Timeout: Immediately prior to procedure a time out was called to verify the correct patient,  procedure, equipment, support staff and site/side marked as required   Supporting Documentation  Indications: pain   Procedure Details  Location: knee - L knee  Preparation: Patient was prepped and draped in the usual sterile fashion  Needle gauge: 21G.  Approach: anteromedial  Medications administered: 4 mL lidocaine PF 2% 2 %; 80 mg methylPREDNISolone acetate 80 MG/ML  Patient tolerance: patient tolerated the procedure well with no immediate complications            Assessment/Plan: Left knee medial meniscus tear with some degenerative changes I really think his symptoms are more degenerative changes I would really like to inject this 1 more time as a diagnostic and therapeutic tool if he fails to improve in adequate fashion could consider arthroscopy which he understands

## 2021-06-21 ENCOUNTER — DOCUMENTATION (OUTPATIENT)
Dept: PHYSICAL THERAPY | Facility: CLINIC | Age: 76
End: 2021-06-21

## 2021-06-21 NOTE — PROGRESS NOTES
Discharge Summary  Discharge Summary from Physical Therapy Report                Patient Information  Jed Correia  1945    Dates  PT visit: 03/29/21 yo 04/28/21  Number of Visits: 10     Discharge Status of Patient: See PT Note dated 04/28/21    Goals: Partially Met    Visit Diagnoses: Knee strain, left, subsequent encounter +1      Discharge Plan: Patient to return to referring/providing physician    Comments Recommended another 2 weeks of PT but pt did not return to PT after return to referring provider    Date of Discharge 06/21/21        Shy Burrell, MOLLY  Physical Therapist Assistant

## 2021-11-05 ENCOUNTER — TELEPHONE (OUTPATIENT)
Dept: ORTHOPEDIC SURGERY | Facility: CLINIC | Age: 76
End: 2021-11-05

## 2021-11-05 NOTE — TELEPHONE ENCOUNTER
Caller: ALEX HUANG    Relationship to patient: SELF    Best call back number: [phone]    Chief complaint: LEFT KNEE    Type of visit: INJECTION    Requested date: 11/15 11/16 OR 11/17    Additional notes:PATIENT WANTS TO SCHEDULE CORTISONE INJECTION

## 2021-11-18 ENCOUNTER — CLINICAL SUPPORT (OUTPATIENT)
Dept: ORTHOPEDIC SURGERY | Facility: CLINIC | Age: 76
End: 2021-11-18

## 2021-11-18 VITALS — TEMPERATURE: 97.4 F | HEIGHT: 69 IN | WEIGHT: 203 LBS | BODY MASS INDEX: 30.07 KG/M2

## 2021-11-18 DIAGNOSIS — M25.562 LEFT KNEE PAIN, UNSPECIFIED CHRONICITY: Primary | ICD-10-CM

## 2021-11-18 DIAGNOSIS — M17.0 BILATERAL PRIMARY OSTEOARTHRITIS OF KNEE: ICD-10-CM

## 2021-11-18 PROCEDURE — 20610 DRAIN/INJ JOINT/BURSA W/O US: CPT | Performed by: NURSE PRACTITIONER

## 2021-11-18 RX ADMIN — METHYLPREDNISOLONE ACETATE 80 MG: 80 INJECTION, SUSPENSION INTRA-ARTICULAR; INTRALESIONAL; INTRAMUSCULAR; SOFT TISSUE at 12:54

## 2021-11-18 NOTE — PROGRESS NOTES
[facility]  Follow Up Visit      Patient Name: Jed Correia  : 1945  Primary Care Physician: Myke Landaverde MD        Chief Complaint: Left knee pain    HPI:   Jed Correia is a 76 y.o. year old who presents today for left knee pain.  Patient has a history of chronic left knee pain, he had an MRI which showed a small medial meniscus tear but is done well with conservative treatments.  He has been evaluated by Dr. Falk in the past they elected to continue with conservative care.  He does have a history of right knee arthroscopy in the past, right knee is still feeling well today.    His last injections were in 2021, he is done quite well up until recently which he has noticed gradual return in his pain.    ROS:  ROS negative except as listed in the HPI.    Allergies:   Allergies   Allergen Reactions   • Bacitracin-Polymyxin B Unknown - Low Severity   • Neomycin-Bacitracin Zn-Polymyx Unknown - Low Severity   • Iodine Rash   • Latex Rash       Medications:  Current Outpatient Medications on File Prior to Visit   Medication Sig   • amLODIPine (NORVASC) 2.5 MG tablet Take 2.5 mg by mouth Daily.   • aspirin 81 MG chewable tablet Chew 81 mg Daily.   • atorvastatin (LIPITOR) 10 MG tablet Take 10 mg by mouth Daily.   • buPROPion SR (WELLBUTRIN SR) 150 MG 12 hr tablet Take 150 mg by mouth.   • Cholecalciferol (VITAMIN D-3) 1000 UNITS capsule Take 2,000 Units by mouth Daily.   • clopidogrel (PLAVIX) 75 MG tablet Take 75 mg by mouth Daily.   • Coenzyme Q10 (CO Q 10) 10 MG capsule Take 1 tablet by mouth Daily.   • ezetimibe (ZETIA) 10 MG tablet Take 10 mg by mouth Daily.   • folic acid (FOLVITE) 1 MG tablet Take 1 mg by mouth Daily.   • glucosamine-chondroitin 500-400 MG capsule capsule Take 2 capsules by mouth Daily.   • HYDROcodone-acetaminophen (NORCO) 5-325 MG per tablet Take 1 tablet by mouth every 4 (Four) hours as needed for severe pain.   • Multiple Vitamins-Minerals (CENTRUM SILVER ADULT 50+  PO) Take 1 tablet by mouth Daily.   • nitroglycerin (NITROSTAT) 0.4 MG SL tablet Place 0.4 mg under the tongue Every 5 (Five) Minutes As Needed.   • Omega-3 Fatty Acids (FISH OIL) 1000 MG capsule capsule Take 1,000 mg by mouth Daily With Breakfast.   • pantoprazole (PROTONIX) 40 MG EC tablet Take 40 mg by mouth Daily.   • PROAIR RESPICLICK 108 (90 BASE) MCG/ACT inhaler 1-2 inhalations every 6-8 hours as needed   • Probiotic Product (PROBIOTIC DAILY PO) Take 1 capsule by mouth Daily.   • ramipril (ALTACE) 10 MG capsule Take 10 mg by mouth Daily.   • venlafaxine (EFFEXOR) 75 MG tablet Take 75 mg by mouth Daily.   • vitamin B-12 (CYANOCOBALAMIN) 100 MCG tablet Take 50 mcg by mouth Daily.   • vitamin B-6 (PYRIDOXINE) 50 MG tablet Take 50 mg by mouth Daily.     No current facility-administered medications on file prior to visit.       Physical Exam:   76 y.o. male  Body mass index is 29.98 kg/m²., 92.1 kg (203 lb)  Vitals:    11/18/21 1253   Temp: 97.4 °F (36.3 °C)     General: Alert, cooperative, appears well and in no observable distress. Appears stated age and BMI as listed above.  Respiratory: Normal respiratory effort.  Skin: Warm & well perfused; appropriate skin turgor.  Psych: Appropriate mood & affect.    MSK Exam:  Left knee with trace effusion, no deformity or wounds range of motion 0 to 130 degrees.     Radiology:    No new images were needed at the visit today.     Procedure:   See Procedure Note: The potential risks and benefits of performing a diagnostic and therapeutic injection were discussed with the patient prior to procedure. Risks include, but are not limited to infection, swelling, transient increase in pain, bleeding, bruising. Patient was advised that injections are a diagnostic and therapeutic tool meaning they may not alleviate symptoms at all, or may only provide partial or temporary relief.       Misc. Data/Labs: N/A    Assessment & Plan:    This is a 76-year-old male with left knee pain  likely degenerative in nature.  An MRI did show he had a small medial meniscus tear but I do not appreciate any mechanical symptoms or complaints in our discussion today.  Plan is to proceed with cortisone injection again.  He can certainly follow-up in 3 months or as needed based upon how he does and return of his pain symptoms.    Injection provided in the office today. Tolerated well with no immediate complications. Injection precautions discussed with the patient., Patient encouraged to call with questions or concerns prior to follow up.  and Will discuss with attending surgeon as needed.       ICD-10-CM ICD-9-CM   1. Left knee pain, unspecified chronicity  M25.562 719.46   2. Bilateral primary osteoarthritis of knee  M17.0 715.16     No orders of the defined types were placed in this encounter.    Orders Placed This Encounter   Procedures   • Large Joint Arthrocentesis: L knee     Return in about 3 months (around 2/18/2022), or if symptoms worsen or fail to improve.    KARIS Sue      Dictated Utilizing CORD:USE Cord Blood Bankon Dictation    Large Joint Arthrocentesis: L knee  Date/Time: 11/18/2021 12:54 PM  Consent given by: patient  Site marked: site marked  Timeout: Immediately prior to procedure a time out was called to verify the correct patient, procedure, equipment, support staff and site/side marked as required   Supporting Documentation  Indications: pain   Procedure Details  Location: knee - L knee  Preparation: Patient was prepped and draped in the usual sterile fashion  Needle gauge: 21G.  Approach: medial  Medications administered: 80 mg methylPREDNISolone acetate 80 MG/ML; 4 mL lidocaine (cardiac)  Patient tolerance: patient tolerated the procedure well with no immediate complications

## 2021-11-22 RX ORDER — METHYLPREDNISOLONE ACETATE 80 MG/ML
80 INJECTION, SUSPENSION INTRA-ARTICULAR; INTRALESIONAL; INTRAMUSCULAR; SOFT TISSUE
Status: COMPLETED | OUTPATIENT
Start: 2021-11-18 | End: 2021-11-18

## 2022-04-29 ENCOUNTER — TELEPHONE (OUTPATIENT)
Dept: ORTHOPEDIC SURGERY | Facility: CLINIC | Age: 77
End: 2022-04-29

## 2022-04-29 NOTE — TELEPHONE ENCOUNTER
Caller: Jde Correia    Relationship to patient: Self    Best call back number:     Chief complaint: LEFT KNEE     Type of visit: FUP INJECTION

## 2022-05-12 ENCOUNTER — CLINICAL SUPPORT (OUTPATIENT)
Dept: ORTHOPEDIC SURGERY | Facility: CLINIC | Age: 77
End: 2022-05-12

## 2022-05-12 VITALS — TEMPERATURE: 98 F | BODY MASS INDEX: 30.07 KG/M2 | HEIGHT: 69 IN | WEIGHT: 203 LBS

## 2022-05-12 DIAGNOSIS — M17.0 BILATERAL PRIMARY OSTEOARTHRITIS OF KNEE: ICD-10-CM

## 2022-05-12 DIAGNOSIS — G89.29 CHRONIC PAIN OF LEFT KNEE: Primary | ICD-10-CM

## 2022-05-12 DIAGNOSIS — M25.562 CHRONIC PAIN OF LEFT KNEE: Primary | ICD-10-CM

## 2022-05-12 PROCEDURE — 99213 OFFICE O/P EST LOW 20 MIN: CPT | Performed by: NURSE PRACTITIONER

## 2022-05-12 PROCEDURE — 73562 X-RAY EXAM OF KNEE 3: CPT | Performed by: NURSE PRACTITIONER

## 2022-05-12 PROCEDURE — 20610 DRAIN/INJ JOINT/BURSA W/O US: CPT | Performed by: NURSE PRACTITIONER

## 2022-05-12 RX ORDER — LIDOCAINE HYDROCHLORIDE 20 MG/ML
4 INJECTION, SOLUTION EPIDURAL; INFILTRATION; INTRACAUDAL; PERINEURAL
Status: COMPLETED | OUTPATIENT
Start: 2022-05-12 | End: 2022-05-12

## 2022-05-12 RX ORDER — METHYLPREDNISOLONE ACETATE 80 MG/ML
80 INJECTION, SUSPENSION INTRA-ARTICULAR; INTRALESIONAL; INTRAMUSCULAR; SOFT TISSUE
Status: COMPLETED | OUTPATIENT
Start: 2022-05-12 | End: 2022-05-12

## 2022-05-12 RX ADMIN — LIDOCAINE HYDROCHLORIDE 4 ML: 20 INJECTION, SOLUTION EPIDURAL; INFILTRATION; INTRACAUDAL; PERINEURAL at 09:56

## 2022-05-12 RX ADMIN — METHYLPREDNISOLONE ACETATE 80 MG: 80 INJECTION, SUSPENSION INTRA-ARTICULAR; INTRALESIONAL; INTRAMUSCULAR; SOFT TISSUE at 09:56

## 2022-05-12 NOTE — PROGRESS NOTES
[facility]  Follow Up Visit      Patient Name: Jed Correia  : 1945  Primary Care Physician: Myke Landaverde MD        Chief Complaint: Left knee pain    HPI:   Jed Correia is a 76 y.o. year old who presents today for left knee pain.  Patient has a history of chronic left knee pain, he has had an MRI in the past about a year ago which showed evidence of prior meniscus surgery on the medial sided degenerative changes to the lateral compartment otherwise, no big meniscus tear and some arthritis.  He enjoys staying active, he works hard to stay strong and has typically done well with conservative measures in the past.  No new symptoms.  He does have an upcoming trip out of the country in the summer.  He is noticed a return in his knee pain about a month ago.  No new events or injuries.  Most of his pain is lateral in the knee joint.    ROS:  ROS negative except as listed in the HPI.    Allergies:   Allergies   Allergen Reactions   • Bacitracin-Polymyxin B Unknown - Low Severity   • Neomycin-Bacitracin Zn-Polymyx Unknown - Low Severity   • Iodine Rash   • Latex Rash       Medications:  Current Outpatient Medications on File Prior to Visit   Medication Sig   • amLODIPine (NORVASC) 2.5 MG tablet Take 2.5 mg by mouth Daily.   • aspirin 81 MG chewable tablet Chew 81 mg Daily.   • atorvastatin (LIPITOR) 10 MG tablet Take 10 mg by mouth Daily.   • buPROPion SR (WELLBUTRIN SR) 150 MG 12 hr tablet Take 150 mg by mouth.   • Cholecalciferol (VITAMIN D-3) 1000 UNITS capsule Take 2,000 Units by mouth Daily.   • clopidogrel (PLAVIX) 75 MG tablet Take 75 mg by mouth Daily.   • Coenzyme Q10 (CO Q 10) 10 MG capsule Take 1 tablet by mouth Daily.   • ezetimibe (ZETIA) 10 MG tablet Take 10 mg by mouth Daily.   • folic acid (FOLVITE) 1 MG tablet Take 1 mg by mouth Daily.   • glucosamine-chondroitin 500-400 MG capsule capsule Take 2 capsules by mouth Daily.   • Multiple Vitamins-Minerals (CENTRUM SILVER ADULT 50+ PO)  Take 1 tablet by mouth Daily.   • Omega-3 Fatty Acids (FISH OIL) 1000 MG capsule capsule Take 1,000 mg by mouth Daily With Breakfast.   • pantoprazole (PROTONIX) 40 MG EC tablet Take 40 mg by mouth Daily.   • Probiotic Product (PROBIOTIC DAILY PO) Take 1 capsule by mouth Daily.   • ramipril (ALTACE) 10 MG capsule Take 10 mg by mouth Daily.   • venlafaxine (EFFEXOR) 75 MG tablet Take 75 mg by mouth Daily.   • vitamin B-12 (CYANOCOBALAMIN) 100 MCG tablet Take 50 mcg by mouth Daily.   • vitamin B-6 (PYRIDOXINE) 50 MG tablet Take 50 mg by mouth Daily.   • HYDROcodone-acetaminophen (NORCO) 5-325 MG per tablet Take 1 tablet by mouth every 4 (Four) hours as needed for severe pain.   • nitroglycerin (NITROSTAT) 0.4 MG SL tablet Place 0.4 mg under the tongue Every 5 (Five) Minutes As Needed.   • PROAIR RESPICLICK 108 (90 BASE) MCG/ACT inhaler 1-2 inhalations every 6-8 hours as needed     No current facility-administered medications on file prior to visit.       Physical Exam:   76 y.o. male  Body mass index is 29.98 kg/m²., 92.1 kg (203 lb)  Vitals:    05/12/22 0954   Temp: 98 °F (36.7 °C)     General: Alert, cooperative, appears well and in no observable distress. Appears stated age and BMI as listed above.  Respiratory: Normal respiratory effort.  Skin: Warm & well perfused; appropriate skin turgor.  Psych: Appropriate mood & affect.    MSK Exam:  Left knee:  No deformity or wounds appreciated. No significant redness or warmth.  Trace effusion noted  Tenderness along the joint line appreciated lateral compartment  ROM 0-130 with pain at terminal motion. +crepitus  Ligamentous exam grossly stable  Quad strength 4-4+/5    Brief hip exam in the affected extremity(ies) grossly unremarkable.  Moves ankle and toes up and down, no significant pain or swelling in the foot, ankle or calf.        Radiology:    The following X-rays were ordered/reviewed today to evaluate the patient's symptoms: Single Knee: AP standing and sunrise  views of both knees, and lateral view of painful knee show Some mild degenerative changes but he does have reasonable joint space throughout the knee I compared these with films from 2019 then there is not really any significant appreciable change.    Procedure:   See Procedure Note: The potential risks and benefits of performing a diagnostic and therapeutic injection were discussed with the patient prior to procedure. Risks include, but are not limited to infection, swelling, transient increase in pain, bleeding, bruising. Patient was advised that injections are a diagnostic and therapeutic tool meaning they may not alleviate symptoms at all, or may only provide partial or temporary relief.       Misc. Data/Labs: N/A    Assessment & Plan:    This is a 76-year-old male with chronic left knee pain.  Symptoms are primarily degenerative in nature.  He is active, he works hard on strengthening and continues with cardiovascular exercises such as biking and walking.  He has intermittent flareups in his pain and I think another injection is reasonable to treat his symptoms.  He has not had one since November 2021.  Plan is to proceed with the injection again today which she tolerated well I will see him back as needed.  He can call anytime if there is any worsening of symptoms or new complaints.    Injection provided in the office today. Tolerated well with no immediate complications. Injection precautions discussed with the patient., Patient encouraged to call with questions or concerns prior to follow up.  and Patient instructed on use of ICE therapy PRN for pain and swelling.      ICD-10-CM ICD-9-CM   1. Chronic pain of left knee  M25.562 719.46    G89.29 338.29   2. Bilateral primary osteoarthritis of knee  M17.0 715.16     No orders of the defined types were placed in this encounter.    Orders Placed This Encounter   Procedures   • Large Joint Arthrocentesis: L knee   • XR Knee 3 View Left     Return if symptoms  worsen or fail to improve.    KARIS Sue    Large Joint Arthrocentesis: L knee  Date/Time: 5/12/2022 9:56 AM  Consent given by: patient  Site marked: site marked  Timeout: Immediately prior to procedure a time out was called to verify the correct patient, procedure, equipment, support staff and site/side marked as required   Supporting Documentation  Indications: pain   Procedure Details  Location: knee - L knee  Preparation: Patient was prepped and draped in the usual sterile fashion  Needle gauge: 21G.  Medications administered: 80 mg methylPREDNISolone acetate 80 MG/ML; 4 mL lidocaine PF 2% 2 %  Patient tolerance: patient tolerated the procedure well with no immediate complications          Dictated Utilizing Dragon Dictation

## 2022-09-08 ENCOUNTER — OFFICE VISIT (OUTPATIENT)
Dept: CARDIOLOGY | Facility: CLINIC | Age: 77
End: 2022-09-08

## 2022-09-08 VITALS
HEIGHT: 69 IN | BODY MASS INDEX: 31.16 KG/M2 | SYSTOLIC BLOOD PRESSURE: 152 MMHG | DIASTOLIC BLOOD PRESSURE: 80 MMHG | HEART RATE: 58 BPM | WEIGHT: 210.4 LBS

## 2022-09-08 DIAGNOSIS — I25.810 CORONARY ARTERY DISEASE INVOLVING CORONARY BYPASS GRAFT OF NATIVE HEART WITHOUT ANGINA PECTORIS: Primary | ICD-10-CM

## 2022-09-08 DIAGNOSIS — I65.21 CAROTID STENOSIS, ASYMPTOMATIC, RIGHT: ICD-10-CM

## 2022-09-08 PROCEDURE — 99204 OFFICE O/P NEW MOD 45 MIN: CPT | Performed by: INTERNAL MEDICINE

## 2022-09-08 RX ORDER — METOPROLOL SUCCINATE 25 MG/1
TABLET, EXTENDED RELEASE ORAL
COMMUNITY
Start: 2022-06-13

## 2022-09-08 NOTE — PROGRESS NOTES
[facility]      Patient Name: Jed Correia  :1945  Age: 77 y.o.  Encounter Provider:  Jed Moody Jr, MD      Chief Complaint: Initial evaluation of patient with coronary artery disease      HPI  Jed Correia is a 77 y.o. male past medical history of coronary artery disease status post PCI  and premature atrial contractions who presents for initial evaluation.  Patient previously followed by Dr. Flaco Stearns of the [facility].  Intervention in  with no subsequent needs for intervention.  He is a very active gentleman walks about 2 miles or rides the stationary bike for 9 to 10 miles daily.  No chest pain or shortness of air.  No orthopnea, PND or edema.  He has occasional palpitations and irregular heart rate has been a chronic issue for him.  Holter monitor in 2022 showed 15% PACs but no sustained arrhythmias.  He denies dizziness syncope.  He has a history of carotid stenosis with 50 to 69% on the left on ultrasound carotids .  No focal neurological deficits or other neurological complaints.  He is an ex-smoker quit , drinks socially and denies illicit drug use.  History of brother with myocardial infarction followed by CABG.      The following portions of the patient's history were reviewed and updated as appropriate: allergies, current medications, past family history, past medical history, past social history, past surgical history and problem list.      Review of Systems   Constitutional: Negative for chills and fever.   HENT: Negative for hoarse voice and sore throat.    Eyes: Negative for double vision and photophobia.   Cardiovascular: Positive for palpitations. Negative for chest pain, leg swelling, near-syncope, orthopnea, paroxysmal nocturnal dyspnea and syncope.   Respiratory: Negative for cough and wheezing.    Skin: Negative for poor wound healing and rash.   Musculoskeletal: Negative for arthritis and joint swelling.  "  Gastrointestinal: Negative for bloating, abdominal pain, hematemesis and hematochezia.   Neurological: Negative for dizziness and focal weakness.   Psychiatric/Behavioral: Negative for depression and suicidal ideas.       OBJECTIVE:   Vital Signs  Vitals:    09/08/22 1407   BP: 152/80   Pulse: 58     Estimated body mass index is 31.07 kg/m² as calculated from the following:    Height as of this encounter: 175.3 cm (69\").    Weight as of this encounter: 95.4 kg (210 lb 6.4 oz).    Vitals reviewed.   Constitutional:       Appearance: Healthy appearance. Not in distress.   Neck:      Vascular: No JVR. JVD normal.   Pulmonary:      Effort: Pulmonary effort is normal.      Breath sounds: Normal breath sounds. No wheezing. No rhonchi. No rales.   Chest:      Chest wall: Not tender to palpatation.   Cardiovascular:      PMI at left midclavicular line. Normal rate. Regular rhythm. Normal S1. Normal S2.      Murmurs: There is no murmur.      No gallop. No click. No rub.   Pulses:     Intact distal pulses.   Edema:     Peripheral edema absent.   Abdominal:      General: Bowel sounds are normal.      Palpations: Abdomen is soft.      Tenderness: There is no abdominal tenderness.   Musculoskeletal: Normal range of motion.         General: No tenderness. Skin:     General: Skin is warm and dry.   Neurological:      General: No focal deficit present.      Mental Status: Alert and oriented to person, place and time.           ECG 12 Lead    Date/Time: 9/10/2022 12:14 PM  Performed by: Jed Moody Jr., MD  Authorized by: Jed Moody Jr., MD   Comparison: not compared with previous ECG   Previous ECG: no previous ECG available  Rhythm: sinus rhythm  Ectopy: atrial premature contractions  Other findings: non-specific ST-T wave changes    Clinical impression: non-specific ECG                  ASSESSMENT:     CAD without angina  Carotid stenosis asymptomatic  Premature atrial contractions  Hypertension  Dyslipidemia    PLAN OF " CARE:     1. CAD without angina -currently on dual antiplatelet therapy, statin.  Tolerating medical therapy well.  Good functional capacity with no angina.  Continue to monitor closely.  He has labs coming up with PCP and we will follow lipid profile.  2. Carotid stenosis asymptomatic -repeat lipids.  Will benefit from referral to vascular surgery if still moderate or greater severity.  3. Premature atrial contractions -minimal impact on quality of life.  No sustained arrhythmias on recent Holter.  Monitor clinical progress.  4. Hypertension -elevated today in clinic.  Sodium restricted diet.  Twice daily blood pressure log to be sent in after 1 week.  5. Dyslipidemia -as above    Return to clinic 6 months             Discharge Medications          Accurate as of September 8, 2022  2:13 PM. If you have any questions, ask your nurse or doctor.            Continue These Medications      Instructions Start Date   amLODIPine 2.5 MG tablet  Commonly known as: NORVASC   2.5 mg, Oral, Daily      aspirin 81 MG chewable tablet   81 mg, Oral, Daily      atorvastatin 10 MG tablet  Commonly known as: LIPITOR   10 mg, Oral, Daily      buPROPion  MG 12 hr tablet  Commonly known as: WELLBUTRIN SR   150 mg, Oral      clopidogrel 75 MG tablet  Commonly known as: PLAVIX   75 mg, Oral, Daily      Co Q 10 10 MG capsule   1 tablet, Oral, Daily      ezetimibe 10 MG tablet  Commonly known as: ZETIA   10 mg, Oral, Daily      fish oil 1000 MG capsule capsule   1,000 mg, Oral, Daily With Breakfast      folic acid 1 MG tablet  Commonly known as: FOLVITE   1 mg, Oral, Daily      glucosamine-chondroitin 500-400 MG capsule capsule   2 capsules, Oral, Daily      HYDROcodone-acetaminophen 5-325 MG per tablet  Commonly known as: NORCO   Take 1 tablet by mouth every 4 (Four) hours as needed for severe pain.      metoprolol succinate XL 25 MG 24 hr tablet  Commonly known as: TOPROL-XL   TAKE 0.5 TABLET BY MOUTH DAILY. HOLD FOR HEART RATE LESS  THAN 60 BPM      multivitamin with minerals tablet tablet   1 tablet, Oral, Daily      nitroglycerin 0.4 MG SL tablet  Commonly known as: NITROSTAT   0.4 mg, Sublingual, Every 5 Minutes PRN      pantoprazole 40 MG EC tablet  Commonly known as: PROTONIX   40 mg, Oral, Daily      ProAir RespiClick 108 (90 Base) MCG/ACT inhaler  Generic drug: albuterol   1-2 inhalations every 6-8 hours as needed      PROBIOTIC DAILY PO   1 capsule, Oral, Daily      ramipril 10 MG capsule  Commonly known as: ALTACE   10 mg, Oral, Daily      venlafaxine 75 MG tablet  Commonly known as: EFFEXOR   75 mg, Oral, Daily      vitamin B-12 100 MCG tablet  Commonly known as: CYANOCOBALAMIN   50 mcg, Oral, Daily      vitamin B-6 50 MG tablet  Commonly known as: PYRIDOXINE   50 mg, Oral, Daily      Vitamin D-3 25 MCG (1000 UT) capsule   2,000 Units, Oral, Daily             Thank you for allowing me to participate in the care of your patient,      Sincerely,   Jde Moody MD  [facility]  09/08/22  14:13 EDT

## 2022-09-10 PROCEDURE — 93000 ELECTROCARDIOGRAM COMPLETE: CPT | Performed by: INTERNAL MEDICINE

## 2022-10-03 ENCOUNTER — HOSPITAL ENCOUNTER (OUTPATIENT)
Dept: CARDIOLOGY | Facility: HOSPITAL | Age: 77
Discharge: HOME | End: 2022-10-03
Admitting: INTERNAL MEDICINE

## 2022-10-03 DIAGNOSIS — I65.21 CAROTID STENOSIS, ASYMPTOMATIC, RIGHT: ICD-10-CM

## 2022-10-03 PROCEDURE — 93880 EXTRACRANIAL BILAT STUDY: CPT | Performed by: INTERNAL MEDICINE

## 2022-10-03 PROCEDURE — 93880 EXTRACRANIAL BILAT STUDY: CPT

## 2022-10-04 ENCOUNTER — TELEPHONE (OUTPATIENT)
Dept: CARDIOLOGY | Facility: CLINIC | Age: 77
End: 2022-10-04

## 2022-10-04 LAB
BH CV XLRA MEAS LEFT DIST CCA EDV: -12.2 CM/SEC
BH CV XLRA MEAS LEFT DIST CCA PSV: -76.8 CM/SEC
BH CV XLRA MEAS LEFT DIST ICA EDV: -22.6 CM/SEC
BH CV XLRA MEAS LEFT DIST ICA PSV: -79 CM/SEC
BH CV XLRA MEAS LEFT ICA/CCA RATIO: 1
BH CV XLRA MEAS LEFT MID CCA EDV: 13 CM/SEC
BH CV XLRA MEAS LEFT MID CCA PSV: 77.3 CM/SEC
BH CV XLRA MEAS LEFT MID ICA EDV: -15.3 CM/SEC
BH CV XLRA MEAS LEFT MID ICA PSV: -62.4 CM/SEC
BH CV XLRA MEAS LEFT PROX CCA EDV: 12.9 CM/SEC
BH CV XLRA MEAS LEFT PROX CCA PSV: 63.9 CM/SEC
BH CV XLRA MEAS LEFT PROX ECA PSV: -81.2 CM/SEC
BH CV XLRA MEAS LEFT PROX ICA EDV: -22.4 CM/SEC
BH CV XLRA MEAS LEFT PROX ICA PSV: -78.1 CM/SEC
BH CV XLRA MEAS LEFT PROX SCLA PSV: 154.7 CM/SEC
BH CV XLRA MEAS LEFT VERTEBRAL A PSV: -47.4 CM/SEC
BH CV XLRA MEAS RIGHT DIST CCA EDV: 13.7 CM/SEC
BH CV XLRA MEAS RIGHT DIST CCA PSV: 57.1 CM/SEC
BH CV XLRA MEAS RIGHT DIST ICA EDV: -18.9 CM/SEC
BH CV XLRA MEAS RIGHT DIST ICA PSV: -97.1 CM/SEC
BH CV XLRA MEAS RIGHT ICA/CCA RATIO: 0.99
BH CV XLRA MEAS RIGHT MID CCA EDV: 18 CM/SEC
BH CV XLRA MEAS RIGHT MID CCA PSV: 85.7 CM/SEC
BH CV XLRA MEAS RIGHT MID ICA EDV: -23.6 CM/SEC
BH CV XLRA MEAS RIGHT MID ICA PSV: -108.5 CM/SEC
BH CV XLRA MEAS RIGHT PROX CCA EDV: 8.7 CM/SEC
BH CV XLRA MEAS RIGHT PROX CCA PSV: 111.8 CM/SEC
BH CV XLRA MEAS RIGHT PROX ECA PSV: -114.6 CM/SEC
BH CV XLRA MEAS RIGHT PROX ICA EDV: -22.2 CM/SEC
BH CV XLRA MEAS RIGHT PROX ICA PSV: -110.5 CM/SEC
BH CV XLRA MEAS RIGHT PROX SCLA PSV: 185.6 CM/SEC
BH CV XLRA MEAS RIGHT VERTEBRAL A PSV: -40.8 CM/SEC
MAXIMAL PREDICTED HEART RATE: 142 BPM
STRESS TARGET HR: 121 BPM

## 2022-10-04 NOTE — TELEPHONE ENCOUNTER
Spoke to wife Adia.  Moderate stenosis on the right with mild stenosis on the left.  We will perform yearly surveillance.

## 2022-11-28 ENCOUNTER — TELEPHONE (OUTPATIENT)
Dept: ORTHOPEDICS | Facility: OTHER | Age: 77
End: 2022-11-28

## 2022-11-28 NOTE — TELEPHONE ENCOUNTER
Caller: ALEX HUANG    Relationship to patient: SELF    Best call back number: [phone]    Chief complaint: LEFT KNEE INJECTION     Type of visit: LEFT KNEE INJECTION    Requested date: MORNING     If rescheduling, when is the original appointment: NA     Additional notes: NA

## 2022-12-01 NOTE — PROGRESS NOTES
[facility]  Follow Up Visit      Patient Name: Jed Correia  : 1945  Primary Care Physician: Myke Landaverde MD        Chief Complaint: Left knee pain     HPI:   Jed Correia is a 77 y.o. year old who presents today for left knee pain. Patient has a history of chronic left knee pain, he has had an MRI in the past about a year ago which showed evidence of prior meniscus surgery on the medial side, degenerative changes to the lateral compartment and some arthritis medial. I last saw him back in May, he reports today a recurrence of his knee pain after going up and down a bunch of stairs. Pain is mostly anterior, no radiation. He has done well with intermittent injections in the past and is here for another injection of the L knee today.      ROS:  ROS negative except as listed in the HPI.    Allergies:   Allergies   Allergen Reactions   • Bacitracin-Polymyxin B Unknown - Low Severity   • Neomycin-Bacitracin Zn-Polymyx Unknown - Low Severity   • Iodine Rash   • Latex Rash       Medications:  Current Outpatient Medications on File Prior to Visit   Medication Sig   • aspirin 81 MG chewable tablet Chew 81 mg Daily.   • atorvastatin (LIPITOR) 10 MG tablet Take 10 mg by mouth Daily.   • Cholecalciferol (VITAMIN D-3) 1000 UNITS capsule Take 2,000 Units by mouth Daily.   • clopidogrel (PLAVIX) 75 MG tablet Take 75 mg by mouth Daily.   • Coenzyme Q10 (CO Q 10) 10 MG capsule Take 1 tablet by mouth Daily.   • ezetimibe (ZETIA) 10 MG tablet Take 10 mg by mouth Daily.   • folic acid (FOLVITE) 1 MG tablet Take 1 mg by mouth Daily.   • glucosamine-chondroitin 500-400 MG capsule capsule Take 2 capsules by mouth Daily.   • metoprolol succinate XL (TOPROL-XL) 25 MG 24 hr tablet TAKE 0.5 TABLET BY MOUTH DAILY. HOLD FOR HEART RATE LESS THAN 60 BPM   • Multiple Vitamins-Minerals (CENTRUM SILVER ADULT 50+ PO) Take 1 tablet by mouth Daily.   • nitroglycerin (NITROSTAT) 0.4 MG SL tablet Place 0.4 mg under the tongue Every  5 (Five) Minutes As Needed.   • Omega-3 Fatty Acids (FISH OIL) 1000 MG capsule capsule Take 1,000 mg by mouth Daily With Breakfast.   • pantoprazole (PROTONIX) 40 MG EC tablet Take 40 mg by mouth Daily.   • Probiotic Product (PROBIOTIC DAILY PO) Take 1 capsule by mouth Daily.   • ramipril (ALTACE) 10 MG capsule Take 10 mg by mouth Daily.   • venlafaxine (EFFEXOR) 75 MG tablet Take 75 mg by mouth Daily.   • vitamin B-12 (CYANOCOBALAMIN) 100 MCG tablet Take 50 mcg by mouth Daily.   • vitamin B-6 (PYRIDOXINE) 50 MG tablet Take 50 mg by mouth Daily.   • amLODIPine (NORVASC) 2.5 MG tablet Take 2.5 mg by mouth Daily.   • buPROPion SR (WELLBUTRIN SR) 150 MG 12 hr tablet Take 150 mg by mouth.   • HYDROcodone-acetaminophen (NORCO) 5-325 MG per tablet Take 1 tablet by mouth every 4 (Four) hours as needed for severe pain.   • PROAIR RESPICLICK 108 (90 BASE) MCG/ACT inhaler 1-2 inhalations every 6-8 hours as needed     No current facility-administered medications on file prior to visit.       Physical Exam:   77 y.o. male  Body mass index is 31.31 kg/m²., 96.2 kg (212 lb)  Vitals:    12/02/22 0927   Temp: 97.5 °F (36.4 °C)     General: Alert, cooperative, appears well and in no observable distress. Appears stated age and BMI as listed above.  Respiratory: Normal respiratory effort.  Skin: Warm & well perfused; appropriate skin turgor.  Psych: Appropriate mood & affect.    MSK Exam:  Left Knee  No deformity or wounds appreciated. No significant redness or warmth.  Trace effusion noted  Tenderness along the joint line appreciated patellofemoral compartment  ROM 0-120 with pain at terminal motion.   Ligamentous exam grossly stable       Radiology:    No new images were needed at the visit today.     Procedure:   See Procedure Note: The potential risks and benefits of performing a diagnostic and therapeutic injection were discussed with the patient prior to procedure. Risks include, but are not limited to infection, swelling,  transient increase in pain, bleeding, bruising. Patient was advised that injections are a diagnostic and therapeutic tool meaning they may not alleviate symptoms at all, or may only provide partial or temporary relief. Injection precautions and aftercare discussed.    Large Joint Arthrocentesis: L knee  Date/Time: 12/2/2022 9:26 AM  Consent given by: patient  Site marked: site marked  Timeout: Immediately prior to procedure a time out was called to verify the correct patient, procedure, equipment, support staff and site/side marked as required   Supporting Documentation  Indications: pain   Procedure Details  Location: knee - L knee  Preparation: Patient was prepped and draped in the usual sterile fashion  Needle gauge: 21G.  Medications administered: 80 mg methylPREDNISolone acetate 80 MG/ML; 2 mL lidocaine (cardiac)  Patient tolerance: patient tolerated the procedure well with no immediate complications          Misc. Data/Labs: N/A    Assessment & Plan:    ICD-10-CM ICD-9-CM   1. Chronic pain of left knee  M25.562 719.46    G89.29 338.29   2. Bilateral primary osteoarthritis of knee  M17.0 715.16     No orders of the defined types were placed in this encounter.    Orders Placed This Encounter   Procedures   • Large Joint Arthrocentesis: L knee       76 y/o male with chronic intermittent L knee pain. He has history of meniscus surgery, known degenerative changes and does well with conservative care. Plan is to proceed with another injection again today which he tolerated well. I'll see him back as needed.     Return if symptoms worsen or fail to improve.    Patient encouraged to call with questions or concerns prior to follow up.  Recommend ICE and/or HEAT PRN as discussed.  Will discuss with attending physician as needed.  Consider additional referrals, work up and/or advanced imaging as indicated or if patient fails to respond to conservative care.        Tylor Lynch, APRN

## 2022-12-02 ENCOUNTER — CLINICAL SUPPORT (OUTPATIENT)
Dept: ORTHOPEDIC SURGERY | Facility: CLINIC | Age: 77
End: 2022-12-02

## 2022-12-02 VITALS — HEIGHT: 69 IN | TEMPERATURE: 97.5 F | WEIGHT: 212 LBS | BODY MASS INDEX: 31.4 KG/M2

## 2022-12-02 DIAGNOSIS — M25.562 CHRONIC PAIN OF LEFT KNEE: Primary | ICD-10-CM

## 2022-12-02 DIAGNOSIS — G89.29 CHRONIC PAIN OF LEFT KNEE: Primary | ICD-10-CM

## 2022-12-02 DIAGNOSIS — M17.0 BILATERAL PRIMARY OSTEOARTHRITIS OF KNEE: ICD-10-CM

## 2022-12-02 PROCEDURE — 20610 DRAIN/INJ JOINT/BURSA W/O US: CPT | Performed by: NURSE PRACTITIONER

## 2022-12-02 RX ORDER — METHYLPREDNISOLONE ACETATE 80 MG/ML
80 INJECTION, SUSPENSION INTRA-ARTICULAR; INTRALESIONAL; INTRAMUSCULAR; SOFT TISSUE
Status: COMPLETED | OUTPATIENT
Start: 2022-12-02 | End: 2022-12-02

## 2022-12-02 RX ADMIN — METHYLPREDNISOLONE ACETATE 80 MG: 80 INJECTION, SUSPENSION INTRA-ARTICULAR; INTRALESIONAL; INTRAMUSCULAR; SOFT TISSUE at 09:26

## 2023-02-21 ENCOUNTER — TELEPHONE (OUTPATIENT)
Dept: CARDIOLOGY | Facility: CLINIC | Age: 78
End: 2023-02-21
Payer: MEDICARE

## 2023-02-21 NOTE — TELEPHONE ENCOUNTER
He should hold both atorvastatin and Plavix while taking Paxlovid. He should resume both the day after completing Paxlovid.

## 2023-02-21 NOTE — TELEPHONE ENCOUNTER
Pt called.    He was just diagnosed with COVID and started on Paxlovid.     The pharmacist told him to stop his atorvastatin. But they also wanted him to contact us about holding his clopidogrel as well.    He can be reached at #[phone].  Please advise.    Thanks,  Scarlett

## 2023-02-21 NOTE — TELEPHONE ENCOUNTER
Called pt and advised of his instructions. I told him to call back with any questions.    Scarlett

## 2023-03-06 ENCOUNTER — TELEPHONE (OUTPATIENT)
Dept: GASTROENTEROLOGY | Facility: CLINIC | Age: 78
End: 2023-03-06
Payer: MEDICARE

## 2023-03-17 ENCOUNTER — PREP FOR SURGERY (OUTPATIENT)
Dept: SURGERY | Facility: SURGERY CENTER | Age: 78
End: 2023-03-17
Payer: MEDICARE

## 2023-03-17 DIAGNOSIS — Z12.11 ENCOUNTER FOR SCREENING FOR MALIGNANT NEOPLASM OF COLON: Primary | ICD-10-CM

## 2023-03-19 RX ORDER — SODIUM CHLORIDE, SODIUM LACTATE, POTASSIUM CHLORIDE, CALCIUM CHLORIDE 600; 310; 30; 20 MG/100ML; MG/100ML; MG/100ML; MG/100ML
30 INJECTION, SOLUTION INTRAVENOUS CONTINUOUS PRN
OUTPATIENT
Start: 2023-03-19

## 2023-03-20 ENCOUNTER — PREP FOR SURGERY (OUTPATIENT)
Dept: SURGERY | Facility: SURGERY CENTER | Age: 78
End: 2023-03-20
Payer: MEDICARE

## 2023-03-20 DIAGNOSIS — Z12.11 ENCOUNTER FOR SCREENING FOR MALIGNANT NEOPLASM OF COLON: Primary | ICD-10-CM

## 2023-03-22 ENCOUNTER — OFFICE VISIT (OUTPATIENT)
Dept: CARDIOLOGY | Facility: CLINIC | Age: 78
End: 2023-03-22
Payer: MEDICARE

## 2023-03-22 VITALS
BODY MASS INDEX: 30.96 KG/M2 | DIASTOLIC BLOOD PRESSURE: 80 MMHG | SYSTOLIC BLOOD PRESSURE: 161 MMHG | HEIGHT: 69 IN | WEIGHT: 209 LBS | OXYGEN SATURATION: 98 % | HEART RATE: 62 BPM

## 2023-03-22 DIAGNOSIS — I10 PRIMARY HYPERTENSION: Primary | ICD-10-CM

## 2023-03-22 DIAGNOSIS — I25.10 CORONARY ARTERY DISEASE INVOLVING NATIVE CORONARY ARTERY OF NATIVE HEART WITHOUT ANGINA PECTORIS: ICD-10-CM

## 2023-03-22 DIAGNOSIS — E78.2 MIXED HYPERLIPIDEMIA: ICD-10-CM

## 2023-03-22 PROCEDURE — 1160F RVW MEDS BY RX/DR IN RCRD: CPT | Performed by: NURSE PRACTITIONER

## 2023-03-22 PROCEDURE — 93000 ELECTROCARDIOGRAM COMPLETE: CPT | Performed by: NURSE PRACTITIONER

## 2023-03-22 PROCEDURE — 99214 OFFICE O/P EST MOD 30 MIN: CPT | Performed by: NURSE PRACTITIONER

## 2023-03-22 PROCEDURE — 1159F MED LIST DOCD IN RCRD: CPT | Performed by: NURSE PRACTITIONER

## 2023-03-22 NOTE — PROGRESS NOTES
Subjective:     Encounter Date:03/22/2023      Patient ID: Jed Correia is a 77 y.o. male.    Chief Complaint:follow up CAD  History of Present Illness  This is a 76 y/o man who follows with Dr. Moody and is new to me today. He has a pmhx of coronary artery disease s/p PCI in 2013. He previously followed with [facility]. He wore a Holter monitor in March 2022 that showed 15% burden of PACs but no sustained arrhythmias. He also has a history of carotid stenosis with 50-69% on the left per ultrasound of carotids in 2020. He is an ex smoker who quit in 1978.    He saw Dr. Moody in the office in September 2022 for his initial visit and was doing well. He is here today for a 6 month follow up. He had COVID about a month ago and was treated with Paxlovid. He feels he is recovering from this but still has some lingering fatigue. From a cardiac standpoint, he is feeling good. He denies any chest pain, shortness of breath, palpitations, dizziness or syncope. He denies any swelling in his lower extremities, orthopnea or PND. His blood pressure was elevated on arrival to the appointment today. On my recheck, it was 142/80.    I have reviewed and updated as appropriate allergies, current medications, past family history, past medical history, past surgical history and problem list.    Review of Systems   Constitutional: Positive for malaise/fatigue. Negative for fever, weight gain and weight loss.   HENT: Negative for congestion, hoarse voice and sore throat.    Eyes: Negative for blurred vision and double vision.   Cardiovascular: Negative for chest pain, dyspnea on exertion, leg swelling, orthopnea, palpitations and syncope.   Respiratory: Negative for cough, shortness of breath and wheezing.    Gastrointestinal: Negative for abdominal pain, hematemesis, hematochezia and melena.   Genitourinary: Negative for dysuria and hematuria.   Neurological: Negative for dizziness, headaches, light-headedness and  numbness.   Psychiatric/Behavioral: Negative for depression. The patient is not nervous/anxious.          Current Outpatient Medications:   •  aspirin 81 MG chewable tablet, Chew 1 tablet Daily., Disp: , Rfl:   •  atorvastatin (LIPITOR) 10 MG tablet, Take 1 tablet by mouth Daily., Disp: , Rfl:   •  Cholecalciferol (VITAMIN D-3) 1000 UNITS capsule, Take 2,000 Units by mouth Daily., Disp: , Rfl:   •  clopidogrel (PLAVIX) 75 MG tablet, Take 1 tablet by mouth Daily., Disp: , Rfl:   •  Coenzyme Q10 (CO Q 10) 10 MG capsule, Take 1 tablet by mouth Daily., Disp: , Rfl:   •  desonide (DESOWEN) 0.05 % lotion, Apply 1 application topically to the appropriate area as directed 2 (Two) Times a Day., Disp: , Rfl:   •  ezetimibe (ZETIA) 10 MG tablet, Take 1 tablet by mouth Daily., Disp: , Rfl:   •  folic acid (FOLVITE) 1 MG tablet, Take 1 tablet by mouth Daily., Disp: , Rfl:   •  glucosamine-chondroitin 500-400 MG capsule capsule, Take 2 capsules by mouth Daily., Disp: , Rfl:   •  metoprolol succinate XL (TOPROL-XL) 25 MG 24 hr tablet, TAKE 0.5 TABLET BY MOUTH DAILY. HOLD FOR HEART RATE LESS THAN 60 BPM, Disp: , Rfl:   •  Multiple Vitamins-Minerals (CENTRUM SILVER ADULT 50+ PO), Take 1 tablet by mouth Daily., Disp: , Rfl:   •  nitroglycerin (NITROSTAT) 0.4 MG SL tablet, Place 1 tablet under the tongue Every 5 (Five) Minutes As Needed., Disp: , Rfl: 1  •  Omega-3 Fatty Acids (FISH OIL) 1000 MG capsule capsule, Take 1 capsule by mouth Daily With Breakfast., Disp: , Rfl:   •  pantoprazole (PROTONIX) 40 MG EC tablet, Take 1 tablet by mouth Daily., Disp: , Rfl:   •  Probiotic Product (PROBIOTIC DAILY PO), Take 1 capsule by mouth Daily., Disp: , Rfl:   •  ramipril (ALTACE) 10 MG capsule, Take 1 capsule by mouth Daily., Disp: , Rfl:   •  venlafaxine (EFFEXOR) 75 MG tablet, Take 1 tablet by mouth Daily., Disp: , Rfl:   •  vitamin B-12 (CYANOCOBALAMIN) 100 MCG tablet, Take 50 mcg by mouth Daily., Disp: , Rfl:   •  vitamin B-6 (PYRIDOXINE)  50 MG tablet, Take 1 tablet by mouth Daily., Disp: , Rfl:     Past Medical History:   Diagnosis Date   • Arthritis    • Cancer (HCC)     COLON, PROSTATE, SKIN   • H/O arthroscopy of left knee    • H/O arthroscopy of right knee    • Hyperlipidemia    • Hypertension    • Positional sleep apnea 2019    Home sleep study.  AHI normal at 2/h for total monitoring time.  When supine, mildly abnormal at 6.4 events per hour.  No sleep-related hypoxia.       Past Surgical History:   Procedure Laterality Date   • CARDIAC SURGERY      STENT   • COLON SURGERY      COLON RESECTION, COLON CANCER REMOVAL   • COLONOSCOPY N/A 2018    Procedure: COLONOSCOPY TO CECUM/TI WITH POLYPECTOMY ( COLD BX);  Surgeon: Moise Garcia MD;  Location: [facility] ENDOSCOPY;  Service: Gastroenterology   • EYE SURGERY      CATARACTS, MACULAR HOLE REPAIR   • KNEE ARTHROSCOPY Right 2020    Procedure: KNEE ARTHROSCOPY, PARTIAL MEDIAL AND LATERAL MENISECTOMY, AND DEBRIDEMENT OF ARTHRITIS;  Surgeon: Mercedes Falk MD;  Location: [facility] OR [facility] [place];  Service: Orthopedics   • PROSTATE SURGERY      CANCER   • SKIN BIOPSY      5X-BASAL CELL CARCINOMA       Family History   Problem Relation Age of Onset   • Cancer Sister    • Cancer Brother    • Alcohol abuse Brother    • Hypertension Brother    • ALS Brother    • Malig Hyperthermia Neg Hx        Social History     Tobacco Use   • Smoking status: Former     Packs/day: 0.50     Years: 15.00     Pack years: 7.50     Types: Cigarettes     Quit date:      Years since quittin.2   • Smokeless tobacco: Never   • Tobacco comments:     QUIT 35 YEARS AGO   Vaping Use   • Vaping Use: Never used   Substance Use Topics   • Alcohol use: Yes     Comment: occasionnally;  caffeine use- coffee   • Drug use: No         ECG 12 Lead    Date/Time: 3/22/2023 1:41 PM  Performed by: Amalia Tomlinson APRN  Authorized by: Amalia Tomlinson APRN   Comparison: compared with previous ECG from 2022  Similar to  "previous ECG  Rhythm: sinus rhythm  Ectopy: atrial premature contractions  Comments: No significant change from previous EKG               Objective:     Visit Vitals  /80   Pulse 62   Ht 175.3 cm (69\")   Wt 94.8 kg (209 lb)   SpO2 98%   BMI 30.86 kg/m²             Physical Exam  Constitutional:       Appearance: Normal appearance. He is normal weight.   HENT:      Head: Normocephalic.   Neck:      Vascular: No carotid bruit.   Cardiovascular:      Rate and Rhythm: Normal rate and regular rhythm. Occasional extrasystoles are present.     Chest Wall: PMI is not displaced.      Pulses: Normal pulses.           Radial pulses are 2+ on the right side and 2+ on the left side.        Posterior tibial pulses are 2+ on the right side and 2+ on the left side.      Heart sounds: Normal heart sounds. No murmur heard.    No friction rub. No gallop.   Pulmonary:      Effort: Pulmonary effort is normal.      Breath sounds: Normal breath sounds.   Abdominal:      General: Bowel sounds are normal. There is no distension.      Palpations: Abdomen is soft.   Musculoskeletal:      Right lower leg: No edema.      Left lower leg: No edema.   Skin:     General: Skin is warm and dry.      Capillary Refill: Capillary refill takes less than 2 seconds.   Neurological:      Mental Status: He is alert and oriented to person, place, and time.   Psychiatric:         Mood and Affect: Mood normal.         Behavior: Behavior normal.         Thought Content: Thought content normal.          Lab Review:   Lipid Panel    Lipid Panel 1/31/23   Total Cholesterol 133   Triglycerides 117   HDL Cholesterol 42 (A)   LDL/HDL Ratio 2   (A) Abnormal value       Comments are available for some flowsheets but are not being displayed.               Cardiac Procedures:   1.     Assessment:         Diagnoses and all orders for this visit:    1. Primary hypertension (Primary)    2. Coronary artery disease involving native coronary artery of native heart " without angina pectoris    3. Mixed hyperlipidemia            Plan:       1. CAD: On DAPT with aspirin and clopidogrel, statin. EKG is stable with no acute ischemic changes. No anginal symptoms. Will continue with current medical management.  2. Carotid stenosis: asymptomatic. Continue aspirin and statin  3. PACs: noted on EKG today and again asymptomatic.   4. HTN: BP was elevated today. Asked to monitor at home and contact me if BP is remaining above 130/80. I will call next week to see how his BP is doing.  5. HLD: on statin therapy. Goal LDL < 70. LDL at goal on lipid panel in January 2023.    Thank you for allowing me to participate in this patient's care. Please call with any questions or concerns. Mr. Correia will follow up with Dr. Moody in 6 months.          Your medication list          Accurate as of March 22, 2023  1:12 PM. If you have any questions, ask your nurse or doctor.            CONTINUE taking these medications      Instructions Last Dose Given Next Dose Due   aspirin 81 MG chewable tablet      Chew 1 tablet Daily.       atorvastatin 10 MG tablet  Commonly known as: LIPITOR      Take 1 tablet by mouth Daily.       clopidogrel 75 MG tablet  Commonly known as: PLAVIX      Take 1 tablet by mouth Daily.       Co Q 10 10 MG capsule      Take 1 tablet by mouth Daily.       desonide 0.05 % lotion  Commonly known as: DESOWEN      Apply 1 application topically to the appropriate area as directed 2 (Two) Times a Day.       ezetimibe 10 MG tablet  Commonly known as: ZETIA      Take 1 tablet by mouth Daily.       fish oil 1000 MG capsule capsule      Take 1 capsule by mouth Daily With Breakfast.       folic acid 1 MG tablet  Commonly known as: FOLVITE      Take 1 tablet by mouth Daily.       glucosamine-chondroitin 500-400 MG capsule capsule      Take 2 capsules by mouth Daily.       metoprolol succinate XL 25 MG 24 hr tablet  Commonly known as: TOPROL-XL      TAKE 0.5 TABLET BY MOUTH DAILY. HOLD FOR HEART  RATE LESS THAN 60 BPM       multivitamin with minerals tablet tablet      Take 1 tablet by mouth Daily.       nitroglycerin 0.4 MG SL tablet  Commonly known as: NITROSTAT      Place 1 tablet under the tongue Every 5 (Five) Minutes As Needed.       pantoprazole 40 MG EC tablet  Commonly known as: PROTONIX      Take 1 tablet by mouth Daily.       PROBIOTIC DAILY PO      Take 1 capsule by mouth Daily.       ramipril 10 MG capsule  Commonly known as: ALTACE      Take 1 capsule by mouth Daily.       venlafaxine 75 MG tablet  Commonly known as: EFFEXOR      Take 1 tablet by mouth Daily.       vitamin B-12 100 MCG tablet  Commonly known as: CYANOCOBALAMIN      Take 50 mcg by mouth Daily.       vitamin B-6 50 MG tablet  Commonly known as: PYRIDOXINE      Take 1 tablet by mouth Daily.       Vitamin D-3 25 MCG (1000 UT) capsule      Take 2,000 Units by mouth Daily.                Amalia Tomlinson, KARIS  03/22/23  1:12 PM EDT

## 2023-03-28 PROBLEM — Z12.11 ENCOUNTER FOR SCREENING FOR MALIGNANT NEOPLASM OF COLON: Status: ACTIVE | Noted: 2023-03-28

## 2023-04-05 NOTE — PROGRESS NOTES
[facility]  New Problem      Patient Name: Jed Correia  : 1945  Primary Care Physician: Myke Landaverde MD        Chief Complaint: Right upper extremity pain, fall    Answers for HPI/ROS submitted by the patient on 3/31/2023  What is the primary reason for your visit?: Other  Please describe your symptoms.: After a fall on 3/23/2023 I’ve had shoulder and arm pain. I visited a [facility] facility on two occasions. Last visit Dr’s diagnosis was neck nerve impingment.  Have you had these symptoms before?: No  How long have you been having these symptoms?: 1-2 weeks  Please list any medications you are currently taking for this condition.: I’m on a 6day regimen on methylprednisolone.  Please describe any probable cause for these symptoms. : A fall.    HPI:   Jed Correia is a 77 y.o. year old who presents today for evaluation of right upper extremity pain.  Patient reports a traumatic injury when he fell walking down concrete stairs.  He says he fell onto his right side, does have some pain in the knee and hip which is mild overall compared to his right upper extremity pain.  He has significant pain in the neck area, shoulder radiating down into the arm and hand with numbness and tingling.  He went to urgent care initially x-rays were obtained did not demonstrate any suggestion of fracture, initially treating him for shoulder injury.  He then developed increasing pain as well as the numbness and tingling which prompted him to return to the urgent care for reevaluation.  At that time a steroid Dosepak was prescribed for suspected cervical spine pathology with nerve impingement.  Patient says he is really not sure if the steroid helped him at all but thinks perhaps his pain could have been worse without it.  Despite the steroid which is now been completed he has persistent numbness and tingling particularly first second third digits pain rating up the arm into the traps and neck.  Pain is  worse with certain shoulder motions and neck flexion extension and rotation.  Patient has tried Tylenol, Salonpas.  He tried ice which made the symptoms worse.  He is here today for further evaluation and treatment.    Past Medical/Surgical, Social and Family History:  I have reviewed and/or updated pertinent history as noted in the medical record including:  Past Medical History:   Diagnosis Date   • Arthritis    • Arthritis of neck    • Cancer     COLON, PROSTATE, SKIN   • Cervical disc disorder    • H/O arthroscopy of left knee    • H/O arthroscopy of right knee    • Hyperlipidemia    • Hypertension    • Positional sleep apnea 2019    Home sleep study.  AHI normal at 2/h for total monitoring time.  When supine, mildly abnormal at 6.4 events per hour.  No sleep-related hypoxia.   • Tear of meniscus of knee    • Thoracic disc disorder      Past Surgical History:   Procedure Laterality Date   • CARDIAC SURGERY      STENT   • COLON SURGERY      COLON RESECTION, COLON CANCER REMOVAL   • COLONOSCOPY N/A 2018    Procedure: COLONOSCOPY TO CECUM/TI WITH POLYPECTOMY ( COLD BX);  Surgeon: Moise Garcia MD;  Location: [facility] ENDOSCOPY;  Service: Gastroenterology   • EYE SURGERY      CATARACTS, MACULAR HOLE REPAIR   • KNEE ARTHROSCOPY Right 2020    Procedure: KNEE ARTHROSCOPY, PARTIAL MEDIAL AND LATERAL MENISECTOMY, AND DEBRIDEMENT OF ARTHRITIS;  Surgeon: Mercedes Falk MD;  Location: [facility] OR [facility];  Service: Orthopedics   • KNEE SURGERY     • PROSTATE SURGERY      CANCER   • SKIN BIOPSY      5X-BASAL CELL CARCINOMA     Social History     Occupational History   • Not on file   Tobacco Use   • Smoking status: Former     Packs/day: 0.50     Years: 15.00     Pack years: 7.50     Types: Cigarettes, Pipe     Start date: 1960     Quit date: 1978     Years since quittin.2   • Smokeless tobacco: Never   • Tobacco comments:     Light smoker during this period   Vaping Use   • Vaping Use: Never  used   Substance and Sexual Activity   • Alcohol use: Yes     Alcohol/week: 2.0 standard drinks     Types: 2 Cans of beer per week     Comment: 2 drinks at social fumctioms   • Drug use: No   • Sexual activity: Not Currently     Partners: Female     Comment: Prostate removal nerve damage.          Allergies:   Allergies   Allergen Reactions   • Bacitracin-Polymyxin B Unknown - Low Severity   • Neomycin-Bacitracin Zn-Polymyx Unknown - Low Severity   • Iodine Rash   • Latex Rash       Medications:   Home Medications:  Current Outpatient Medications on File Prior to Visit   Medication Sig   • aspirin 81 MG chewable tablet Chew 1 tablet Daily.   • atorvastatin (LIPITOR) 10 MG tablet Take 1 tablet by mouth Daily.   • Cholecalciferol (VITAMIN D-3) 1000 UNITS capsule Take 2,000 Units by mouth Daily.   • clopidogrel (PLAVIX) 75 MG tablet Take 1 tablet by mouth Daily.   • Coenzyme Q10 (CO Q 10) 10 MG capsule Take 1 tablet by mouth Daily.   • desonide (DESOWEN) 0.05 % lotion Apply 1 application topically to the appropriate area as directed 2 (Two) Times a Day.   • ezetimibe (ZETIA) 10 MG tablet Take 1 tablet by mouth Daily.   • folic acid (FOLVITE) 1 MG tablet Take 1 tablet by mouth Daily.   • glucosamine-chondroitin 500-400 MG capsule capsule Take 2 capsules by mouth Daily.   • metoprolol succinate XL (TOPROL-XL) 25 MG 24 hr tablet TAKE 0.5 TABLET BY MOUTH DAILY. HOLD FOR HEART RATE LESS THAN 60 BPM   • Multiple Vitamins-Minerals (CENTRUM SILVER ADULT 50+ PO) Take 1 tablet by mouth Daily.   • nitroglycerin (NITROSTAT) 0.4 MG SL tablet Place 1 tablet under the tongue Every 5 (Five) Minutes As Needed.   • Omega-3 Fatty Acids (FISH OIL) 1000 MG capsule capsule Take 1 capsule by mouth Daily With Breakfast.   • pantoprazole (PROTONIX) 40 MG EC tablet Take 1 tablet by mouth Daily.   • Probiotic Product (PROBIOTIC DAILY PO) Take 1 capsule by mouth Daily.   • ramipril (ALTACE) 10 MG capsule Take 1 capsule by mouth Daily.   •  venlafaxine (EFFEXOR) 75 MG tablet Take 1 tablet by mouth Daily.   • vitamin B-12 (CYANOCOBALAMIN) 100 MCG tablet Take 50 mcg by mouth Daily.   • vitamin B-6 (PYRIDOXINE) 50 MG tablet Take 1 tablet by mouth Daily.   • methylPREDNISolone (MEDROL) 4 MG dose pack Take as directed on package instructions. (Patient not taking: Reported on 4/6/2023)     No current facility-administered medications on file prior to visit.         ROS:  14 point review of systems was negative except as listed in the HPI.    Physical Exam:   77 y.o. male  Body mass index is 30.79 kg/m²., 94.6 kg (208 lb 8 oz)  Vitals:    04/06/23 0946   Temp: 98.2 °F (36.8 °C)     General: Alert, cooperative, appears well and in no observable distress. Appears stated age and BMI as listed above.  HEENT: Normocephalic, atraumatic on external visual inspection.  CV: No significant peripheral edema.  Respiratory: Normal respiratory effort.  Skin: Warm & well perfused; appropriate skin turgor.  Psych: Appropriate mood & affect.  Neuro: Gross sensation and motor intact in affected extremity/extremities.  Vascular: Peripheral pulses palpable in affected extremity/extremities.     MSK Exam:  Cervical Spine:  No obvious deformity.  right sided tenderness: moderate, right greater than left paraspinous spasm, limitation of flexion: fairly severe, limitation of rotation to the right: fairly severe, limitation of rotation to the left: moderate, limitation of extension: fairly severe  Motor strength preserved in bilateral upper extremities.   He has altered and diminished sensation over the forearm on the right compared with the left.  Muscle testing of both upper extremities is grossly symmetric, no significant weakness on motor exam.   Negative El's sign bilaterally.    Shoulder exam grossly normal with symmetric ROM, no focal weakness, no significant pain with isometric testing of the rotator cuff.      Radiology:    Images were taken prior to the visit today and  were independently reviewed by me.     Multilevel cervical disc degeneration C3-4, C5-7.   Humerus negative.    Interpreted by Ariel Short MD on 3/29/2023  4:59 PM EDT   Narrative & Impression   XR HUMERUS RIGHT-, XR SPINE CERVICAL COMPLETE 4 OR 5 VW-clinical: Fell  last week, injured right shoulder, radicular symptoms     Right humerus findings: No osseous, articular nor soft tissue  abnormality.     CONCLUSION: Normal right humerus     Cervical spine findings: Vertebral body heights maintained. Satisfactory  alignment. C3-4 bony encroachment of the neural foramina bilaterally.  The remaining neuroforamina are patent's. No cervical rib. The odontoid  is preserved with satisfactory C1-2 alignment. Prevertebral soft tissues  within normal limits. There is disc space narrowing at C3-4 C5-6 and  C6-7.     CONCLUSION: No acute osseous abnormality nor subluxation. Degenerative  change as described above.     This report was finalized on 3/29/2023 4:53 PM by Dr. Neo Lorenzo M.D.       I have reviewed the images independently and agree with the findings as noted by the radiologist.    Procedure:   N/A    Misc. Data/Labs: N/A    Assessment & Plan:    ICD-10-CM ICD-9-CM   1. 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 Fall (on) (from) other stairs and steps, subsequent encounter  W10.8XXD E880.9     No orders of the defined types were placed in this encounter.    Orders Placed This Encounter   Procedures   • MRI Cervical Spine Without Contrast     This is a 77-year-old male who had a fall 2 weeks ago with an onset and worsening of right upper extremity pain, numbness and tingling which has been persistent despite conservative care.  He has degenerative changes on plain film x-rays but has some mild chronic neck pain that predates his current injury.  He has had a sudden and significant change in symptoms with new onset of numbness and tingling which is concerning for  nerve impingement of the cervical spine.  Plan is to proceed with an urgent MRI.    He has a nephew who is expected to pass on, he will need to go to that service in [place] next week probably.  Would like to get the MRI before then so in the event there is anything urgent we can address this in a timely manner.  Will likely refer him on to neurosurgery based on the results of the MRI.    Return for follow up after the MRI.    Patient encouraged to call with questions or concerns prior to follow up.  Recommend ICE and/or HEAT PRN as discussed.  Will discuss with attending physician as needed.  Consider additional referrals, work up and/or advanced imaging as indicated or if patient fails to respond to conservative care.        Tylor Lynch, APRN

## 2023-04-06 ENCOUNTER — OFFICE VISIT (OUTPATIENT)
Dept: ORTHOPEDIC SURGERY | Facility: CLINIC | Age: 78
End: 2023-04-06
Payer: MEDICARE

## 2023-04-06 VITALS — WEIGHT: 208.5 LBS | TEMPERATURE: 98.2 F | HEIGHT: 69 IN | BODY MASS INDEX: 30.88 KG/M2
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
## 2023-04-06 DIAGNOSIS — M47.22 DEGENERATIVE ARTHRITIS OF CERVICAL SPINE WITH NERVE COMPRESSION: Primary | ICD-10-CM

## 2023-04-06 PROCEDURE — 99214 OFFICE O/P EST MOD 30 MIN: CPT | Performed by: NURSE PRACTITIONER

## 2023-04-06 PROCEDURE — 1159F MED LIST DOCD IN RCRD: CPT | Performed by: NURSE PRACTITIONER

## 2023-04-06 PROCEDURE — 1160F RVW MEDS BY RX/DR IN RCRD: CPT | Performed by: NURSE PRACTITIONER

## 2023-04-07 ENCOUNTER — PATIENT MESSAGE (OUTPATIENT)
Dept: ORTHOPEDIC SURGERY | Facility: CLINIC | Age: 78
End: 2023-04-07
Payer: MEDICARE

## 2023-04-07 ENCOUNTER — TELEPHONE (OUTPATIENT)
Dept: ORTHOPEDIC SURGERY | Facility: CLINIC | Age: 78
End: 2023-04-07
Payer: MEDICARE

## 2023-04-07 DIAGNOSIS — M50.20 HERNIATED DISC, CERVICAL: ICD-10-CM

## 2023-04-07 DIAGNOSIS — M47.22 DEGENERATIVE ARTHRITIS OF CERVICAL SPINE WITH NERVE COMPRESSION: Primary | ICD-10-CM

## 2023-04-07 DIAGNOSIS — M50.20 HERNIATION OF INTERVERTEBRAL DISC OF CERVICAL SPINE WITHOUT RADICULOPATHY: ICD-10-CM

## 2023-04-07 RX ORDER — PREDNISONE 10 MG/1
10 TABLET ORAL 2 TIMES DAILY
Qty: 10 TABLET | Refills: 0 | Status: SHIPPED | OUTPATIENT
Start: 2023-04-07 | End: 2023-04-12

## 2023-04-10 ENCOUNTER — TELEPHONE (OUTPATIENT)
Dept: ORTHOPEDIC SURGERY | Facility: CLINIC | Age: 78
End: 2023-04-10
Payer: MEDICARE

## 2023-04-10 NOTE — TELEPHONE ENCOUNTER
Called patient and spoke with him in follow up to the message and MRI. He now has an appointment with Dr. Cagle on 23. Feeling some better this AM. He does have another steroid RX available for severe symptoms if/when he is out of town for his nephews  service. We talked about using heat packs as well while out of town. I will put an order for PT in hopes he might get in for some cervical traction before he leaves and then will be established with them should Dr. Cagle want to continue with conservative management.

## 2023-04-10 NOTE — TELEPHONE ENCOUNTER
Caller: Jed Correia    Relationship to patient: Self    Best call back number: [phone]      Patient is needing: WOULD LIKE A CALL BACK TO DISCUSS OPTIONS PRIOR TO SEEING NEURO SX.

## 2023-04-10 NOTE — TELEPHONE ENCOUNTER
Please let him know I am going to give him a call in just a bit. I am going to talk with Kathy Pyle when she gets into the office and get back to him.

## 2023-04-11 ENCOUNTER — TREATMENT (OUTPATIENT)
Dept: PHYSICAL THERAPY | Facility: CLINIC | Age: 78
End: 2023-04-11
Payer: MEDICARE

## 2023-04-11 DIAGNOSIS — M54.12 RADICULOPATHY, CERVICAL: Primary | ICD-10-CM

## 2023-04-11 DIAGNOSIS — M79.601 RIGHT ARM PAIN: ICD-10-CM

## 2023-04-11 DIAGNOSIS — M54.2 PAIN, NECK: ICD-10-CM

## 2023-04-11 DIAGNOSIS — Z74.09 IMPAIRED FUNCTIONAL MOBILITY AND ACTIVITY TOLERANCE: ICD-10-CM

## 2023-04-11 DIAGNOSIS — M47.22 DEGENERATIVE ARTHRITIS OF CERVICAL SPINE WITH NERVE COMPRESSION: ICD-10-CM
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
## 2023-04-11 PROCEDURE — 97110 THERAPEUTIC EXERCISES: CPT | Performed by: PHYSICAL THERAPIST

## 2023-04-11 PROCEDURE — 97161 PT EVAL LOW COMPLEX 20 MIN: CPT | Performed by: PHYSICAL THERAPIST

## 2023-04-11 PROCEDURE — 97530 THERAPEUTIC ACTIVITIES: CPT | Performed by: PHYSICAL THERAPIST

## 2023-04-11 PROCEDURE — 97140 MANUAL THERAPY 1/> REGIONS: CPT | Performed by: PHYSICAL THERAPIST

## 2023-04-11 NOTE — PROGRESS NOTES
Physical Therapy Initial Evaluation and Plan of Care  Clinic Location [address], [place] [id number]    Patient: Jed Correia   : 1945  Diagnosis/ICD-10 Code:  Radiculopathy, cervical [M54.12]  Referring practitioner: KARIS Peng  Date of Initial Visit: 2023  Today's Date: 2023  Patient seen for 1 session         Visit Diagnoses:    ICD-10-CM ICD-9-CM   1. Radiculopathy, cervical  M54.12 723.4   2. Right arm pain  M79.601 729.5   3. Pain, neck  M54.2 723.1   4. Impaired functional mobility and activity tolerance  Z74.09 V49.89         Subjective Questionnaire: NDI:       Subjective Evaluation    History of Present Illness  Mechanism of injury: Fell walking down steps while putting on tuxedo jacket 3/24/23. Fell on R side contacting R hand and knee. I had pain from R side of neck to shoulder. R shoulder imaging normal. Pain has intensified in R shoulder and upper arm. Cervical MRI revealed Large R sided disc herniation C5-6, multilevel DDD.  Currently describes pain in R side of neck and upper arm. Tingling in forearm. Digits 1-3 are numb, constant.   When active, pain does not bother me that much.  Pain increased laying flat (for MRI) and looking up (changing battery in smoke alarm).  Tylenol prn  Having trouble sleeping. I need to sleep on L. Walking every 1.5 hrs. Sleeps best in chair.  No R hand weakness  Denies neck pain prior to fall. Hx of R neck/shoulder pain w/ reaching to floor with R hand.      Patient Occupation: Retired Pain  Current pain rating: 3  At worst pain ratin  Location: Neck, R UE  Quality: dull ache, radiating and discomfort  Relieving factors: medications and heat  Aggravating factors: sleeping and overhead activity  Progression: improved    Social Support  Lives with: spouse    Hand dominance: right    Diagnostic Tests  X-ray: abnormal  MRI studies: abnormal    Treatments  Current treatment: physical therapy  Patient  Goals  Patient goals for therapy: decreased pain, increased motion, increased strength and independence with ADLs/IADLs             Objective        Special Questions  Patient is experiencing night pain and disturbed sleep.       Static Posture     Head  Forward.    Thoracic Spine  Hyperkyphosis.    Postural Observations  Seated posture: fair (Slumped in chair)        Palpation     Right Tenderness of the cervical paraspinals, levator scapulae, scalenes and upper trapezius.     Active Range of Motion   Cervical/Thoracic Spine   Cervical    Flexion: 50 degrees   Extension: 25 (R cervical) degrees with pain  Left lateral flexion: 30 degrees   Right lateral flexion: 20 degrees with pain  Left rotation: 52 (Spasm, R arm tingling) degrees with pain  Right rotation: 45 degrees with pain    Strength/Myotome Testing     Left Shoulder     Planes of Motion   Flexion: 5   Abduction: 5   External rotation at 0°: 4   Internal rotation at 0°: 5     Right Shoulder     Planes of Motion   Flexion: 5   Abduction: 4   External rotation at 0°: 4   Internal rotation at 0°: 5     Left Elbow   Flexion: 5  Extension: 5    Right Elbow   Flexion: 5  Extension: 5    Left Wrist/Hand   Wrist extension: 5  Wrist flexion: 5     (2nd hand position)   Left  strength (2nd hand position) 67 lbs    Right Wrist/Hand   Wrist extension: 5  Wrist flexion: 5     (2nd hand position)   Right  strength (2nd hand position) 55 lbs          Assessment & Plan     Assessment  Impairments: abnormal muscle tone, abnormal or restricted ROM, activity intolerance, impaired physical strength, lacks appropriate home exercise program and pain with function  Functional Limitations: carrying objects, lifting, sleeping, pulling, pushing and uncomfortable because of pain  Assessment details: Pt presents with R cervical radiculopathy after fall 3/24/23. Objective findings include painful and limited cervical AROM, tenderness, and mild R  weakness. R UE MMT  WNL. Will benefit from skilled PT services in order to address listed impairments and increase tolerance to normal daily activities including ADLs and recreational activities.    Prognosis: fair  Prognosis details: Poor response to cervical traction, increased R UE symptoms    Goals  Plan Goals: Short Term Goals: 2-4 weeks  Patient will:  1. Be independent with initial HEP  2. Be instructed in posture and body mechanics  3. Report >/=25% reduction in pain with all daily activities    Long Term Goals: 6-12 weeks  Patient will:  1. Report pain of </= 2/10 with all daily activities  2. Demonstrate no soft tissue restriction in involved musculature to allow cervical AROM WFL  3. Able to lift 10# from floor and overhead with proper mechanics allowing for performance of ADLs/household management/recreational activities without increased symptoms.  4. Perceived disability </=10% as measured by Neck Disability Index    Plan  Therapy options: will be seen for skilled therapy services  Planned modality interventions: cryotherapy, electrical stimulation/Russian stimulation, thermotherapy (hydrocollator packs), traction, ultrasound and dry needling  Planned therapy interventions: abdominal trunk stabilization, ADL retraining, balance/weight-bearing training, body mechanics training, flexibility, functional ROM exercises, gait training, home exercise program, joint mobilization, manual therapy, neuromuscular re-education, soft tissue mobilization, spinal/joint mobilization, strengthening, stretching and therapeutic activities  Frequency: 2x week  Duration in weeks: 12  Treatment plan discussed with: patient        History # of Personal Factors and/or Comorbidities: LOW (0)  Examination of Body System(s): # of elements: LOW (1-2)  Clinical Presentation: STABLE   Clinical Decision Making: LOW       Timed:         Manual Therapy:    8     mins  61511;     Therapeutic Exercise:    15     mins  80636;     Neuromuscular Luis:    0     mins  72986;    Therapeutic Activity:     15     mins  91157;     Gait Trainin     mins  26089;     Ultrasound:     0     mins  49133;    Ionto                               0    mins   35939  Self Care                       0     mins   42067  Canalith Repos    0     mins 69253      Un-Timed:  Electrical Stimulation:    0     mins  86156 ( );  Dry Needling     0     mins self-pay  Traction     6     mins 98714  Low Eval     20     Mins  83463  Mod Eval     0     Mins  26823  High Eval                       0     Mins  94932        Timed Treatment:   38   mins   Total Treatment:     64   mins          PT: Bridgette Christie PT     License Number: [id number]  Electronically signed by Bridgette Christie PT, 23, 1:02 PM EDT    Certification Period: 2023 thru 2023  I certify that the therapy services are furnished while this patient is under my care.  The services outlined above are required by this patient, and will be reviewed every 90 days.         Physician Signature:__________________________________________________    PHYSICIAN: Tylor Lynch APRN  NPI: [id number]                                      DATE:      Please sign and return via fax to [email or web address] . Thank you, [facility].

## 2023-04-18 ENCOUNTER — TREATMENT (OUTPATIENT)
Dept: PHYSICAL THERAPY | Facility: CLINIC | Age: 78
End: 2023-04-18
Payer: MEDICARE

## 2023-04-18 DIAGNOSIS — M79.601 RIGHT ARM PAIN: ICD-10-CM

## 2023-04-18 DIAGNOSIS — M54.12 RADICULOPATHY, CERVICAL: Primary | ICD-10-CM

## 2023-04-18 DIAGNOSIS — M54.2 PAIN, NECK: ICD-10-CM

## 2023-04-18 DIAGNOSIS — Z74.09 IMPAIRED FUNCTIONAL MOBILITY AND ACTIVITY TOLERANCE: ICD-10-CM

## 2023-04-20 NOTE — PROGRESS NOTES
Subjective   Patient ID: Jed Correia is a 77 y.o. male is being seen for consultation today at the request of KARIS Peng    History of Present Illness    {Common H&P Review Areas:68266}    Review of Systems    ***    Objective     There were no vitals filed for this visit.  There is no height or weight on file to calculate BMI.    Tobacco Use: Medium Risk   • Smoking Tobacco Use: Former   • Smokeless Tobacco Use: Never   • Passive Exposure: Not on file          Physical Exam  Neurologic Exam        Assessment & Plan   Independent Review of Radiographic Studies:      I personally reviewed the images from the following studies.    ***    Medical Decision Making:      ***     There are no diagnoses linked to this encounter.  No follow-ups on file.

## 2023-04-20 NOTE — PROGRESS NOTES
Subjective   History of Present Illness: Jed Correia is a 77 y.o. male is being seen for consultation today at the request of KARIS Peng for right arm pain with numbness in his right thumb, index and middle finger that began 1 month ago after sustaining a fall.  He says he is occasionally had right arm pain when he bends over to  a napkin or something of that nature but it would be fleeting in nature until about a month ago when it became very constant.  He may have a weak sensation in the right arm as well as the pain centers around the bicep area and then radiates with numbness into mostly the thumb.  He denies neck pain and headaches. He is currently going to physical therapy and has shown significant improvement, they started traction 2 visits ago.     Arm Pain   The incident occurred more than 1 week ago. The injury mechanism was a fall. The pain is present in the right clavicle, right elbow, right fingers, right forearm, right hand, right shoulder, right wrist and upper right arm. The quality of the pain is described as aching, burning, cramping, shooting and stabbing. The pain is moderate. Associated symptoms include numbness. Pertinent negatives include no chest pain, muscle weakness or tingling. Treatments tried: physical therapy.       Tobacco Use: Medium Risk   • Smoking Tobacco Use: Former   • Smokeless Tobacco Use: Never   • Passive Exposure: Not on file        The following portions of the patient's history were reviewed and updated as appropriate: allergies, current medications, past family history, past medical history, past social history, past surgical history and problem list.    Review of Systems   Respiratory: Negative for chest tightness and shortness of breath.    Cardiovascular: Negative for chest pain.   Neurological: Positive for numbness. Negative for tingling.       Objective     Vitals:    04/25/23 0928   BP: 128/80   Pulse: 74   SpO2: 97%   Weight: 94.3 kg (208 lb)  "  Height: 175.3 cm (69\")     Body mass index is 30.72 kg/m².      Physical Exam  Neurologic Exam    Physical Exam:    CONSTITUTIONAL: This 77 year old  male appears well developed, well-nourished and in no acute distress.    HEAD & FACE: the head and face are symmetric, normocephalic and atraumatic.    EYES: Inspection of the conjunctivae and lids reveals no swelling, erythema or discharge.  Pupils are round, equal and reactive to light and there is no scleral icterus.    EARS, NOSE, MOUTH & THROAT: On inspection, the ears and nose are within normal limits.    NECK: the neck is supple and symmetric. The trachea is midline with no masses.  Range of motion shows that he has some accelerated pain with extension.    PULMONARY: Respiratory effort is normal with no increased work of breathing or signs of respiratory distress.    CARDIOVASCULAR: Pedal pulses are +2/4 bilaterally. Examination of the extremities shows no edema or varicosities.    MUSCULOSKELETAL: Gait and station are within normal limits. The spine has normal alignment and range of motion.    SKIN: The skin is warm, dry and intact    NEUROLOGIC:   Cranial Nerves 2-12 intact  Normal motor strength noted low regards the right upper extremity. Muscle bulk and tone are normal.  Sensory exam reveals dense numbness in the right thumb with less significant numbness in the index and middle finger  Reflexes on the right side demonstrates 1/4 Triceps Reflex, 0/4 Biceps Reflex, 0/4 Brachioradialis Reflex on the right.   Reflexes on the left side demonstrates 1/4 Triceps Reflex, 2/4 Biceps Reflex, 1/4 Brachioradialis Reflex on the left.  Superficial/Primitive Reflexes: primitive reflexes were absent.  El's, Babinski, and Clonus signs all negative.  No coordination deficit observed.  Radicular testing showed a negative Spurling's maneuver today  Cortical function is intact and without deficits. Speech is normal.    PSYCHIATRIC: oriented to person, place and " time. Patient's mood and affect are normal.    Assessment & Plan   Independent Review of Radiographic Studies:      I personally reviewed the images from the following studies.    MRI of the cervical spine done at [facility] on April 6, 2023 reveals right-sided disc herniation at C5-C6.  There is also smaller right-sided disc osteophyte complex at C6-C7 with only mild spinal and neuroforaminal narrowing.    Medical Decision Making:      He has a C6 radiculopathy in the right upper extremity I think correlates well with the disc herniation seen at C5-C6.  He does have some overlapping C7 nerve root symptomatology but he has only mild neuroforaminal stenosis to the right at C6-C7 that I do not think is surgically significant.  He is improving with physical therapy over the last 2 weeks so we should continue the physical therapy for a full course, using the traction.  In addition to the physical therapy he could try an epidural steroid injection to try to avoid surgery.  If he does not gain complete relief and still has neurologic compromise in the right upper extremity then an anterior cervical discectomy and fusion at C6-C7 would be considered.    In an effort to expedite his recovery we will ask cardiology for clearance for general anesthesia for routine cervical surgery while he undergoes the continued conservative treatment in hopes to avoid that surgery.    Return in about 3 weeks (around 5/16/2023) for discussion of Physical Therapy results, discussion of results of pain management.    Diagnoses and all orders for this visit:    1. Herniation of cervical intervertebral disc with radiculopathy (Primary)  -     Ambulatory Referral to Physical Therapy Evaluate and treat  -     Epidural Block  -     Ambulatory Referral to Cardiology             Andres Cagle MD FACS [facility]  Neurological Surgery

## 2023-04-21 ENCOUNTER — TREATMENT (OUTPATIENT)
Dept: PHYSICAL THERAPY | Facility: CLINIC | Age: 78
End: 2023-04-21
Payer: MEDICARE

## 2023-04-21 DIAGNOSIS — M79.601 RIGHT ARM PAIN: ICD-10-CM

## 2023-04-21 DIAGNOSIS — Z74.09 IMPAIRED FUNCTIONAL MOBILITY AND ACTIVITY TOLERANCE: ICD-10-CM

## 2023-04-21 DIAGNOSIS — M54.2 PAIN, NECK: ICD-10-CM

## 2023-04-21 DIAGNOSIS — M54.12 RADICULOPATHY, CERVICAL: Primary | ICD-10-CM

## 2023-04-21 NOTE — PROGRESS NOTES
Physical Therapy Daily Treatment Note      Patient: Jed Correia   : 1945  Referring practitioner: KARIS Peng  Date of Initial Visit: Type: THERAPY  Noted: 2023  Today's Date: 2023  Patient seen for 3 sessions       Visit Diagnoses:    ICD-10-CM ICD-9-CM   1. Radiculopathy, cervical  M54.12 723.4   2. Right arm pain  M79.601 729.5   3. Pain, neck  M54.2 723.1   4. Impaired functional mobility and activity tolerance  Z74.09 V49.89       Subjective   Persistent R hand numbness. Mild pain in R upper arm.    Objective   See Exercise, Manual, and Modality Logs for complete treatment.     Assessment/Plan  Improved tolerance to R upper trapezius stretch w/o R UE symptoms. Poor tolerance to hooklying cervical traction (<10 mins) reporting increased R UE pain. May benefit from dry needling pending neurosurgeon evaluation. Progress per POC.    Timed:         Manual Therapy:    15     mins  26180;     Therapeutic Exercise:    8     mins  31703;     Neuromuscular Luis:    0    mins  92926;    Therapeutic Activity:     0     mins  70828;     Gait Trainin     mins  32374;     Ultrasound:     0     mins  56390;    Ionto                               0    mins   89111  Traction 8 mins      Timed Treatment:   23   mins   Total Treatment:     31   mins    HERMAN Perez License: [id number]

## 2023-04-25 ENCOUNTER — OFFICE VISIT (OUTPATIENT)
Dept: NEUROSURGERY | Facility: CLINIC | Age: 78
End: 2023-04-25
Payer: MEDICARE

## 2023-04-25 VITALS
OXYGEN SATURATION: 97 % | BODY MASS INDEX: 30.81 KG/M2 | HEIGHT: 69 IN | HEART RATE: 74 BPM | SYSTOLIC BLOOD PRESSURE: 128 MMHG | WEIGHT: 208 LBS | DIASTOLIC BLOOD PRESSURE: 80 MMHG

## 2023-04-25 DIAGNOSIS — M50.10 HERNIATION OF CERVICAL INTERVERTEBRAL DISC WITH RADICULOPATHY: Primary | ICD-10-CM

## 2023-04-25 PROCEDURE — 99204 OFFICE O/P NEW MOD 45 MIN: CPT | Performed by: NEUROLOGICAL SURGERY

## 2023-04-25 PROCEDURE — 1160F RVW MEDS BY RX/DR IN RCRD: CPT | Performed by: NEUROLOGICAL SURGERY

## 2023-04-25 PROCEDURE — 1159F MED LIST DOCD IN RCRD: CPT | Performed by: NEUROLOGICAL SURGERY

## 2023-04-27 ENCOUNTER — PATIENT ROUNDING (BHMG ONLY) (OUTPATIENT)
Dept: NEUROSURGERY | Facility: CLINIC | Age: 78
End: 2023-04-27
Payer: MEDICARE

## 2023-04-28 ENCOUNTER — TREATMENT (OUTPATIENT)
Dept: PHYSICAL THERAPY | Facility: CLINIC | Age: 78
End: 2023-04-28
Payer: MEDICARE

## 2023-04-28 DIAGNOSIS — M79.601 RIGHT ARM PAIN: ICD-10-CM

## 2023-04-28 DIAGNOSIS — M54.12 RADICULOPATHY, CERVICAL: Primary | ICD-10-CM

## 2023-04-28 DIAGNOSIS — M54.2 PAIN, NECK: ICD-10-CM

## 2023-04-28 DIAGNOSIS — Z74.09 IMPAIRED FUNCTIONAL MOBILITY AND ACTIVITY TOLERANCE: ICD-10-CM

## 2023-04-28 NOTE — PROGRESS NOTES
Physical Therapy Daily Treatment Note      Patient: Jed Correia   : 1945  Referring practitioner: KARIS Peng  Date of Initial Visit: Type: THERAPY  Noted: 2023  Today's Date: 2023  Patient seen for 4 sessions       Visit Diagnoses:    ICD-10-CM ICD-9-CM   1. Radiculopathy, cervical  M54.12 723.4   2. Right arm pain  M79.601 729.5   3. Pain, neck  M54.2 723.1   4. Impaired functional mobility and activity tolerance  Z74.09 V49.89       Subjective   Rates R upper arm pain 2-3/10. Continued numbness in R digits 1-3.    Objective   See Exercise, Manual, and Modality Logs for complete treatment.     Assessment/Plan   Pt reported decreased R hand numbness w/ cervical traction; no change in R upper arm pain.     Soft tissue was assessed at cervical spine. PT noted point tenderness as well as palpable trigger points within the muscle tissue. On this date patient stated that they would like to undergo a dry needling procedure for the soft tissue dysfunction.   Patient was educated on the procedure for dry needling and consent waver was signed. Patient was informed of the risks, possible adverse effects, along with the benefits of TDN.     Achieved desirable twitch response in R upper trapezius. R arm pain decreased with dry needling.    Timed:         Manual Therapy:    0     mins  81401;     Therapeutic Exercise:    0     mins  99499;     Neuromuscular Luis:    0    mins  51180;    Therapeutic Activity:     0     mins  53684;     Gait Trainin     mins  61854;     Ultrasound:     0     mins  40643;    Ionto                               0    mins   88352  Traction 10 mins  Dry needling 20 mins        Timed Treatment:   10   mins   Total Treatment:     30   mins    Bridgette Christie, PT  [place] License: [id number]

## 2023-05-01 ENCOUNTER — PRE-PROCEDURE SCREENING (OUTPATIENT)
Dept: GASTROENTEROLOGY | Facility: CLINIC | Age: 78
End: 2023-05-01
Payer: MEDICARE

## 2023-05-02 ENCOUNTER — TREATMENT (OUTPATIENT)
Dept: PHYSICAL THERAPY | Facility: CLINIC | Age: 78
End: 2023-05-02
Payer: MEDICARE

## 2023-05-02 DIAGNOSIS — M54.12 RADICULOPATHY, CERVICAL: Primary | ICD-10-CM

## 2023-05-02 DIAGNOSIS — M54.2 PAIN, NECK: ICD-10-CM

## 2023-05-02 DIAGNOSIS — M79.601 RIGHT ARM PAIN: ICD-10-CM

## 2023-05-02 DIAGNOSIS — Z74.09 IMPAIRED FUNCTIONAL MOBILITY AND ACTIVITY TOLERANCE: ICD-10-CM
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
## 2023-05-04 ENCOUNTER — TREATMENT (OUTPATIENT)
Dept: PHYSICAL THERAPY | Facility: CLINIC | Age: 78
End: 2023-05-04
Payer: MEDICARE

## 2023-05-04 ENCOUNTER — HOSPITAL ENCOUNTER (OUTPATIENT)
Dept: GENERAL RADIOLOGY | Facility: HOSPITAL | Age: 78
Discharge: HOME | End: 2023-05-04
Payer: MEDICARE

## 2023-05-04 ENCOUNTER — HOSPITAL ENCOUNTER (OUTPATIENT)
Dept: PAIN MEDICINE | Facility: HOSPITAL | Age: 78
Discharge: HOME | End: 2023-05-04
Payer: MEDICARE

## 2023-05-04 ENCOUNTER — ANESTHESIA (OUTPATIENT)
Dept: PAIN MEDICINE | Facility: HOSPITAL | Age: 78
End: 2023-05-04
Payer: MEDICARE

## 2023-05-04 ENCOUNTER — ANESTHESIA EVENT (OUTPATIENT)
Dept: PAIN MEDICINE | Facility: HOSPITAL | Age: 78
End: 2023-05-04
Payer: MEDICARE

## 2023-05-04 VITALS
TEMPERATURE: 98.2 F | OXYGEN SATURATION: 99 % | RESPIRATION RATE: 14 BRPM | DIASTOLIC BLOOD PRESSURE: 86 MMHG | SYSTOLIC BLOOD PRESSURE: 170 MMHG | HEART RATE: 69 BPM

## 2023-05-04 DIAGNOSIS — M79.601 RIGHT ARM PAIN: ICD-10-CM

## 2023-05-04 DIAGNOSIS — M50.10 HERNIATION OF CERVICAL INTERVERTEBRAL DISC WITH RADICULOPATHY: Primary | ICD-10-CM

## 2023-05-04 DIAGNOSIS — M54.12 RADICULOPATHY, CERVICAL: Primary | ICD-10-CM

## 2023-05-04 DIAGNOSIS — Z74.09 IMPAIRED FUNCTIONAL MOBILITY AND ACTIVITY TOLERANCE: ICD-10-CM

## 2023-05-04 DIAGNOSIS — M54.2 PAIN, NECK: ICD-10-CM

## 2023-05-04 DIAGNOSIS — R52 PAIN: ICD-10-CM

## 2023-05-04 PROCEDURE — 77003 FLUOROGUIDE FOR SPINE INJECT: CPT

## 2023-05-04 PROCEDURE — 25010000002 DEXAMETHASONE SODIUM PHOSPHATE 10 MG/ML SOLUTION: Performed by: ANESTHESIOLOGY

## 2023-05-04 RX ORDER — FENTANYL CITRATE 50 UG/ML
50 INJECTION, SOLUTION INTRAMUSCULAR; INTRAVENOUS AS NEEDED
Status: DISCONTINUED | OUTPATIENT
Start: 2023-05-04 | End: 2023-05-05 | Stop reason: HOSPADM

## 2023-05-04 RX ORDER — DEXAMETHASONE SODIUM PHOSPHATE 10 MG/ML
10 INJECTION, SOLUTION INTRAMUSCULAR; INTRAVENOUS ONCE
Status: COMPLETED | OUTPATIENT
Start: 2023-05-04 | End: 2023-05-04

## 2023-05-04 RX ORDER — MIDAZOLAM HYDROCHLORIDE 1 MG/ML
1 INJECTION INTRAMUSCULAR; INTRAVENOUS AS NEEDED
Status: DISCONTINUED | OUTPATIENT
Start: 2023-05-04 | End: 2023-05-05 | Stop reason: HOSPADM

## 2023-05-04 RX ORDER — LIDOCAINE HYDROCHLORIDE 10 MG/ML
1 INJECTION, SOLUTION INFILTRATION; PERINEURAL ONCE AS NEEDED
Status: DISCONTINUED | OUTPATIENT
Start: 2023-05-04 | End: 2023-05-05 | Stop reason: HOSPADM

## 2023-05-04 RX ORDER — SODIUM CHLORIDE 0.9 % (FLUSH) 0.9 %
1-10 SYRINGE (ML) INJECTION AS NEEDED
Status: DISCONTINUED | OUTPATIENT
Start: 2023-05-04 | End: 2023-05-05 | Stop reason: HOSPADM

## 2023-05-04 RX ADMIN — DEXAMETHASONE SODIUM PHOSPHATE 10 MG: 10 INJECTION, SOLUTION INTRAMUSCULAR; INTRAVENOUS at 12:01

## 2023-05-04 NOTE — H&P
CHIEF COMPLAINT: Right arm pain      HISTORY OF PRESENT ILLNESS:  He complains of right upper extremity pain and numbness.  Constant timing but worse with activity.  He has an MRI that shows displaced disc    PAST MEDICAL HISTORY:  Current Outpatient Medications on File Prior to Encounter   Medication Sig Dispense Refill   • aspirin 81 MG chewable tablet Chew 1 tablet Daily.     • atorvastatin (LIPITOR) 10 MG tablet Take 1 tablet by mouth Daily.     • Cholecalciferol (VITAMIN D-3) 1000 UNITS capsule Take 2,000 Units by mouth Daily.     • clopidogrel (PLAVIX) 75 MG tablet Take 1 tablet by mouth Daily.     • Coenzyme Q10 (CO Q 10) 10 MG capsule Take 1 tablet by mouth Daily.     • desonide (DESOWEN) 0.05 % lotion Apply 1 application topically to the appropriate area as directed 2 (Two) Times a Day.     • ezetimibe (ZETIA) 10 MG tablet Take 1 tablet by mouth Daily.     • folic acid (FOLVITE) 1 MG tablet Take 1 tablet by mouth Daily.     • glucosamine-chondroitin 500-400 MG capsule capsule Take 2 capsules by mouth Daily.     • metoprolol succinate XL (TOPROL-XL) 25 MG 24 hr tablet TAKE 0.5 TABLET BY MOUTH DAILY. HOLD FOR HEART RATE LESS THAN 60 BPM     • Multiple Vitamins-Minerals (CENTRUM SILVER ADULT 50+ PO) Take 1 tablet by mouth Daily.     • nitroglycerin (NITROSTAT) 0.4 MG SL tablet Place 1 tablet under the tongue Every 5 (Five) Minutes As Needed.  1   • Omega-3 Fatty Acids (FISH OIL) 1000 MG capsule capsule Take 1 capsule by mouth Daily With Breakfast.     • pantoprazole (PROTONIX) 40 MG EC tablet Take 1 tablet by mouth Daily.     • Probiotic Product (PROBIOTIC DAILY PO) Take 1 capsule by mouth Daily.     • ramipril (ALTACE) 10 MG capsule Take 1 capsule by mouth Daily.     • venlafaxine (EFFEXOR) 75 MG tablet Take 1 tablet by mouth Daily.     • vitamin B-12 (CYANOCOBALAMIN) 100 MCG tablet Take 50 mcg by mouth Daily.     • vitamin B-6 (PYRIDOXINE) 50 MG tablet Take 1 tablet by mouth Daily.       No current  facility-administered medications on file prior to encounter.       Past Medical History:   Diagnosis Date   • Arthritis    • Arthritis of neck    • Cancer     COLON, PROSTATE, SKIN   • Cervical disc disorder    • H/O arthroscopy of left knee    • H/O arthroscopy of right knee    • Hyperlipidemia    • Hypertension    • Positional sleep apnea 09/18/2019    Home sleep study.  AHI normal at 2/h for total monitoring time.  When supine, mildly abnormal at 6.4 events per hour.  No sleep-related hypoxia.   • Tear of meniscus of knee    • Thoracic disc disorder          SOCIAL HISTORY:  No tobacco    REVIEW OF SYSTEMS:  No hematologic infectious or constitutional symptoms  Positive SHARA screen/DX:  2 or more mitigating factors  non-supine position, no narcs        PHYSICAL EXAM:  There were no vitals taken for this visit.    Well-developed well-nourished no acute distress  Extra ocular movements intact  Mallampati class II airway  Alert and oriented ×3  =5 out of 5 strength bilateral upper and lower extremities  Cervical spine without obvious deformities ecchymoses  Cervical spine nontender to palpation      DIAGNOSIS:  Post-Op Diagnosis Codes:     * Cervical radiculopathy [M54.12]    PLAN:  1.  Cervical 6 epidural steroid injections, up to 3, spaced 1-2 weeks apart.  If pain control is acceptable after 1 or 2 injections, it was discussed with the patient that they may return for the subsequent injections if and when their pain returns.  The risks were discussed with the patient including failure of relief, worsening pain, Headache (post dural puncture headache), bleeding (epidural hematoma) and infection (epidural abscess or skin infection).  2.  Physical therapy exercises at home as prescribed by physical therapy or from the pain clinic handout.  Continuation of these exercises every day, or multiple times per week, even when the patient has good pain relief, was stressed to the patient as a preventative measure to  decrease the frequency and severity of future pain episodes.  3.  Continue pain medicines as already prescribed.  If patient not currently taking any, it is recommended to begin Acetaminophen 1000 mg po q 8 hours.  If other medicines containing Acetaminophen are currently prescribed, maintain daily dose at 3000 mg.    4.  If they can tolerate NSAIDS, it is recommended to take Ibuprofen 600 mg po q 6 hours for 7 days during pain exacerbations.  Alternatively, they may substitute an NSAID of their choice (e.g. Aleve).  This may be taken at the same time as Acetaminophen.  5.  Heat and ice to the affected area as tolerated for pain control.    6.  Low impact exercise such as walking or water exercise was recommended to maintain overall health and aid in weight control.   7.  Follow up as needed for subsequent injections.

## 2023-05-04 NOTE — PROGRESS NOTES
Physical Therapy Daily Treatment Note      Patient: Jed Correia   : 1945  Referring practitioner: KARIS Peng  Date of Initial Visit: Type: THERAPY  Noted: 2023  Today's Date: 2023  Patient seen for 6 sessions       Visit Diagnoses:    ICD-10-CM ICD-9-CM   1. Radiculopathy, cervical  M54.12 723.4   2. Right arm pain  M79.601 729.5   3. Pain, neck  M54.2 723.1   4. Impaired functional mobility and activity tolerance  Z74.09 V49.89       Subjective   Mild R upper arm when driving w/ hand on wheel. Thumb is numb. Plan for cervical injection today.      Objective   See Exercise, Manual, and Modality Logs for complete treatment.     Assessment/Plan  R UE symptoms continue to improve. Reviewed seated posture and cautioned against cervical protraction when driving. Progress per POC.      Timed:         Manual Therapy:    15     mins  59972;     Therapeutic Exercise:    8     mins  60738;     Neuromuscular Luis:    0    mins  55307;    Therapeutic Activity:     0     mins  30902;     Gait Trainin     mins  89294;     Ultrasound:     0     mins  73623;    Ionto                               0    mins   21641  Traction 10 mins        Timed Treatment:   23   mins   Total Treatment:     33   mins    Bridgette Chrisite, PT  [place] License: [id number]

## 2023-05-04 NOTE — ANESTHESIA PROCEDURE NOTES
PAIN Epidural block      Patient reassessed immediately prior to procedure    Patient location during procedure: pain clinic  Indication:procedure for pain  Performed By  Anesthesiologist: Andres Riley MD  Preanesthetic Checklist  Completed: patient identified and risks and benefits discussed  Additional Notes  Diagnosis:   Post-Op Diagnosis Codes:     * Cervical radiculopathy (M54.12)    Sedation:  none    Sedation time:    Under fluoroscopic guidance, the epidural space was identified and accessed, confirmed by loss of resistance to saline.  The above medications were injected uneventfully.    Prep:  Pt Position:prone  Sterile Tech:cap, gloves, mask and sterile barrier  Prep:chlorhexidine gluconate and isopropyl alcohol  Monitoring:blood pressure monitoring, continuous pulse oximetry and EKG  Procedure:Sedation: no     Approach:midline  Guidance: fluoroscopy  Location:cervical  Level:6-7  Needle Type:Tuohy  Needle Gauge:20  Aspiration:negative  Medications:  Comments:Decadron 10 mg preservative-free  Post Assessment:  Pt Tolerance:patient tolerated the procedure well with no apparent complications  Complications:no

## 2023-05-12 ENCOUNTER — TREATMENT (OUTPATIENT)
Dept: PHYSICAL THERAPY | Facility: CLINIC | Age: 78
End: 2023-05-12
Payer: MEDICARE

## 2023-05-12 DIAGNOSIS — M79.601 RIGHT ARM PAIN: ICD-10-CM

## 2023-05-12 DIAGNOSIS — M54.2 PAIN, NECK: ICD-10-CM

## 2023-05-12 DIAGNOSIS — Z74.09 IMPAIRED FUNCTIONAL MOBILITY AND ACTIVITY TOLERANCE: ICD-10-CM

## 2023-05-12 DIAGNOSIS — M54.12 RADICULOPATHY, CERVICAL: Primary | ICD-10-CM

## 2023-05-12 NOTE — PROGRESS NOTES
Re-Assessment / Re-Certification        Patient: Jed Correia   : 1945  Diagnosis/ICD-10 Code:  Radiculopathy, cervical [M54.12]  Referring practitioner: KARIS Peng  Date of Initial Evaluation:  Type: THERAPY  Noted: 2023  Patient seen for 7 sessions      Subjective:   Jed Correia reports: No R arm or neck pain. R fingers (1-3) are still numb.    Subjective Questionnaire: NDI: DNT  Clinical Progress: improved  Home Program Compliance: Yes  Treatment has included: therapeutic exercise, neuromuscular re-education, manual therapy, therapeutic activity, traction, dry needling, moist heat and cryotherapy    Subjective   Objective   Static Posture      Head  Forward.     Thoracic Spine  Hyperkyphosis.     Active Range of Motion   Cervical/Thoracic Spine   Cervical     Flexion: 50   Extension: 50   Left lateral flexion: 40   Right lateral flexion: 28   Left rotation: 58   Right rotation: 58     Strength/Myotome Testing     Left Shoulder      Planes of Motion   Flexion: 5   Abduction: 5   External rotation at 0°: 4   Internal rotation at 0°: 5     Right Shoulder      Planes of Motion   Flexion: 5   Abduction: 5   External rotation at 0°: 4   Internal rotation at 0°: 5      Left Elbow   Flexion: 5  Extension: 5     Right Elbow   Flexion: 5  Extension: 5     Left Wrist/Hand   Wrist extension: 5  Wrist flexion: 5      (2nd hand position)   Left  strength (2nd hand position) 62 lbs     Right Wrist/Hand   Wrist extension: 5  Wrist flexion: 5      (2nd hand position)   Right  strength (2nd hand position) 60 lbs     Goals  Plan Goals: Short Term Goals: 2-4 weeks  Patient will:  1. Be independent with initial HEP  2. Be instructed in posture and body mechanics  3. Report >/=25% reduction in pain with all daily activities  ALL MET    Long Term Goals: 6-12 weeks  Patient will:  1. Report pain of </= 2/10 with all daily activities MET  2. Demonstrate no soft tissue restriction in involved  musculature to allow cervical AROM WFL MET  3. Able to lift 10# from floor and overhead with proper mechanics allowing for performance of ADLs/household management/recreational activities without increased symptoms. DNT  4. Perceived disability </=10% as measured by Neck Disability Index DNT    Assessment/Plan  Progress toward previous goals: Partially Met    No longer c/o R UE pain. Continues to have R hand numbness. Explained nerve healing time is ~1mm/day. Will continue to benefit from skilled PT to address unmet goals and optimize posture.      Recommendations: Continue as planned  Timeframe: 2 months  Prognosis to achieve goals: good    PT Signature: Bridgette Christie PT    Based upon review of the patient's progress and continued therapy plan, it is my medical opinion that Jed Correia should continue physical therapy treatment at [facility]  [address] [place] [id number] [phone].    Signature: __________________________________  Tylor Lynch APRN    Manual Therapy:    13     mins  98604;  Therapeutic Exercise:    10     mins  16211;     Neuromuscular Luis:    0    mins  30337;    Therapeutic Activity:     0     mins  86697;     Gait Trainin     mins  40763;     Ultrasound:     0     mins  64755;    Work Hardening           0      mins 26811  Iontophoresis               0   mins 16363  Traction 10 mins    Timed Treatment:   23   mins   Total Treatment:     33   mins

## 2023-05-15 ENCOUNTER — TELEPHONE (OUTPATIENT)
Dept: NEUROSURGERY | Facility: CLINIC | Age: 78
End: 2023-05-15
Payer: MEDICARE

## 2023-05-15 NOTE — TELEPHONE ENCOUNTER
Patient called stating our office called and stating Dr. Cagle could not see patient on 5/25/23 at 12 needing to change appointment at 10 am.  I informed patient he has been rescheduled 5/25/23 at 10 am.  Patient understood

## 2023-05-16 ENCOUNTER — TREATMENT (OUTPATIENT)
Dept: PHYSICAL THERAPY | Facility: CLINIC | Age: 78
End: 2023-05-16
Payer: MEDICARE

## 2023-05-16 DIAGNOSIS — M79.601 RIGHT ARM PAIN: ICD-10-CM

## 2023-05-16 DIAGNOSIS — M54.12 RADICULOPATHY, CERVICAL: Primary | ICD-10-CM

## 2023-05-16 DIAGNOSIS — M54.2 PAIN, NECK: ICD-10-CM

## 2023-05-16 DIAGNOSIS — Z74.09 IMPAIRED FUNCTIONAL MOBILITY AND ACTIVITY TOLERANCE: ICD-10-CM

## 2023-05-16 NOTE — PROGRESS NOTES
Physical Therapy Daily Treatment Note      Patient: Jed Correia   : 1945  Referring practitioner: KARIS Peng  Date of Initial Visit: Type: THERAPY  Noted: 2023  Today's Date: 2023  Patient seen for 8 sessions       Visit Diagnoses:    ICD-10-CM ICD-9-CM   1. Radiculopathy, cervical  M54.12 723.4   2. Right arm pain  M79.601 729.5   3. Pain, neck  M54.2 723.1   4. Impaired functional mobility and activity tolerance  Z74.09 V49.89       Subjective   No pain. Thumb is numb.    Objective   See Exercise, Manual, and Modality Logs for complete treatment.       Assessment/Plan  Required verbal cuing for new deep neck flexor training. Updated HEP w/ posture strengthening exercises. Progress per POC.    Timed:         Manual Therapy:    0     mins  20004;     Therapeutic Exercise:    13     mins  69407;     Neuromuscular Luis:    10    mins  02687;    Therapeutic Activity:     0     mins  36954;     Gait Trainin     mins  15416;     Ultrasound:     0     mins  52583;    Ionto                               0    mins   98938  Traction 10 mins        Timed Treatment:   23   mins   Total Treatment:     33   mins    Bridgette Christie, PT  [place] License: [id number]

## 2023-05-19 ENCOUNTER — TREATMENT (OUTPATIENT)
Dept: PHYSICAL THERAPY | Facility: CLINIC | Age: 78
End: 2023-05-19
Payer: MEDICARE

## 2023-05-19 DIAGNOSIS — M79.601 RIGHT ARM PAIN: ICD-10-CM

## 2023-05-19 DIAGNOSIS — M54.2 PAIN, NECK: ICD-10-CM

## 2023-05-19 DIAGNOSIS — M54.12 RADICULOPATHY, CERVICAL: Primary | ICD-10-CM

## 2023-05-19 DIAGNOSIS — Z74.09 IMPAIRED FUNCTIONAL MOBILITY AND ACTIVITY TOLERANCE: ICD-10-CM

## 2023-05-19 NOTE — PROGRESS NOTES
"Subjective   History of Present Illness: Jed Correia is a 78 y.o. male is here today for follow-up after having a cervical DAVID that was ordered for right arm pain with numbness in his right thumb, index and middle finger.    Today, Mr. Correia reports he is doing well. He denies any neck and right arm pain and feels very good. He still has numbness in his right thumb only.     History of Present Illness    Tobacco Use: Medium Risk   • Smoking Tobacco Use: Former   • Smokeless Tobacco Use: Never   • Passive Exposure: Not on file        The following portions of the patient's history were reviewed and updated as appropriate: allergies, current medications, past family history, past medical history, past social history, past surgical history and problem list.    Review of Systems    Objective     Vitals:    05/26/23 1011   BP: 148/70   Weight: 94.3 kg (208 lb)   Height: 175.3 cm (69\")     Body mass index is 30.72 kg/m².      Physical Exam  Neurologic Exam    Physical Exam:    CONSTITUTIONAL:  appears well developed, well-nourished and in no acute distress.    NECK: the neck is supple and symmetric. The trachea is midline with no masses.  Good range of motion without pain today    PULMONARY: Respiratory effort is normal with no increased work of breathing or signs of respiratory distress.    CARDIOVASCULAR: Pedal pulses are +2/4 bilaterally. Examination of the extremities shows no edema or varicosities.    MUSCULOSKELETAL: Gait normal    SKIN: The skin is warm, dry and intact.      NEUROLOGIC:   Normal motor strength noted. Muscle bulk and tone are normal.  Sensory exam reveals decreased fine touch in the right thumb only  Reflexes on the right side demonstrates 1/4 Triceps Reflex, 1/4 Biceps Reflex, 0/4 Brachioradialis Reflex on the right.   Reflexes on the left side demonstrates 1/4 Triceps Reflex, 2/4 Biceps Reflex, 1/4 Brachioradialis Reflex on the left.  Spurling's maneuver is negative  Cortical function is intact " and without deficits. Speech is normal.    PSYCHIATRIC: oriented to person, place and time. Patient's mood and affect are normal.    Assessment & Plan   Independent Review of Radiographic Studies:      I personally reviewed the images from the following studies.    MRI of the cervical spine done at [facility] on April 6, 2023 reveals right-sided disc herniation at C5-C6.  There is also smaller right-sided disc osteophyte complex at C6-C7 with only mild spinal and neuroforaminal narrowing.    Medical Decision Making:      Patient showed good result from epidural and physical therapy without any persistent radicular pain.  He does still have some numbness in the C6 distribution but that is always the last thing to improve even with the surgical result.  Therefore we will continue to monitor his course by having a follow-up visit with him in 6 weeks to make sure that his symptoms can be maintained outside the supervision of physical therapy.    Return in about 6 weeks (around 7/7/2023).    Diagnoses and all orders for this visit:    1. Herniation of cervical intervertebral disc with radiculopathy (Primary)             Andres Cagle MD FACS [facility]  Neurological Surgery

## 2023-05-19 NOTE — PROGRESS NOTES
Discharge Summary  Discharge Summary from Physical Therapy Report    Patient Information  Jed Correia  1945    Dates  PT visit: 4/11/23-5/19/23  Number of Visits: 9     Discharge Status of Patient: See Note dated 5/19/23    Goals: All Met    Visit Diagnoses:    ICD-10-CM ICD-9-CM   1. Radiculopathy, cervical  M54.12 723.4   2. Right arm pain  M79.601 729.5   3. Pain, neck  M54.2 723.1   4. Impaired functional mobility and activity tolerance  Z74.09 V49.89       Discharge Plan: Continue with current home exercise program as instructed    Comments     Date of Discharge 5/19/23        Bridgette Christie, PT  Physical Therapist

## 2023-05-19 NOTE — PROGRESS NOTES
Physical Therapy Daily Treatment Note      Patient: Jed Correia   : 1945  Referring practitioner: KARIS Peng  Date of Initial Visit: Type: THERAPY  Noted: 2023  Today's Date: 2023  Patient seen for 9 sessions       Visit Diagnoses:    ICD-10-CM ICD-9-CM   1. Radiculopathy, cervical  M54.12 723.4   2. Right arm pain  M79.601 729.5   3. Pain, neck  M54.2 723.1   4. Impaired functional mobility and activity tolerance  Z74.09 V49.89       Subjective   No complaints.    Objective   See Exercise, Manual, and Modality Logs for complete treatment.     Assessment/Plan  No complaints of neck or arm pain. Demonstrates cervical AROM WNL. Plan to DC to [facility] at this time.    Timed:         Manual Therapy:    0     mins  62206;     Therapeutic Exercise:    25     mins  38690;     Neuromuscular Luis:    0    mins  12526;    Therapeutic Activity:     0     mins  69751;     Gait Trainin     mins  93698;     Ultrasound:     0     mins  91386;    Ionto                               0    mins   00345  Traction 10 mins        Timed Treatment:   25   mins   Total Treatment:     35   mins    Bridgette Christie, PT  [place] License: [id number]

## 2023-05-24 ENCOUNTER — TELEPHONE (OUTPATIENT)
Dept: NEUROSURGERY | Facility: CLINIC | Age: 78
End: 2023-05-24
Payer: MEDICARE

## 2023-05-24 NOTE — TELEPHONE ENCOUNTER
Patient wife called Celeste stating she got a call and wanting to to verify the appointment status. I informed her patients appointment 5/25/23 was rescheduled to 5/26/23 @ 10 due to Dr. Cagle having meeting and needed patient reschedule. Celeste understood

## 2023-05-26 ENCOUNTER — OFFICE VISIT (OUTPATIENT)
Dept: NEUROSURGERY | Facility: CLINIC | Age: 78
End: 2023-05-26
Payer: MEDICARE

## 2023-05-26 VITALS
BODY MASS INDEX: 30.81 KG/M2 | WEIGHT: 208 LBS | SYSTOLIC BLOOD PRESSURE: 148 MMHG | HEIGHT: 69 IN | DIASTOLIC BLOOD PRESSURE: 70 MMHG

## 2023-05-26 DIAGNOSIS — M50.10 HERNIATION OF CERVICAL INTERVERTEBRAL DISC WITH RADICULOPATHY: Primary | ICD-10-CM

## 2023-05-26 PROCEDURE — 99213 OFFICE O/P EST LOW 20 MIN: CPT | Performed by: NEUROLOGICAL SURGERY

## 2023-06-12 NOTE — PROGRESS NOTES
[facility]  New Problem      Patient Name: Jed Correia  : 1945  Primary Care Physician: Myke Landaevrde MD        Chief Complaint:  Right knee pain    HPI:   Jed Correia is a 78 y.o. year old who presents today for evaluation of right knee pain. Onset was about 2 weeks ago. Going through a bath remodel and had to move items around the house in preparation, he was up and down steps 30 times a day. Really started having a lot of pain medial and anterior. Symptoms have not really subsided much despite tylenol, rest and conservative care. He does have history of L knee pain as well which we have injected in the past.    Tylenol for pain. On plavix.      Past Medical/Surgical, Social and Family History:  I have reviewed and/or updated pertinent history as noted in the medical record including:  Past Medical History:   Diagnosis Date   • Arthritis    • Arthritis of neck    • Cancer     COLON, PROSTATE, SKIN   • Cervical disc disorder    • H/O arthroscopy of left knee    • H/O arthroscopy of right knee    • Hyperlipidemia    • Hypertension    • Positional sleep apnea 2019    Home sleep study.  AHI normal at 2/h for total monitoring time.  When supine, mildly abnormal at 6.4 events per hour.  No sleep-related hypoxia.   • Tear of meniscus of knee    • Thoracic disc disorder      Past Surgical History:   Procedure Laterality Date   • CARDIAC SURGERY      STENT   • COLON SURGERY      COLON RESECTION, COLON CANCER REMOVAL   • COLONOSCOPY N/A 2018    Procedure: COLONOSCOPY TO CECUM/TI WITH POLYPECTOMY ( COLD BX);  Surgeon: Moise Garcia MD;  Location: [facility] ENDOSCOPY;  Service: Gastroenterology   • EYE SURGERY      CATARACTS, MACULAR HOLE REPAIR   • KNEE ARTHROSCOPY Right 2020    Procedure: KNEE ARTHROSCOPY, PARTIAL MEDIAL AND LATERAL MENISECTOMY, AND DEBRIDEMENT OF ARTHRITIS;  Surgeon: Mercedes Falk MD;  Location[place] [facility] [place];  Service: Orthopedics   • KNEE SURGERY     •  PROSTATE SURGERY      CANCER   • SKIN BIOPSY      5X-BASAL CELL CARCINOMA     Social History     Occupational History   • Not on file   Tobacco Use   • Smoking status: Former     Packs/day: 0.50     Years: 15.00     Pack years: 7.50     Types: Cigarettes, Pipe     Start date: 1960     Quit date: 1978     Years since quittin.4   • Smokeless tobacco: Never   • Tobacco comments:     Light smoker during this period   Vaping Use   • Vaping Use: Never used   Substance and Sexual Activity   • Alcohol use: Yes     Alcohol/week: 2.0 standard drinks     Types: 2 Cans of beer per week     Comment: 2 drinks at social [employer]   • Drug use: No   • Sexual activity: Not Currently     Partners: Female     Comment: Prostate removal nerve damage.          Allergies:   Allergies   Allergen Reactions   • Bacitracin-Polymyxin B Unknown - Low Severity   • Neomycin-Bacitracin Zn-Polymyx Unknown - Low Severity   • Iodine Rash   • Latex Rash       Medications:   Home Medications:  Current Outpatient Medications on File Prior to Visit   Medication Sig   • aspirin 81 MG chewable tablet Chew 1 tablet Daily.   • atorvastatin (LIPITOR) 10 MG tablet Take 1 tablet by mouth Daily.   • Cholecalciferol (VITAMIN D-3) 1000 UNITS capsule Take 2,000 Units by mouth Daily.   • clopidogrel (PLAVIX) 75 MG tablet Take 1 tablet by mouth Daily.   • Coenzyme Q10 (CO Q 10) 10 MG capsule Take 1 tablet by mouth Daily.   • desonide (DESOWEN) 0.05 % lotion Apply 1 application topically to the appropriate area as directed 2 (Two) Times a Day.   • ezetimibe (ZETIA) 10 MG tablet Take 1 tablet by mouth Daily.   • folic acid (FOLVITE) 1 MG tablet Take 1 tablet by mouth Daily.   • glucosamine-chondroitin 500-400 MG capsule capsule Take 2 capsules by mouth Daily.   • metoprolol succinate XL (TOPROL-XL) 25 MG 24 hr tablet TAKE 0.5 TABLET BY MOUTH DAILY. HOLD FOR HEART RATE LESS THAN 60 BPM   • Multiple Vitamins-Minerals (CENTRUM SILVER ADULT 50+ PO) Take 1  tablet by mouth Daily.   • nitroglycerin (NITROSTAT) 0.4 MG SL tablet Place 1 tablet under the tongue Every 5 (Five) Minutes As Needed.   • Omega-3 Fatty Acids (FISH OIL) 1000 MG capsule capsule Take 1 capsule by mouth Daily With Breakfast.   • pantoprazole (PROTONIX) 40 MG EC tablet Take 1 tablet by mouth Daily.   • Probiotic Product (PROBIOTIC DAILY PO) Take 1 capsule by mouth Daily.   • ramipril (ALTACE) 10 MG capsule Take 1 capsule by mouth Daily.   • venlafaxine (EFFEXOR) 75 MG tablet Take 1 tablet by mouth Daily.   • vitamin B-12 (CYANOCOBALAMIN) 100 MCG tablet Take 50 mcg by mouth Daily.   • vitamin B-6 (PYRIDOXINE) 50 MG tablet Take 1 tablet by mouth Daily.     No current facility-administered medications on file prior to visit.         ROS:  14 point review of systems was negative except as listed in the HPI.    Physical Exam:   78 y.o. male  Body mass index is 31.22 kg/m²., 95.9 kg (211 lb 6.4 oz)  Vitals:    06/13/23 0953   Temp: 97.6 °F (36.4 °C)     General: Alert, cooperative, appears well and in no observable distress. Appears stated age and BMI as listed above.  HEENT: Normocephalic, atraumatic on external visual inspection.  CV: No significant peripheral edema.  Respiratory: Normal respiratory effort.  Skin: Warm & well perfused; appropriate skin turgor.  Psych: Appropriate mood & affect.  Neuro: Gross sensation and motor intact in affected extremity/extremities.  Vascular: Peripheral pulses palpable in affected extremity/extremities.     MSK Exam:  Right Knee  No deformity or wounds appreciated. No significant redness or warmth.  Trace effusion noted  Tenderness along the joint line appreciated medial compartment, patellofemoral compartment  ROM 2-120 with pain at terminal motion. +crepitus  Ligamentous exam grossly stable  Quad strength 4-4+/5    Left Knee  No deformity or wounds appreciated. No significant redness or warmth.  No significant effusion noted.  No significant tenderness appreciated  about the joint.  ROM 0-130 and painless.  Ligamentous exam grossly stable  Quad strength 4+ to 5/5    Brief hip exam in the affected extremity(ies) grossly unremarkable.  Moves ankle and toes up and down, no significant pain or swelling in the foot, ankle or calf.       Radiology:    The following X-rays were ordered/reviewed today to evaluate the patient's symptoms: Single Knee: AP standing and sunrise views of both knees, and lateral view of painful knee show medial compartment arthrosis with significant joint space narrowing. He has some patellofemoral changes as well. We have prior L knee films for comparison and looking at the AP view from 1 year ago there is progressive medial compartment narrowing noted on the R knee..No obvious acute pathology otherwise. Some subtle vascular calcifications noted.    Procedure:   See Procedure Note: The potential risks and benefits of performing a diagnostic and therapeutic injection were discussed with the patient prior to procedure. Risks include, but are not limited to infection, swelling, transient increase in pain, bleeding, bruising. Patient was advised that injections are a diagnostic and therapeutic tool meaning they may not alleviate symptoms at all, or may only provide partial or temporary relief. Injection precautions and aftercare discussed. and Patient is currently on anticoagulation and/or a blood thinning agent which poses an increased risk of more significant bleeding or bruising following an injection. While joint injections are generally well tolerated even on AC, the patient was counseled on this increased risk and advised to monitor for signs of bleeding with proper follow up instructions. Injection precautions and aftercare discussed.    [facility] [place]. Data/Labs: N/A    Assessment & Plan:    ICD-10-CM ICD-9-CM   1. Acute pain of right knee  M25.561 719.46   2. Primary osteoarthritis of right knee  M17.11 715.16     No orders of the defined types were placed in  this encounter.    Orders Placed This Encounter   Procedures   • Large Joint Arthrocentesis: R knee   • XR Knee 3 View Right     This is a 78 year old make with acute R knee pain. He has pretty significant medial compartment changes but his symptoms seem more patellofemoral today. Certainly going up and down the stairs more than usual for moving could exacerbate this. Plan is to proceed with an injection as a diagnostic tool. I would recommend he continue with ice, topicals, tylenol as needed. Could wear a gentle brace or sleeve PRN. I will give him a HEP for some gentle stretching and strengthening. If he is not improving in 2-4 weeks I would like him to give us a call.     Return if symptoms worsen or fail to improve.    Patient encouraged to call with questions or concerns prior to follow up.  Recommend ICE and/or HEAT PRN as discussed.  Will discuss with attending physician as needed.  Consider additional referrals, work up and/or advanced imaging as indicated or if patient fails to respond to conservative care.        KARIS Sue    Large Joint Arthrocentesis: R knee  Date/Time: 6/13/2023 10:21 AM  Consent given by: patient  Site marked: site marked  Timeout: Immediately prior to procedure a time out was called to verify the correct patient, procedure, equipment, support staff and site/side marked as required   Supporting Documentation  Indications: pain and joint swelling   Procedure Details  Location: knee - R knee  Preparation: Patient was prepped and draped in the usual sterile fashion  Needle gauge: 21 G.  Approach: anterolateral  Medications administered: 2 mL lidocaine PF 1% 1 %; 80 mg methylPREDNISolone acetate 80 MG/ML  Patient tolerance: patient tolerated the procedure well with no immediate complications

## 2023-06-13 ENCOUNTER — OFFICE VISIT (OUTPATIENT)
Dept: ORTHOPEDIC SURGERY | Facility: CLINIC | Age: 78
End: 2023-06-13
Payer: MEDICARE

## 2023-06-13 VITALS — HEIGHT: 69 IN | BODY MASS INDEX: 31.31 KG/M2 | TEMPERATURE: 97.6 F | WEIGHT: 211.4 LBS

## 2023-06-13 DIAGNOSIS — M17.11 PRIMARY OSTEOARTHRITIS OF RIGHT KNEE: ICD-10-CM

## 2023-06-13 DIAGNOSIS — M25.561 ACUTE PAIN OF RIGHT KNEE: Primary | ICD-10-CM

## 2023-06-13 RX ADMIN — METHYLPREDNISOLONE ACETATE 80 MG: 80 INJECTION, SUSPENSION INTRA-ARTICULAR; INTRALESIONAL; INTRAMUSCULAR; SOFT TISSUE at 10:21

## 2023-06-13 RX ADMIN — LIDOCAINE HYDROCHLORIDE 2 ML: 10 INJECTION, SOLUTION EPIDURAL; INFILTRATION; INTRACAUDAL; PERINEURAL at 10:21

## 2023-06-15 RX ORDER — METHYLPREDNISOLONE ACETATE 80 MG/ML
80 INJECTION, SUSPENSION INTRA-ARTICULAR; INTRALESIONAL; INTRAMUSCULAR; SOFT TISSUE
Status: COMPLETED | OUTPATIENT
Start: 2023-06-13 | End: 2023-06-13

## 2023-06-15 RX ORDER — LIDOCAINE HYDROCHLORIDE 10 MG/ML
2 INJECTION, SOLUTION EPIDURAL; INFILTRATION; INTRACAUDAL; PERINEURAL
Status: COMPLETED | OUTPATIENT
Start: 2023-06-13 | End: 2023-06-13

## 2023-07-25 ENCOUNTER — TELEPHONE (OUTPATIENT)
Dept: ORTHOPEDIC SURGERY | Facility: CLINIC | Age: 78
End: 2023-07-25

## 2023-08-14 NOTE — PROGRESS NOTES
[facility]              Follow Up      Patient Name: Jed Correia  : 1945  Primary Care Physician: Myke Landaverde MD        Chief Complaint: Right knee pain    HPI:   Jed Correia is a 78 y.o. year old who presents today for evaluation of right knee pain.  I last saw the patient approximately 9 weeks ago with complaints of right knee pain.  He said that he was doing a lot of activities related to a bathroom remodel was going up and down steps quite a bit moving items.  He initially tried some Tylenol, rest and conservative care without much improvement in symptoms.  We did a cortisone injection 9 weeks ago he says is not really sure he got a whole lot of extra relief out of that.  He still has pretty significant anterior knee pain that is worse when going from seated to standing.  He says walking around is fine once he is up and moving.  No other new events or injuries since I last saw him otherwise.      Past Medical/Surgical, Social and Family History:  I have reviewed and/or updated pertinent history as noted in the medical record including:  Past Medical History:   Diagnosis Date    Arthritis     Arthritis of neck     Cancer     COLON, PROSTATE, SKIN    Cervical disc disorder     H/O arthroscopy of left knee     H/O arthroscopy of right knee     Hyperlipidemia     Hypertension     Positional sleep apnea 2019    Home sleep study.  AHI normal at 2/h for total monitoring time.  When supine, mildly abnormal at 6.4 events per hour.  No sleep-related hypoxia.    Tear of meniscus of knee     Thoracic disc disorder      Past Surgical History:   Procedure Laterality Date    CARDIAC SURGERY      STENT    COLON SURGERY      COLON RESECTION, COLON CANCER REMOVAL    COLONOSCOPY N/A 2018    Procedure: COLONOSCOPY TO CECUM/TI WITH POLYPECTOMY ( COLD BX);  Surgeon: Moise Garcia MD;  Location: [facility] ENDOSCOPY;  Service: Gastroenterology    EYE SURGERY      CATARACTS, MACULAR HOLE REPAIR     KNEE ARTHROSCOPY Right 2020    Procedure: KNEE ARTHROSCOPY, PARTIAL MEDIAL AND LATERAL MENISECTOMY, AND DEBRIDEMENT OF ARTHRITIS;  Surgeon: Mercedes Falk MD;  [place] [facility] [place] [facility] [place]  Service: [place]opedics    KNEE SURGERY      PROSTATE SURGERY      CANCER    SKIN BIOPSY      5X-BASAL CELL CARCINOMA     Social History     Occupational History    Not on file   Tobacco Use    Smoking status: Former     Packs/day: 0.50     Years: 15.00     Pack years: 7.50     Types: Cigarettes, Pipe     Start date: 1960     Quit date: 1978     Years since quittin.6    Smokeless tobacco: Never    Tobacco comments:     Light smoker during this period   Vaping Use    Vaping Use: Never used   Substance and Sexual Activity    Alcohol use: Yes     Alcohol/week: 2.0 standard drinks     Types: 2 Cans of beer per week     Comment: 2 drinks at [employer]    Drug use: No    Sexual activity: Not Currently     Partners: Female     Comment: Prostate removal nerve damage.          Allergies:   Allergies   Allergen Reactions    Bacitracin-Polymyxin B Unknown - Low Severity    Iodine Rash    Latex Rash    Neomycin-Bacitracin Zn-Polymyx Unknown - Low Severity    Povidone-Iodine Rash       Medications:   Home Medications:  Current Outpatient Medications on File Prior to Visit   Medication Sig    aspirin 81 MG chewable tablet Chew 1 tablet Daily.    atorvastatin (LIPITOR) 10 MG tablet 1 tab(s) orally once a day (at bedtime) for 30 day(s)    Cholecalciferol (VITAMIN D-3) 1000 UNITS capsule Take 2,000 Units by mouth Daily.    clopidogrel (PLAVIX) 75 MG tablet Take 1 tablet by mouth Daily.    Coenzyme Q10 (CO Q 10) 10 MG capsule Take 1 tablet by mouth Daily.    desonide (DESOWEN) 0.05 % lotion Apply 1 application topically to the appropriate area as directed 2 (Two) Times a Day.    ezetimibe (ZETIA) 10 MG tablet Daily.    folic acid (FOLVITE) 1 MG tablet Take 1 tablet by mouth Daily.    glucosamine-chondroitin 500-400 MG  capsule capsule Take 2 capsules by mouth Daily.    metoprolol succinate XL (TOPROL-XL) 25 MG 24 hr tablet Take 0.5mg tablet by mouth daily.  Hold for heart rate less than 60 BPM    Multiple Vitamins-Minerals (CENTRUM SILVER ADULT 50+ PO) Take 1 tablet by mouth Daily.    nitroglycerin (NITROSTAT) 0.4 MG SL tablet Place 1 tablet under the tongue Every 5 (Five) Minutes As Needed.    Omega-3 Fatty Acids (FISH OIL) 1000 MG capsule capsule Take 1 capsule by mouth Daily With Breakfast.    pantoprazole (PROTONIX) 40 MG EC tablet Take 1 tablet by mouth Daily.    Probiotic Product (PROBIOTIC DAILY PO) Take 1 capsule by mouth Daily.    ramipril (ALTACE) 10 MG capsule Take 1 capsule by mouth Daily.    venlafaxine XR (EFFEXOR-XR) 75 MG 24 hr capsule     vitamin B-12 (CYANOCOBALAMIN) 100 MCG tablet Take 0.5 tablets by mouth Daily.    vitamin B-6 (PYRIDOXINE) 50 MG tablet Take 1 tablet by mouth Daily.    atenolol (TENORMIN) 25 MG tablet Daily. (Patient not taking: Reported on 8/15/2023)    atorvastatin (LIPITOR) 10 MG tablet Take 1 tablet by mouth Daily. (Patient not taking: Reported on 8/15/2023)    clopidogrel (PLAVIX) 75 MG tablet Take 1 tablet by mouth Daily. (Patient not taking: Reported on 8/15/2023)    clopidogrel (PLAVIX) 75 MG tablet Daily. (Patient not taking: Reported on 8/15/2023)    ezetimibe (ZETIA) 10 MG tablet Take 1 tablet by mouth Daily. (Patient not taking: Reported on 8/15/2023)    gentamicin (GARAMYCIN) 0.1 % cream APPLY TWICE DAILY TO RASH ON GROIN AND BUTTOCKS (Patient not taking: Reported on 8/15/2023)    pantoprazole (PROTONIX) 40 MG injection Daily. (Patient not taking: Reported on 8/15/2023)    ramipril (ALTACE) 10 MG capsule Take 1 capsule by mouth Daily. (Patient not taking: Reported on 8/15/2023)    venlafaxine (EFFEXOR) 75 MG tablet Take 1 tablet by mouth Daily. (Patient not taking: Reported on 8/15/2023)    venlafaxine (EFFEXOR) 75 MG tablet Daily. (Patient not taking: Reported on 8/15/2023)     No  current facility-administered medications on file prior to visit.         ROS:  ROS negative except as listed in the HPI.    Physical Exam:   78 y.o. male  Body mass index is 31.56 kg/mý., 97 kg (213 lb 12.8 oz)  Vitals:    08/15/23 0939   Temp: 97.4 øF (36.3 øC)     General: Alert, cooperative, appears well and in no observable distress. Appears stated age and BMI as listed above.  HEENT: Normocephalic, atraumatic on external visual inspection.  CV: No significant peripheral edema.  Respiratory: Normal respiratory effort.  Skin: Warm & well perfused; appropriate skin turgor.  Psych: Appropriate mood & affect.  Neuro: Gross sensation and motor intact in affected extremity/extremities.  Vascular: Peripheral pulses palpable in affected extremity/extremities.     MSK Exam:  Right Knee  No deformity or wounds appreciated. No significant redness or warmth.  Trace effusion noted  Tenderness along the joint line appreciated medial compartment, patellofemoral compartment  ROM 2-120 with pain at terminal motion. +crepitus  Ligamentous exam grossly stable  Quad strength 4-4+/5     Left Knee  No deformity or wounds appreciated. No significant redness or warmth.  No significant effusion noted.  No significant tenderness appreciated about the joint.  ROM 0-130 and painless.  Ligamentous exam grossly stable  Quad strength 4+ to 5/5     Brief hip exam in the affected extremity(ies) grossly unremarkable.  Moves ankle and toes up and down, no significant pain or swelling in the foot, ankle or calf.         Radiology:    No new images were needed at the visit today.     Procedure:   N/A      Misc. Data/Labs: N/A    Assessment & Plan:    ICD-10-CM ICD-9-CM   1. Primary osteoarthritis of right knee  M17.11 715.16   2. Pain of right patellofemoral joint  M25.561 719.46     No orders of the defined types were placed in this encounter.    Orders Placed This Encounter   Procedures    Ambulatory Referral to Physical Therapy Evaluate and  treat    Visco Treatment     This is a 78-year-old male with ongoing right knee pain.  He does have evidence of medial and patellofemoral compartment arthritis on imaging from 9 weeks ago.  Really think his symptoms are more related to his patellofemoral joint where we noted some cystic changes in the femur and small bone spurs.  We talked about different treatment options including adding some physical therapy to work on quad and core strengthening.  I think considering a gel injection could also be helpful.  Is really a little too soon to inject his knee with more cortisone at this point.  We talked about adding topical Voltaren gel as another method to help control symptoms.    Our plan for now will be to have him see physical therapy to work on his strengthening.  He will get some over-the-counter Voltaren gel and I will plan on seeing him back in about 4 weeks.  We will get a PA for Visco and consider doing another cortisone or Visco injection at that time.  Follows fails and continues to have symptoms we might check an MRI just to be sure were not missing something else here but he is really not quite ready for anything surgical so we will try and continue treating this conservatively for now.    Return in about 4 weeks (around 9/12/2023) for Recheck. If symptoms change call for sooner appt., Injection with KARIS Peng.    Patient encouraged to call with questions or concerns prior to follow up.  Recommend ICE and/or HEAT PRN as discussed.  Will discuss with attending physician as needed.  Consider additional referrals, work up and/or advanced imaging as indicated or if patient fails to respond to conservative care.        KARIS Sue      Dictated Utilizing Dragon Dictation

## 2023-08-15 ENCOUNTER — OFFICE VISIT (OUTPATIENT)
Dept: ORTHOPEDIC SURGERY | Facility: CLINIC | Age: 78
End: 2023-08-15
Payer: MEDICARE

## 2023-08-15 ENCOUNTER — TELEPHONE (OUTPATIENT)
Dept: ORTHOPEDIC SURGERY | Facility: CLINIC | Age: 78
End: 2023-08-15

## 2023-08-15 VITALS — BODY MASS INDEX: 31.67 KG/M2 | WEIGHT: 213.8 LBS | TEMPERATURE: 97.4 F | HEIGHT: 69 IN

## 2023-08-15 DIAGNOSIS — M25.561 PAIN OF RIGHT PATELLOFEMORAL JOINT: ICD-10-CM

## 2023-08-15 DIAGNOSIS — M17.11 PRIMARY OSTEOARTHRITIS OF RIGHT KNEE: Primary | ICD-10-CM

## 2023-08-15 RX ORDER — GENTAMICIN SULFATE 1 MG/G
CREAM TOPICAL
COMMUNITY
Start: 2023-08-11

## 2023-08-15 RX ORDER — RAMIPRIL 10 MG/1
1 CAPSULE ORAL DAILY
COMMUNITY
Start: 2023-06-26

## 2023-08-15 RX ORDER — VENLAFAXINE 75 MG/1
TABLET ORAL EVERY 24 HOURS
COMMUNITY

## 2023-08-15 RX ORDER — EZETIMIBE 10 MG/1
TABLET ORAL EVERY 24 HOURS
COMMUNITY

## 2023-08-15 RX ORDER — CLOPIDOGREL BISULFATE 75 MG/1
TABLET ORAL EVERY 24 HOURS
COMMUNITY

## 2023-08-15 RX ORDER — VENLAFAXINE HYDROCHLORIDE 75 MG/1
CAPSULE, EXTENDED RELEASE ORAL
Status: ON HOLD | COMMUNITY
Start: 2023-06-27 | End: 2023-08-21

## 2023-08-15 RX ORDER — CLOPIDOGREL BISULFATE 75 MG/1
1 TABLET ORAL DAILY
Status: ON HOLD | COMMUNITY
Start: 2023-06-26 | End: 2023-08-21

## 2023-08-15 RX ORDER — PANTOPRAZOLE SODIUM 40 MG/10ML
INJECTION, POWDER, LYOPHILIZED, FOR SOLUTION INTRAVENOUS EVERY 24 HOURS
COMMUNITY

## 2023-08-15 RX ORDER — ATORVASTATIN CALCIUM 10 MG/1
TABLET, FILM COATED ORAL
COMMUNITY

## 2023-08-15 RX ORDER — ATENOLOL 25 MG/1
TABLET ORAL EVERY 24 HOURS
COMMUNITY
End: 2023-08-17

## 2023-08-15 NOTE — TELEPHONE ENCOUNTER
Provider: SKY  Caller: JASMIN POOLE/  [facility]  Phone Number: [phone]  Reason for Call: JASMIN STATED THEY NEED ADDITIONAL INFORMATION ON PATIENT. PLEASE CALL HER TO DISCUSS.

## 2023-08-21 ENCOUNTER — ANESTHESIA EVENT (OUTPATIENT)
Dept: SURGERY | Facility: SURGERY CENTER | Age: 78
End: 2023-08-21
Payer: MEDICARE

## 2023-08-21 ENCOUNTER — HOSPITAL ENCOUNTER (OUTPATIENT)
Facility: SURGERY CENTER | Age: 78
Setting detail: HOSPITAL OUTPATIENT SURGERY
Discharge: HOME | End: 2023-08-21
Attending: INTERNAL MEDICINE | Admitting: INTERNAL MEDICINE
Payer: MEDICARE

## 2023-08-21 ENCOUNTER — ANESTHESIA (OUTPATIENT)
Dept: SURGERY | Facility: SURGERY CENTER | Age: 78
End: 2023-08-21
Payer: MEDICARE

## 2023-08-21 VITALS
BODY MASS INDEX: 30.44 KG/M2 | RESPIRATION RATE: 16 BRPM | TEMPERATURE: 97.3 F | HEART RATE: 55 BPM | SYSTOLIC BLOOD PRESSURE: 164 MMHG | DIASTOLIC BLOOD PRESSURE: 85 MMHG | WEIGHT: 206.2 LBS | OXYGEN SATURATION: 98 %

## 2023-08-21 DIAGNOSIS — Z12.11 ENCOUNTER FOR SCREENING FOR MALIGNANT NEOPLASM OF COLON: ICD-10-CM

## 2023-08-21 PROCEDURE — 45380 COLONOSCOPY AND BIOPSY: CPT | Performed by: INTERNAL MEDICINE

## 2023-08-21 PROCEDURE — 88305 TISSUE EXAM BY PATHOLOGIST: CPT | Performed by: INTERNAL MEDICINE

## 2023-08-21 PROCEDURE — 45385 COLONOSCOPY W/LESION REMOVAL: CPT | Performed by: INTERNAL MEDICINE

## 2023-08-21 PROCEDURE — 25010000002 PROPOFOL 10 MG/ML EMULSION: Performed by: ANESTHESIOLOGY

## 2023-08-21 RX ORDER — SODIUM CHLORIDE 0.9 % (FLUSH) 0.9 %
10 SYRINGE (ML) INJECTION AS NEEDED
Status: DISCONTINUED | OUTPATIENT
Start: 2023-08-21 | End: 2023-08-21 | Stop reason: HOSPADM

## 2023-08-21 RX ORDER — SODIUM CHLORIDE, SODIUM LACTATE, POTASSIUM CHLORIDE, CALCIUM CHLORIDE 600; 310; 30; 20 MG/100ML; MG/100ML; MG/100ML; MG/100ML
30 INJECTION, SOLUTION INTRAVENOUS CONTINUOUS PRN
Status: DISCONTINUED | OUTPATIENT
Start: 2023-08-21 | End: 2023-08-21 | Stop reason: HOSPADM

## 2023-08-21 RX ORDER — PROPOFOL 10 MG/ML
VIAL (ML) INTRAVENOUS AS NEEDED
Status: DISCONTINUED | OUTPATIENT
Start: 2023-08-21 | End: 2023-08-21 | Stop reason: SURG

## 2023-08-21 RX ORDER — ONDANSETRON 2 MG/ML
4 INJECTION INTRAMUSCULAR; INTRAVENOUS ONCE AS NEEDED
Status: DISCONTINUED | OUTPATIENT
Start: 2023-08-21 | End: 2023-08-21 | Stop reason: HOSPADM

## 2023-08-21 RX ORDER — LIDOCAINE HYDROCHLORIDE 20 MG/ML
INJECTION, SOLUTION INFILTRATION; PERINEURAL AS NEEDED
Status: DISCONTINUED | OUTPATIENT
Start: 2023-08-21 | End: 2023-08-21 | Stop reason: SURG

## 2023-08-21 RX ORDER — SODIUM CHLORIDE 0.9 % (FLUSH) 0.9 %
3 SYRINGE (ML) INJECTION EVERY 12 HOURS SCHEDULED
Status: DISCONTINUED | OUTPATIENT
Start: 2023-08-21 | End: 2023-08-21 | Stop reason: HOSPADM

## 2023-08-21 RX ORDER — PROPOFOL 10 MG/ML
VIAL (ML) INTRAVENOUS CONTINUOUS PRN
Status: DISCONTINUED | OUTPATIENT
Start: 2023-08-21 | End: 2023-08-21 | Stop reason: SURG

## 2023-08-21 RX ADMIN — LIDOCAINE HYDROCHLORIDE 50 MG: 20 INJECTION, SOLUTION INFILTRATION; PERINEURAL at 09:33

## 2023-08-21 RX ADMIN — PROPOFOL 70 MG: 10 INJECTION, EMULSION INTRAVENOUS at 09:33

## 2023-08-21 RX ADMIN — Medication 160 MCG/KG/MIN: at 09:33

## 2023-08-21 RX ADMIN — SODIUM CHLORIDE, POTASSIUM CHLORIDE, SODIUM LACTATE AND CALCIUM CHLORIDE 30 ML/HR: 600; 310; 30; 20 INJECTION, SOLUTION INTRAVENOUS at 08:22

## 2023-08-21 NOTE — H&P
No chief complaint on file.      HPI  Patient today for a colonoscopy.  He has a personal history of colon cancer and colon polyps.         Problem List:    Patient Active Problem List   Diagnosis    Positional sleep apnea    Hx of colonic polyps    FH: colon cancer    FH: colon polyps    Hx of malignant neoplasm of colon    Positional sleep apnea    Tear of medial meniscus of right knee, current    Encounter for screening for malignant neoplasm of colon    Herniation of cervical intervertebral disc with radiculopathy       Medical History:    Past Medical History:   Diagnosis Date    Arthritis     Arthritis of neck     Cancer     COLON, PROSTATE, SKIN    Cervical disc disorder     Coronary artery disease     H/O arthroscopy of left knee     H/O arthroscopy of right knee     Hyperlipidemia     Hypertension     Positional sleep apnea 2019    Home sleep study.  AHI normal at 2/h for total monitoring time.  When supine, mildly abnormal at 6.4 events per hour.  No sleep-related hypoxia.    Tear of meniscus of knee     Thoracic disc disorder         Social History:    Social History     Socioeconomic History    Marital status:    Tobacco Use    Smoking status: Former     Packs/day: 0.50     Years: 15.00     Pack years: 7.50     Types: Cigarettes, Pipe     Start date: 1960     Quit date: 1978     Years since quittin.6    Smokeless tobacco: Never    Tobacco comments:     Light smoker during this period   Vaping Use    Vaping Use: Never used   Substance and Sexual Activity    Alcohol use: Yes     Alcohol/week: 2.0 standard drinks     Types: 2 Cans of beer per week     Comment: 2 drinks at [employer]    Drug use: No    Sexual activity: Not Currently     Partners: Female     Comment: Prostate removal nerve damage.       Family History:   Family History   Problem Relation Age of Onset    Cancer Sister     Cancer Brother     Alcohol abuse Brother     Hypertension Brother     ALS Brother     Brayan  Hyperthermia Neg Hx        Surgical History:   Past Surgical History:   Procedure Laterality Date    CARDIAC CATHETERIZATION  2014    stents    COLON SURGERY      COLON RESECTION, COLON CANCER REMOVAL    COLONOSCOPY N/A 08/17/2018    Procedure: COLONOSCOPY TO CECUM/TI WITH POLYPECTOMY ( COLD BX);  Surgeon: Moise Garcia MD;  Location: [facility] ENDOSCOPY;  Service: Gastroenterology    EYE SURGERY      CATARACTS, MACULAR HOLE REPAIR    KNEE ARTHROSCOPY Right 01/13/2020    Procedure: KNEE ARTHROSCOPY, PARTIAL MEDIAL AND LATERAL MENISECTOMY, AND DEBRIDEMENT OF ARTHRITIS;  Surgeon: Mercedes Falk MD;  [place] [facility] [place];  Service: Orthopedics    KNEE SURGERY      PROSTATE SURGERY      CANCER    SKIN BIOPSY      5X-BASAL CELL CARCINOMA         Current Facility-Administered Medications:     lactated ringers infusion, 30 mL/hr, Intravenous, Continuous PRN, Ki Ash MD, Last Rate: 30 mL/hr at 08/21/23 0822, 30 mL/hr at 08/21/23 0822    Allergies:   Allergies   Allergen Reactions    Bacitracin-Polymyxin B Unknown - Low Severity    Iodine Rash    Latex Rash    Neomycin-Bacitracin Zn-Polymyx Unknown - Low Severity    Povidone-Iodine Rash        The following portions of the patient's history were reviewed by me and updated as appropriate: review of systems, allergies, current medications, past family history, past medical history, past social history, past surgical history and problem list.    Vitals:    08/21/23 0816   BP: 165/73   Pulse: 68   Resp: 18   Temp: 96 øF (35.6 øC)   SpO2: 98%       PHYSICAL EXAM:    CONSTITUTIONAL:  today's vital signs reviewed by me  GASTROINTESTINAL: abdomen is soft nontender nondistended with normal active bowel sounds, no masses are appreciated    Assessment/ Plan  We will proceed today with colonoscopy.    Risks and benefits as well as alternatives to endoscopic evaluation were explained to the patient and they voiced understanding and wish to proceed.  These risks  include but are not limited to the risk of bleeding, perforation, adverse reaction to sedation, and missed lesions.  The patient was given the opportunity to ask questions prior to the endoscopic procedure.

## 2023-08-21 NOTE — ANESTHESIA PREPROCEDURE EVALUATION
Anesthesia Evaluation     Patient summary reviewed and Nursing notes reviewed                Airway   Mallampati: II  Dental - normal exam     Pulmonary - normal exam   (+) ,sleep apnea  (-) not a smoker  Cardiovascular - normal exam    ECG reviewed  Patient on routine beta blocker    (+) hypertension, CAD, cardiac stents more than 12 months ago hyperlipidemia    ROS comment: Reviewed last primary care note    Neuro/Psych  (+) numbness  GI/Hepatic/Renal/Endo    (+) obesity  (-) no renal disease, diabetes, no thyroid disorder    Musculoskeletal     Abdominal    Substance History      OB/GYN          Other      history of cancer                  Anesthesia Plan    ASA 3       Anesthetic plan, risks, benefits, and alternatives have been provided, discussed and informed consent has been obtained with: patient.    CODE STATUS:

## 2023-08-21 NOTE — ANESTHESIA POSTPROCEDURE EVALUATION
Patient: Jed Correia    Procedure Summary       Date: 08/21/23 Room / Location: [facility]    Anesthesia Start: 0926 Anesthesia Stop: 0953    Procedure: COLONOSCOPY TO CECUM Diagnosis:       Encounter for screening for malignant neoplasm of colon      (Encounter for screening for malignant neoplasm of colon [Z12.11])    Surgeons: Ki Ash MD Provider: Marla Ware MD    Anesthesia Type: Not recorded ASA Status: 3            Anesthesia Type: No value filed.    Vitals  Vitals Value Taken Time   /85 08/21/23 1011   Temp 36.3 øC (97.3 øF) 08/21/23 0953   Pulse 53 08/21/23 1011   Resp 16 08/21/23 1005   SpO2 98 % 08/21/23 1011   Vitals shown include unvalidated device data.        Post Anesthesia Care and Evaluation    Patient location during evaluation: PHASE II  Patient participation: complete - patient participated  Level of consciousness: awake  Pain management: adequate    Airway patency: patent  Anesthetic complications: No anesthetic complications    Cardiovascular status: acceptable  Respiratory status: acceptable  Hydration status: acceptable    Comments: /85   Pulse 55   Temp 36.3 øC (97.3 øF) (Infrared)   Resp 16   Wt 93.5 kg (206 lb 3.2 oz)   SpO2 98%   BMI 30.44 kg/mý

## 2023-08-22 LAB
LAB AP CASE REPORT: NORMAL
LAB AP CLINICAL INFORMATION: NORMAL
PATH REPORT.FINAL DX SPEC: NORMAL
PATH REPORT.GROSS SPEC: NORMAL

## 2023-09-11 NOTE — PROGRESS NOTES
[facility]              Follow Up      Patient Name: Jed Correia  : 1945  Primary Care Physician: Myke Landaverde MD        Chief Complaint: Right knee pain    HPI:   Jed Correia is a 78 y.o. year old who presents today for evaluation of right knee pain. Is feeling better but getting up out of a chair is still hard, steps are hard. Walking and standing are better.  He is here for Monovisc injection today.  He did complete several visits with physical therapy overall he has found that to be helpful.  He does state that he really has not challenged the knee.      Past Medical/Surgical, Social and Family History:  I have reviewed and/or updated pertinent history as noted in the medical record including:  Past Medical History:   Diagnosis Date    Arthritis     Arthritis of neck     Cancer     COLON, PROSTATE, SKIN    Cervical disc disorder     Coronary artery disease     H/O arthroscopy of left knee     H/O arthroscopy of right knee     Hyperlipidemia     Hypertension     Positional sleep apnea 2019    Home sleep study.  AHI normal at 2/h for total monitoring time.  When supine, mildly abnormal at 6.4 events per hour.  No sleep-related hypoxia.    Tear of meniscus of knee     Thoracic disc disorder      Past Surgical History:   Procedure Laterality Date    CARDIAC CATHETERIZATION      stents    COLON SURGERY      COLON RESECTION, COLON CANCER REMOVAL    COLONOSCOPY N/A 2018    Procedure: COLONOSCOPY TO CECUM/TI WITH POLYPECTOMY ( COLD BX);  Surgeon: Moise Garcia MD;  Location: [facility] ENDOSCOPY;  Service: Gastroenterology    COLONOSCOPY N/A 2023    Procedure: COLONOSCOPY TO CECUM;  Surgeon: Ki Ash MD;  Location: [facility] OR;  Service: Gastroenterology;  Laterality: N/A;  POLYPS, HEMORRHOIDS    EYE SURGERY      CATARACTS, MACULAR HOLE REPAIR    KNEE ARTHROSCOPY Right 2020    Procedure: KNEE ARTHROSCOPY, PARTIAL MEDIAL AND LATERAL MENISECTOMY, AND  DEBRIDEMENT OF ARTHRITIS;  Surgeon: Mercedes Falk MD;  [place] [facility] [place];  Service: Orthopedics    KNEE SURGERY      PROSTATE SURGERY      CANCER    SKIN BIOPSY      5X-BASAL CELL CARCINOMA     Social History     Occupational History    Not on file   Tobacco Use    Smoking status: Former     Packs/day: 0.50     Years: 15.00     Pack years: 7.50     Types: Cigarettes, Pipe     Start date: 1960     Quit date: 1978     Years since quittin.7    Smokeless tobacco: Never    Tobacco comments:     Light smoker during this period   Vaping Use    Vaping Use: Never used   Substance and Sexual Activity    Alcohol use: Yes     Alcohol/week: 2.0 standard drinks     Types: 2 Cans of beer per week     Comment: 2 drinks at [employer]    Drug use: No    Sexual activity: Not Currently     Partners: Female     Comment: Prostate removal nerve damage.          Allergies:   Allergies   Allergen Reactions    Bacitracin-Polymyxin B Unknown - Low Severity    Iodine Rash    Latex Rash    Neomycin-Bacitracin Zn-Polymyx Unknown - Low Severity    Povidone-Iodine Rash       Medications:   Home Medications:  Current Outpatient Medications on File Prior to Visit   Medication Sig    aspirin 81 MG chewable tablet Chew 1 tablet Daily.    atorvastatin (LIPITOR) 10 MG tablet 1 tab(s) orally once a day (at bedtime) for 30 day(s)    Cholecalciferol (VITAMIN D-3) 1000 UNITS capsule Take 2,000 Units by mouth Daily.    clopidogrel (PLAVIX) 75 MG tablet Daily.    Coenzyme Q10 (CO Q 10) 10 MG capsule Take 1 tablet by mouth Daily.    desonide (DESOWEN) 0.05 % lotion Apply 1 application topically to the appropriate area as directed 2 (Two) Times a Day.    ezetimibe (ZETIA) 10 MG tablet Daily.    folic acid (FOLVITE) 1 MG tablet Take 1 tablet by mouth Daily.    gentamicin (GARAMYCIN) 0.1 % cream     glucosamine-chondroitin 500-400 MG capsule capsule Take 2 capsules by mouth Daily.    metoprolol succinate XL (TOPROL-XL) 25 MG 24 hr  tablet Take 0.5mg tablet by mouth daily.  Hold for heart rate less than 60 BPM    Multiple Vitamins-Minerals (CENTRUM SILVER ADULT 50+ PO) Take 1 tablet by mouth Daily.    nitroglycerin (NITROSTAT) 0.4 MG SL tablet Place 1 tablet under the tongue Every 5 (Five) Minutes As Needed.    Omega-3 Fatty Acids (FISH OIL) 1000 MG capsule capsule Take 1 capsule by mouth Daily With Breakfast.    pantoprazole (PROTONIX) 40 MG injection Daily.    Probiotic Product (PROBIOTIC DAILY PO) Take 1 capsule by mouth Daily.    ramipril (ALTACE) 10 MG capsule Take 1 capsule by mouth Daily.    venlafaxine (EFFEXOR) 75 MG tablet Daily.    vitamin B-12 (CYANOCOBALAMIN) 100 MCG tablet Take 0.5 tablets by mouth Daily.    vitamin B-6 (PYRIDOXINE) 50 MG tablet Take 1 tablet by mouth Daily.     No current facility-administered medications on file prior to visit.         ROS:  ROS negative except as listed in the HPI.    Physical Exam:   78 y.o. male  Body mass index is 31.19 kg/m²., 95.8 kg (211 lb 3.2 oz)  Vitals:    09/12/23 0947   Temp: 97.3 °F (36.3 °C)     General: Alert, cooperative, appears well and in no observable distress. Appears stated age and BMI as listed above.  HEENT: Normocephalic, atraumatic on external visual inspection.  CV: No significant peripheral edema.  Respiratory: Normal respiratory effort.  Skin: Warm & well perfused; appropriate skin turgor.  Psych: Appropriate mood & affect.  Neuro: Gross sensation and motor intact in affected extremity/extremities.  Vascular: Peripheral pulses palpable in affected extremity/extremities.     MSK Exam:  Exam is unchanged.     Radiology:    No new images were needed at the visit today.     Procedure:     Large Joint Arthrocentesis: R knee  Date/Time: 9/12/2023 9:10 AM  Consent given by: patient  Site marked: site marked  Timeout: Immediately prior to procedure a time out was called to verify the correct patient, procedure, equipment, support staff and site/side marked as required    Supporting Documentation  Indications: pain, joint swelling and diagnostic evaluation   Procedure Details  Location: knee - R knee  Preparation: Patient was prepped and draped in the usual sterile fashion  Needle gauge: 21G.  Approach: anterolateral  Medications administered: 2 mL lidocaine PF 1% 1 %; 88 mg Hyaluronan 88 MG/4ML  Patient tolerance: patient tolerated the procedure well with no immediate complications           Misc. Data/Labs: N/A    Assessment & Plan:    ICD-10-CM ICD-9-CM   1. Primary osteoarthritis of right knee  M17.11 715.16   2. Pain of right patellofemoral joint  M25.561 719.46     No orders of the defined types were placed in this encounter.    Orders Placed This Encounter   Procedures    Large Joint Arthrocentesis: R knee     This is a 78-year-old male with right knee pain and really suspect this is degenerative knee pain and more specifically patellofemoral arthritis.  He is improving albeit slowly.  I think a Visco injection is more than reasonable as we discussed at the last visit.  He has an upcoming trip in February we may consider doing another cortisone injection before then.  If he fails to get adequate relief or his symptoms return or worsen we may consider checking an MRI.  I encouraged him to continue doing his home exercises.    Return in about 6 weeks (around 10/24/2023).    Patient encouraged to call with questions or concerns prior to follow up.  Recommend ICE and/or HEAT PRN as discussed.  Will discuss with attending physician as needed.  Consider additional referrals, work up and/or advanced imaging as indicated or if patient fails to respond to conservative care.        Tylor Lynch, KARIS      Dictated Utilizing Dragon Dictation

## 2023-09-12 ENCOUNTER — OFFICE VISIT (OUTPATIENT)
Dept: ORTHOPEDIC SURGERY | Facility: CLINIC | Age: 78
End: 2023-09-12
Payer: MEDICARE

## 2023-09-12 VITALS — WEIGHT: 211.2 LBS | TEMPERATURE: 97.3 F | BODY MASS INDEX: 31.28 KG/M2 | HEIGHT: 69 IN

## 2023-09-12 DIAGNOSIS — M17.11 PRIMARY OSTEOARTHRITIS OF RIGHT KNEE: ICD-10-CM

## 2023-09-12 DIAGNOSIS — M25.561 PAIN OF RIGHT PATELLOFEMORAL JOINT: ICD-10-CM

## 2023-09-12 RX ORDER — LIDOCAINE HYDROCHLORIDE 10 MG/ML
2 INJECTION, SOLUTION EPIDURAL; INFILTRATION; INTRACAUDAL; PERINEURAL
Status: COMPLETED | OUTPATIENT
Start: 2023-09-12 | End: 2023-09-12

## 2023-09-12 RX ADMIN — LIDOCAINE HYDROCHLORIDE 2 ML: 10 INJECTION, SOLUTION EPIDURAL; INFILTRATION; INTRACAUDAL; PERINEURAL at 09:10

## 2023-10-12 ENCOUNTER — OFFICE VISIT (OUTPATIENT)
Dept: CARDIOLOGY | Facility: CLINIC | Age: 78
End: 2023-10-12
Payer: MEDICARE

## 2023-10-12 VITALS
DIASTOLIC BLOOD PRESSURE: 80 MMHG | SYSTOLIC BLOOD PRESSURE: 158 MMHG | HEART RATE: 69 BPM | WEIGHT: 208.4 LBS | BODY MASS INDEX: 30.87 KG/M2 | HEIGHT: 69 IN | OXYGEN SATURATION: 96 %

## 2023-10-12 DIAGNOSIS — I65.21 CAROTID STENOSIS, ASYMPTOMATIC, RIGHT: ICD-10-CM

## 2023-10-12 DIAGNOSIS — R01.1 HEART MURMUR: ICD-10-CM

## 2023-10-12 DIAGNOSIS — I25.10 CORONARY ARTERY DISEASE INVOLVING NATIVE CORONARY ARTERY OF NATIVE HEART WITHOUT ANGINA PECTORIS: Primary | ICD-10-CM

## 2023-10-12 PROCEDURE — 1159F MED LIST DOCD IN RCRD: CPT | Performed by: INTERNAL MEDICINE

## 2023-10-12 PROCEDURE — 1160F RVW MEDS BY RX/DR IN RCRD: CPT | Performed by: INTERNAL MEDICINE

## 2023-10-12 PROCEDURE — 99214 OFFICE O/P EST MOD 30 MIN: CPT | Performed by: INTERNAL MEDICINE

## 2023-10-12 RX ORDER — CLOPIDOGREL BISULFATE 75 MG/1
75 TABLET ORAL DAILY
Qty: 90 TABLET | Refills: 3 | Status: SHIPPED | OUTPATIENT
Start: 2023-10-12

## 2023-10-12 RX ORDER — EZETIMIBE 10 MG/1
10 TABLET ORAL DAILY
Qty: 90 TABLET | Refills: 3 | Status: SHIPPED | OUTPATIENT
Start: 2023-10-12

## 2023-10-12 RX ORDER — ASCORBIC ACID 1000 MG
1 TABLET ORAL DAILY
Qty: 90 EACH | Refills: 3 | Status: SHIPPED | OUTPATIENT
Start: 2023-10-12

## 2023-10-12 RX ORDER — RAMIPRIL 10 MG/1
10 CAPSULE ORAL DAILY
Qty: 90 CAPSULE | Refills: 3 | Status: SHIPPED | OUTPATIENT
Start: 2023-10-12

## 2023-10-12 RX ORDER — ATORVASTATIN CALCIUM 10 MG/1
10 TABLET, FILM COATED ORAL NIGHTLY
Qty: 90 TABLET | Refills: 3 | Status: SHIPPED | OUTPATIENT
Start: 2023-10-12

## 2023-10-12 RX ORDER — ASPIRIN 81 MG/1
81 TABLET, CHEWABLE ORAL DAILY
Qty: 90 TABLET | Refills: 3 | Status: SHIPPED | OUTPATIENT
Start: 2023-10-12

## 2023-10-12 RX ORDER — NITROGLYCERIN 0.4 MG/1
0.4 TABLET SUBLINGUAL
Qty: 15 TABLET | Refills: 11 | Status: SHIPPED | OUTPATIENT
Start: 2023-10-12

## 2023-10-12 RX ORDER — METOPROLOL SUCCINATE 25 MG/1
TABLET, EXTENDED RELEASE ORAL
Qty: 90 TABLET | Refills: 3 | Status: SHIPPED | OUTPATIENT
Start: 2023-10-12

## 2023-10-12 NOTE — PROGRESS NOTES
[facility]      Patient Name: Jed Correia  :1945  Age: 78 y.o.  Encounter Provider:  Jed Moody Jr, MD      Chief Complaint: Initial evaluation of patient with coronary artery disease      HPI  Jed Correia is a 78 y.o. male past medical history of coronary artery disease status post PCI  and premature atrial contractions who presents for follow-up evaluation.      Last clinic visit note: Patient previously followed by Dr. Flaco Stearns of the [facility].  Intervention in  with no subsequent needs for intervention.  He is a very active gentleman walks about 2 miles or rides the stationary bike for 9 to 10 miles daily.  No chest pain or shortness of air.  No orthopnea, PND or edema.  He has occasional palpitations and irregular heart rate has been a chronic issue for him.  Holter monitor in 2022 showed 15% PACs but no sustained arrhythmias.  He denies dizziness syncope.  He has a history of carotid stenosis with 50 to 69% on the left on ultrasound carotids .  No focal neurological deficits or other neurological complaints.  He is an ex-smoker quit , drinks socially and denies illicit drug use.  History of brother with myocardial infarction followed by CABG.    Doing well since last visit.  He is having bilateral knee pain which decreases exercise frequency but he is getting back into more frequent exercise.  No chest pain or shortness of air with activity.  Moderate carotid stenosis noted on repeat ultrasound last year.  LDL at goal on lipid profile in 2023.  No cardiac complaints at time of interview.    The following portions of the patient's history were reviewed and updated as appropriate: allergies, current medications, past family history, past medical history, past social history, past surgical history and problem list.      Review of Systems   Constitutional: Negative for chills and fever.   HENT:  Negative for hoarse voice  "and sore throat.    Eyes:  Negative for double vision and photophobia.   Cardiovascular:  Positive for palpitations. Negative for chest pain, leg swelling, near-syncope, orthopnea, paroxysmal nocturnal dyspnea and syncope.   Respiratory:  Negative for cough and wheezing.    Skin:  Negative for poor wound healing and rash.   Musculoskeletal:  Negative for arthritis and joint swelling.   Gastrointestinal:  Negative for bloating, abdominal pain, hematemesis and hematochezia.   Neurological:  Negative for dizziness and focal weakness.   Psychiatric/Behavioral:  Negative for depression and suicidal ideas.        OBJECTIVE:   Vital Signs  Vitals:    10/12/23 1101   BP: 158/80   Pulse: 69   SpO2: 96%     Estimated body mass index is 30.78 kg/mý as calculated from the following:    Height as of this encounter: 175.3 cm (69\").    Weight as of this encounter: 94.5 kg (208 lb 6.4 oz).    Vitals reviewed.   Constitutional:       Appearance: Healthy appearance. Not in distress.   Neck:      Vascular: No JVR. JVD normal.   Pulmonary:      Effort: Pulmonary effort is normal.      Breath sounds: Normal breath sounds. No wheezing. No rhonchi. No rales.   Chest:      Chest wall: Not tender to palpatation.   Cardiovascular:      PMI at left midclavicular line. Normal rate. Regular rhythm. Normal S1. Normal S2.       Murmurs: There is a systolic murmur.      No gallop.  No click. No rub.   Pulses:     Intact distal pulses.   Edema:     Peripheral edema absent.   Abdominal:      General: Bowel sounds are normal.      Palpations: Abdomen is soft.      Tenderness: There is no abdominal tenderness.   Musculoskeletal: Normal range of motion.         General: No tenderness. Skin:     General: Skin is warm and dry.   Neurological:      General: No focal deficit present.      Mental Status: Alert and oriented to person, place and time.         Procedures            ASSESSMENT:     CAD without angina  Carotid stenosis asymptomatic  Premature " atrial contractions  Hypertension  Dyslipidemia    PLAN OF CARE:     CAD without angina -currently on dual antiplatelet therapy, statin.  Tolerating medical therapy well.  Good functional capacity with no angina.  Continue to monitor closely.  Last LDL at goal.  Carotid stenosis asymptomatic -repeat lipids.  Will benefit from referral to vascular surgery.  Premature atrial contractions -minimal impact on quality of life.  No sustained arrhythmias on recent Holter.  Monitor clinical progress.  Hypertension -elevated today in clinic.  Sodium restricted diet.  We will make a blood pressure check appointment to validate the accuracy of his monitor.  Twice daily blood pressure log to be sent in after 1 week.  Dyslipidemia -as above  Murmur -I hear a soft murmur in the aortic position.  Check echocardiogram.    Return to clinic 6 months with KARIS Gregorio.             Discharge Medications            Accurate as of October 12, 2023 11:03 AM. If you have any questions, ask your nurse or doctor.                Continue These Medications        Instructions Start Date   aspirin 81 MG chewable tablet   81 mg, Oral, Daily      atorvastatin 10 MG tablet  Commonly known as: LIPITOR   1 tab(s) orally once a day (at bedtime) for 30 day(s)      clopidogrel 75 MG tablet  Commonly known as: PLAVIX   Every 24 Hours      Co Q 10 10 MG capsule   1 tablet, Oral, Daily      desonide 0.05 % lotion  Commonly known as: DESOWEN   1 application , Topical, 2 Times Daily      ezetimibe 10 MG tablet  Commonly known as: ZETIA   Every 24 Hours      fish oil 1000 MG capsule capsule   1,000 mg, Oral, Daily With Breakfast      folic acid 1 MG tablet  Commonly known as: FOLVITE   1 mg, Oral, Daily      gentamicin 0.1 % cream  Commonly known as: GARAMYCIN       glucosamine-chondroitin 500-400 MG capsule capsule   2 capsules, Oral, Daily      metoprolol succinate XL 25 MG 24 hr tablet  Commonly known as: TOPROL-XL   Take 0.5mg tablet by mouth daily.   Hold for heart rate less than 60 BPM      multivitamin with minerals tablet tablet   1 tablet, Oral, Daily      nitroglycerin 0.4 MG SL tablet  Commonly known as: NITROSTAT   0.4 mg, Sublingual, Every 5 Minutes PRN      pantoprazole 40 MG injection  Commonly known as: PROTONIX   Every 24 Hours      PROBIOTIC DAILY PO   1 capsule, Oral, Daily      ramipril 10 MG capsule  Commonly known as: ALTACE   1 capsule, Oral, Daily      venlafaxine 75 MG tablet  Commonly known as: EFFEXOR   Every 24 Hours      vitamin B-12 100 MCG tablet  Commonly known as: CYANOCOBALAMIN   50 mcg, Oral, Daily      vitamin B-6 50 MG tablet  Commonly known as: PYRIDOXINE   50 mg, Oral, Daily      Vitamin D-3 25 MCG (1000 UT) capsule   2,000 Units, Oral, Daily               Thank you for allowing me to participate in the care of your patient,      Sincerely,   Jed Moody MD  [facility]  10/12/23  11:03 EDT

## 2023-10-19 ENCOUNTER — CLINICAL SUPPORT (OUTPATIENT)
Dept: CARDIOLOGY | Facility: CLINIC | Age: 78
End: 2023-10-19
Payer: MEDICARE

## 2023-10-19 VITALS — DIASTOLIC BLOOD PRESSURE: 79 MMHG | SYSTOLIC BLOOD PRESSURE: 154 MMHG | HEART RATE: 64 BPM

## 2023-10-19 DIAGNOSIS — I10 PRIMARY HYPERTENSION: Primary | ICD-10-CM

## 2023-10-19 PROCEDURE — 99212 OFFICE O/P EST SF 10 MIN: CPT | Performed by: INTERNAL MEDICINE

## 2023-10-19 NOTE — PROGRESS NOTES
Patient presents for BP check today.  BP in right arm with our cuff 148/80 HR 58,  left arm our cuff 150/80  HR 58.  Patient's BP with his automatic cuff right arm 142/70 HR 64, Left arm 154/79  HR 64.  Patient has not kept a log of his BP reading as of yet. / LALO Moody reviewed BP readings.  Patient advised his cuff is working well.  He is instructed to log his BP morning and evening for 2 weeks.  He will drop off the log once completed. / LALO     Objective evidence reviewed with CHRISTIAN Judd.  Patient was advised to keep a twice daily blood pressure log to be sent in after 1 week.

## 2023-10-23 NOTE — PROGRESS NOTES
[facility]              Follow Up      Patient Name: Jed Correia  : 1945  Primary Care Physician: Myke Landaverde MD        Chief Complaint:  Bilateral knee pain    HPI:   Jed Correia is a 78 y.o. year old who presents today for evaluation of bilateral knee pain. Patient has a history of bilateral knee pain and known degenerative changes. We have been focusing on the R knee more so lately and did a gel injection 6 weeks ago without a whole lot of improvement. Overall he is better however. Has been doing PT. Tolerating walking. Stairs are still a challenge. He's having a little bit of left knee pain today as well, we last injected that knee in 2022. He would like bilateral injections today.      Past Medical/Surgical, Social and Family History:  I have reviewed and/or updated pertinent history as noted in the medical record including:  Past Medical History:   Diagnosis Date    Arthritis     Arthritis of neck     Cancer     COLON, PROSTATE, SKIN    Cervical disc disorder     Coronary artery disease     H/O arthroscopy of left knee     H/O arthroscopy of right knee     Hyperlipidemia     Hypertension     Positional sleep apnea 2019    Home sleep study.  AHI normal at 2/h for total monitoring time.  When supine, mildly abnormal at 6.4 events per hour.  No sleep-related hypoxia.    Tear of meniscus of knee     Thoracic disc disorder      Past Surgical History:   Procedure Laterality Date    CARDIAC CATHETERIZATION      stents    COLON SURGERY      COLON RESECTION, COLON CANCER REMOVAL    COLONOSCOPY N/A 2018    Procedure: COLONOSCOPY TO CECUM/TI WITH POLYPECTOMY ( COLD BX);  Surgeon: Moise Garcia MD;  Location: [facility] ENDOSCOPY;  Service: Gastroenterology    COLONOSCOPY N/A 2023    Procedure: COLONOSCOPY TO CECUM;  Surgeon: Ki Ash MD;  Location: [facility] OR;  Service: Gastroenterology;  Laterality: N/A;  POLYPS, HEMORRHOIDS    EYE SURGERY       CATARACTS, MACULAR HOLE REPAIR    KNEE ARTHROSCOPY Right 2020    Procedure: KNEE ARTHROSCOPY, PARTIAL MEDIAL AND LATERAL MENISECTOMY, AND DEBRIDEMENT OF ARTHRITIS;  Surgeon: Mercedes Falk MD;  [facility] [place] [facility];  Service: Orthopedics    KNEE SURGERY      PROSTATE SURGERY      CANCER    SKIN BIOPSY      5X-BASAL CELL CARCINOMA     Social History     Occupational History    Not on file   Tobacco Use    Smoking status: Former     Packs/day: 0.50     Years: 15.00     Additional pack years: 0.00     Total pack years: 7.50     Types: Cigarettes, Pipe     Start date: 1960     Quit date: 1978     Years since quittin.8    Smokeless tobacco: Never    Tobacco comments:     Light smoker during this period   Vaping Use    Vaping Use: Never used   Substance and Sexual Activity    Alcohol use: Yes     Alcohol/week: 2.0 standard drinks of alcohol     Types: 2 Cans of beer per week     Comment: 2 drinks at [employer]    Drug use: No    Sexual activity: Not Currently     Partners: Female     Comment: Prostate removal nerve damage.          Allergies:   Allergies   Allergen Reactions    Bacitracin-Polymyxin B Unknown - Low Severity    Iodine Rash    Latex Rash    Neomycin-Bacitracin Zn-Polymyx Unknown - Low Severity    Povidone-Iodine Rash       Medications:   Home Medications:  Current Outpatient Medications on File Prior to Visit   Medication Sig    aspirin 81 MG chewable tablet Chew 1 tablet Daily.    atorvastatin (LIPITOR) 10 MG tablet Take 1 tablet by mouth Every Night.    Cholecalciferol (VITAMIN D-3) 1000 UNITS capsule Take 2,000 Units by mouth Daily.    clopidogrel (PLAVIX) 75 MG tablet Take 1 tablet by mouth Daily.    Coenzyme Q10 (Co Q 10) 10 MG capsule Take 1 tablet by mouth Daily.    desonide (DESOWEN) 0.05 % lotion Apply 1 application topically to the appropriate area as directed 2 (Two) Times a Day.    ezetimibe (ZETIA) 10 MG tablet Take 1 tablet by mouth Daily.    folic acid (FOLVITE)  1 MG tablet Take 1 tablet by mouth Daily.    gentamicin (GARAMYCIN) 0.1 % cream     glucosamine-chondroitin 500-400 MG capsule capsule Take 2 capsules by mouth Daily.    Influenza Vac High-Dose Quad (Fluzone High-Dose Quadrivalent) 0.7 ML suspension prefilled syringe injection Inject  into the appropriate muscle as directed by prescriber.    metoprolol succinate XL (TOPROL-XL) 25 MG 24 hr tablet Take 0.5mg tablet by mouth daily.  Hold for heart rate less than 60 BPM    Multiple Vitamins-Minerals (CENTRUM SILVER ADULT 50+ PO) Take 1 tablet by mouth Daily.    nitroglycerin (NITROSTAT) 0.4 MG SL tablet Place 1 tablet under the tongue Every 5 (Five) Minutes As Needed for Chest Pain.    Omega-3 Fatty Acids (FISH OIL) 1000 MG capsule capsule Take 1 capsule by mouth Daily With Breakfast.    pantoprazole (PROTONIX) 40 MG injection Daily.    Probiotic Product (PROBIOTIC DAILY PO) Take 1 capsule by mouth Daily.    ramipril (ALTACE) 10 MG capsule Take 1 capsule by mouth Daily.    venlafaxine (EFFEXOR) 75 MG tablet Daily.    vitamin B-12 (CYANOCOBALAMIN) 100 MCG tablet Take 0.5 tablets by mouth Daily.    vitamin B-6 (PYRIDOXINE) 50 MG tablet Take 1 tablet by mouth Daily.     No current facility-administered medications on file prior to visit.         ROS:  ROS negative except as listed in the HPI.    Physical Exam:   78 y.o. male  Body mass index is 30.94 kg/m²., 95 kg (209 lb 8 oz)  Vitals:    10/24/23 0926   Temp: 98.6 °F (37 °C)     General: Alert, cooperative, appears well and in no observable distress. Appears stated age and BMI as listed above.  HEENT: Normocephalic, atraumatic on external visual inspection.  CV: No significant peripheral edema.  Respiratory: Normal respiratory effort.  Skin: Warm & well perfused; appropriate skin turgor.  Psych: Appropriate mood & affect.  Neuro: Gross sensation and motor intact in affected extremity/extremities.  Vascular: Peripheral pulses palpable in affected extremity/extremities.      MSK Exam:  Bilateral Knee  No deformity or wounds appreciated. No significant redness or warmth.  Trace effusion noted  Tenderness along the joint line appreciated medial compartment, patellofemoral compartment  ROM 0-130 with pain at terminal motion. +crepitus  Ligamentous exam grossly stable  Quad strength 4-4+/5    Brief hip exam in the affected extremity(ies) grossly unremarkable.  Moves ankle and toes up and down, no significant pain or swelling in the foot, ankle or calf.        Radiology:    No new images were needed at the visit today.     Procedure:   See Procedure Note: The potential risks and benefits of performing a diagnostic and therapeutic injection were discussed with the patient prior to procedure. Risks include, but are not limited to infection, swelling, transient increase in pain, bleeding, bruising. Patient was advised that injections are a diagnostic and therapeutic tool meaning they may not alleviate symptoms at all, or may only provide partial or temporary relief. Injection precautions and aftercare discussed. and Patient is currently on anticoagulation and/or a blood thinning agent which poses an increased risk of more significant bleeding or bruising following an injection. While joint injections are generally well tolerated even on AC, the patient was counseled on this increased risk and advised to monitor for signs of bleeding with proper follow up instructions. Injection precautions and aftercare discussed.      [facility] [place]. Data/Labs: N/A    Assessment & Plan:    ICD-10-CM ICD-9-CM   1. Primary osteoarthritis of both knees  M17.0 715.16   2. Bilateral chronic knee pain  M25.561 719.46    M25.562 338.29    G89.29      No orders of the defined types were placed in this encounter.    Orders Placed This Encounter   Procedures    Large Joint Arthrocentesis: L knee    Large Joint Arthrocentesis: R knee     This is a 77 y/o male with bilateral knee pain and known degenerative changes. Plan is to  proceed with bilateral knee injections today which he tolerated well. Will plan to repeat injections in about 4 months before an upcoming trip in February. He will call with any changes in the interim.     Return in about 16 weeks (around 2/13/2024) for Injection with KARIS Peng.    Patient encouraged to call with questions or concerns prior to follow up.  Recommend ICE and/or HEAT PRN as discussed.  Will discuss with attending physician as needed.  Consider additional referrals, work up and/or advanced imaging as indicated or if patient fails to respond to conservative care.        KARIS Sue      Dictated Utilizing [employer] Dictation    Large Joint Arthrocentesis: L knee  Date/Time: 10/24/2023 9:38 AM  Consent given by: patient  Site marked: site marked  Timeout: Immediately prior to procedure a time out was called to verify the correct patient, procedure, equipment, support staff and site/side marked as required   Supporting Documentation  Indications: pain and joint swelling   Procedure Details  Location: knee - L knee  Preparation: Patient was prepped and draped in the usual sterile fashion  Needle gauge: 21 G.  Approach: anterolateral  Medications administered: 80 mg methylPREDNISolone acetate 80 MG/ML; 2 mL lidocaine PF 1% 1 %  Patient tolerance: patient tolerated the procedure well with no immediate complications      Large Joint Arthrocentesis: R knee  Date/Time: 10/24/2023 9:38 AM  Consent given by: patient  Site marked: site marked  Timeout: Immediately prior to procedure a time out was called to verify the correct patient, procedure, equipment, support staff and site/side marked as required   Supporting Documentation  Indications: pain and joint swelling   Procedure Details  Location: knee - R knee  Preparation: Patient was prepped and draped in the usual sterile fashion  Needle gauge: 21 G.  Approach: anterolateral  Medications administered: 80 mg methylPREDNISolone acetate 80  MG/ML; 2 mL lidocaine PF 1% 1 %  Patient tolerance: patient tolerated the procedure well with no immediate complications

## 2023-10-24 ENCOUNTER — OFFICE VISIT (OUTPATIENT)
Dept: ORTHOPEDIC SURGERY | Facility: CLINIC | Age: 78
End: 2023-10-24
Payer: MEDICARE

## 2023-10-24 VITALS — WEIGHT: 209.5 LBS | TEMPERATURE: 98.6 F | BODY MASS INDEX: 31.03 KG/M2 | HEIGHT: 69 IN

## 2023-10-24 DIAGNOSIS — M25.562 BILATERAL CHRONIC KNEE PAIN: ICD-10-CM

## 2023-10-24 DIAGNOSIS — M17.0 PRIMARY OSTEOARTHRITIS OF BOTH KNEES: Primary | ICD-10-CM

## 2023-10-24 DIAGNOSIS — G89.29 BILATERAL CHRONIC KNEE PAIN: ICD-10-CM

## 2023-10-24 DIAGNOSIS — M25.561 BILATERAL CHRONIC KNEE PAIN: ICD-10-CM

## 2023-10-24 PROCEDURE — 1160F RVW MEDS BY RX/DR IN RCRD: CPT | Performed by: NURSE PRACTITIONER

## 2023-10-24 PROCEDURE — 1159F MED LIST DOCD IN RCRD: CPT | Performed by: NURSE PRACTITIONER

## 2023-10-24 PROCEDURE — 20610 DRAIN/INJ JOINT/BURSA W/O US: CPT | Performed by: NURSE PRACTITIONER

## 2023-10-24 RX ORDER — LIDOCAINE HYDROCHLORIDE 10 MG/ML
2 INJECTION, SOLUTION EPIDURAL; INFILTRATION; INTRACAUDAL; PERINEURAL
Status: COMPLETED | OUTPATIENT
Start: 2023-10-24 | End: 2023-10-24

## 2023-10-24 RX ORDER — METHYLPREDNISOLONE ACETATE 80 MG/ML
80 INJECTION, SUSPENSION INTRA-ARTICULAR; INTRALESIONAL; INTRAMUSCULAR; SOFT TISSUE
Status: COMPLETED | OUTPATIENT
Start: 2023-10-24 | End: 2023-10-24

## 2023-10-24 RX ADMIN — METHYLPREDNISOLONE ACETATE 80 MG: 80 INJECTION, SUSPENSION INTRA-ARTICULAR; INTRALESIONAL; INTRAMUSCULAR; SOFT TISSUE at 09:38

## 2023-10-24 RX ADMIN — LIDOCAINE HYDROCHLORIDE 2 ML: 10 INJECTION, SOLUTION EPIDURAL; INFILTRATION; INTRACAUDAL; PERINEURAL at 09:38

## 2023-10-25 ENCOUNTER — TELEPHONE (OUTPATIENT)
Dept: ORTHOPEDIC SURGERY | Facility: CLINIC | Age: 78
End: 2023-10-25

## 2023-10-25 NOTE — TELEPHONE ENCOUNTER
Caller: Jed Correia    Relationship to patient: Self    Best call back number: [phone]    Patient is needing: PATIENT NEEDING TO RESCHEDULE 02/12/24 CORTISONE INJECTION WITH WILFRID SWANSON.

## 2023-11-07 ENCOUNTER — TELEPHONE (OUTPATIENT)
Dept: CARDIOLOGY | Facility: CLINIC | Age: 78
End: 2023-11-07
Payer: MEDICARE

## 2023-11-07 ENCOUNTER — HOSPITAL ENCOUNTER (OUTPATIENT)
Dept: CARDIOLOGY | Facility: HOSPITAL | Age: 78
Discharge: HOME | End: 2023-11-07
Admitting: INTERNAL MEDICINE
Payer: MEDICARE

## 2023-11-07 VITALS
WEIGHT: 209 LBS | OXYGEN SATURATION: 98 % | BODY MASS INDEX: 30.96 KG/M2 | HEIGHT: 69 IN | DIASTOLIC BLOOD PRESSURE: 72 MMHG | SYSTOLIC BLOOD PRESSURE: 130 MMHG | HEART RATE: 58 BPM

## 2023-11-07 DIAGNOSIS — R01.1 HEART MURMUR: ICD-10-CM

## 2023-11-07 LAB
AORTIC ARCH: 3.4 CM
AORTIC DIMENSIONLESS INDEX: 0.9 (DI)
ASCENDING AORTA: 3.1 CM
BH CV ECHO MEAS - ACS: 2.12 CM
BH CV ECHO MEAS - AI P1/2T: 674.6 MSEC
BH CV ECHO MEAS - AO MAX PG: 5.5 MMHG
BH CV ECHO MEAS - AO MEAN PG: 3 MMHG
BH CV ECHO MEAS - AO V2 MAX: 117 CM/SEC
BH CV ECHO MEAS - AO V2 VTI: 26.3 CM
BH CV ECHO MEAS - AVA(I,D): 3.8 CM2
BH CV ECHO MEAS - EDV(CUBED): 103.8 ML
BH CV ECHO MEAS - EDV(MOD-SP2): 111 ML
BH CV ECHO MEAS - EDV(MOD-SP4): 110 ML
BH CV ECHO MEAS - EF(MOD-BP): 65.7 %
BH CV ECHO MEAS - EF(MOD-SP2): 69.4 %
BH CV ECHO MEAS - EF(MOD-SP4): 62.7 %
BH CV ECHO MEAS - ESV(CUBED): 51.1 ML
BH CV ECHO MEAS - ESV(MOD-SP2): 34 ML
BH CV ECHO MEAS - ESV(MOD-SP4): 41 ML
BH CV ECHO MEAS - FS: 21.1 %
BH CV ECHO MEAS - IVS/LVPW: 1 CM
BH CV ECHO MEAS - IVSD: 1 CM
BH CV ECHO MEAS - LAT PEAK E' VEL: 9.7 CM/SEC
BH CV ECHO MEAS - LV DIASTOLIC VOL/BSA (35-75): 52.3 CM2
BH CV ECHO MEAS - LV MASS(C)D: 164.5 GRAMS
BH CV ECHO MEAS - LV MAX PG: 3.9 MMHG
BH CV ECHO MEAS - LV MEAN PG: 2 MMHG
BH CV ECHO MEAS - LV SYSTOLIC VOL/BSA (12-30): 19.5 CM2
BH CV ECHO MEAS - LV V1 MAX: 98.8 CM/SEC
BH CV ECHO MEAS - LV V1 VTI: 24.4 CM
BH CV ECHO MEAS - LVIDD: 4.7 CM
BH CV ECHO MEAS - LVIDS: 3.7 CM
BH CV ECHO MEAS - LVOT AREA: 4.1 CM2
BH CV ECHO MEAS - LVOT DIAM: 2.29 CM
BH CV ECHO MEAS - LVPWD: 1 CM
BH CV ECHO MEAS - MED PEAK E' VEL: 7.9 CM/SEC
BH CV ECHO MEAS - MV A DUR: 0.1 SEC
BH CV ECHO MEAS - MV A MAX VEL: 96.1 CM/SEC
BH CV ECHO MEAS - MV DEC SLOPE: 378.6 CM/SEC2
BH CV ECHO MEAS - MV DEC TIME: 0.23 SEC
BH CV ECHO MEAS - MV E MAX VEL: 73.7 CM/SEC
BH CV ECHO MEAS - MV E/A: 0.77
BH CV ECHO MEAS - MV MAX PG: 4.6 MMHG
BH CV ECHO MEAS - MV MEAN PG: 1.24 MMHG
BH CV ECHO MEAS - MV P1/2T: 59.9 MSEC
BH CV ECHO MEAS - MV V2 VTI: 38.6 CM
BH CV ECHO MEAS - MVA(P1/2T): 3.7 CM2
BH CV ECHO MEAS - MVA(VTI): 2.6 CM2
BH CV ECHO MEAS - PA ACC TIME: 0.08 SEC
BH CV ECHO MEAS - PA V2 MAX: 65.2 CM/SEC
BH CV ECHO MEAS - PULM A REVS DUR: 0.12 SEC
BH CV ECHO MEAS - PULM A REVS VEL: 26.7 CM/SEC
BH CV ECHO MEAS - PULM DIAS VEL: 25.7 CM/SEC
BH CV ECHO MEAS - PULM S/D: 1.63
BH CV ECHO MEAS - PULM SYS VEL: 41.9 CM/SEC
BH CV ECHO MEAS - QP/QS: 0.64
BH CV ECHO MEAS - RAP SYSTOLE: 3 MMHG
BH CV ECHO MEAS - RV MAX PG: 1.52 MMHG
BH CV ECHO MEAS - RV V1 MAX: 61.7 CM/SEC
BH CV ECHO MEAS - RV V1 VTI: 14.4 CM
BH CV ECHO MEAS - RVOT DIAM: 2.38 CM
BH CV ECHO MEAS - RVSP: 15 MMHG
BH CV ECHO MEAS - SI(MOD-SP2): 36.6 ML/M2
BH CV ECHO MEAS - SI(MOD-SP4): 32.8 ML/M2
BH CV ECHO MEAS - SUP REN AO DIAM: 1.9 CM
BH CV ECHO MEAS - SV(LVOT): 100.5 ML
BH CV ECHO MEAS - SV(MOD-SP2): 77 ML
BH CV ECHO MEAS - SV(MOD-SP4): 69 ML
BH CV ECHO MEAS - SV(RVOT): 64.1 ML
BH CV ECHO MEAS - TAPSE (>1.6): 1.71 CM
BH CV ECHO MEAS - TR MAX PG: 12.1 MMHG
BH CV ECHO MEAS - TR MAX VEL: 173.7 CM/SEC
BH CV ECHO MEASUREMENTS AVERAGE E/E' RATIO: 8.38
BH CV VAS BP LEFT ARM: NORMAL MMHG
BH CV XLRA - RV BASE: 3 CM
BH CV XLRA - RV LENGTH: 6.9 CM
BH CV XLRA - RV MID: 3.5 CM
BH CV XLRA - TDI S': 12.5 CM/SEC
LEFT ATRIUM VOLUME INDEX: 22.4 ML/M2
SINUS: 3.6 CM
STJ: 3.5 CM

## 2023-11-07 PROCEDURE — 93306 TTE W/DOPPLER COMPLETE: CPT

## 2023-11-07 PROCEDURE — 25510000001 PERFLUTREN (DEFINITY) 8.476 MG IN SODIUM CHLORIDE (PF) 0.9 % 10 ML INJECTION: Performed by: INTERNAL MEDICINE

## 2023-11-07 PROCEDURE — 93306 TTE W/DOPPLER COMPLETE: CPT | Performed by: INTERNAL MEDICINE

## 2023-11-07 RX ORDER — VALSARTAN 160 MG/1
160 TABLET ORAL DAILY
Qty: 30 TABLET | Refills: 11 | Status: SHIPPED | OUTPATIENT
Start: 2023-11-07

## 2023-11-07 RX ADMIN — PERFLUTREN 1.5 ML: 6.52 INJECTION, SUSPENSION INTRAVENOUS at 07:41

## 2023-11-07 NOTE — TELEPHONE ENCOUNTER
I have sent in valsartan 160 mg daily to replace ramipril 10 mg daily.  He should not take both agents on the same day.  If he is able to  valsartan this evening then it is okay to start valsartan tomorrow.  I would like for him to continue daily blood pressure log and get us another blood pressure log close to the end of the month

## 2023-11-07 NOTE — TELEPHONE ENCOUNTER
His blood pressure averages elevated.  I would either switch ramipril to valsartan or we can add a new agent called amlodipine 2.5 mg daily to his current regimen.  Does he have a preference?

## 2023-11-07 NOTE — TELEPHONE ENCOUNTER
Pt prefers to switch from ramipril to valsartan.  He doesn't want to add another medicine if there's another option.    Laura Pearson, RN  Triage RN  11/07/23 13:23 EST

## 2023-11-07 NOTE — TELEPHONE ENCOUNTER
Please inform patient echocardiogram is final.  The lung pressure measures normally.  The main pumping chamber is strong and the aortic valve is moderately calcified with mild aortic valve regurgitation.  There is also mild mitral valve regurgitation.  These are both age-related change and there is no additional testing needed at this time.  We will adjust his blood pressure medicine with valsartan as previously directed and wait for an update with his values at the end of the month.

## 2023-11-07 NOTE — TELEPHONE ENCOUNTER
Reviewed recommendations with patient, verbalized understanding, will call with any further questions or complaints.  Pt will change to valsartan when he is able to get prescription (knows not to take both meds the same day), and will send in BP logs near the end of the month.    Laura Pearson RN  Triage Nurse  11/07/23 13:46 EST

## 2023-11-07 NOTE — TELEPHONE ENCOUNTER
Notified patient of results/recommendations. Patient verbalized understanding.    Sophia Hernandez RN  T[place] [facility]

## 2023-11-21 RX ORDER — METOPROLOL SUCCINATE 25 MG/1
TABLET, EXTENDED RELEASE ORAL
Qty: 30 TABLET | Refills: 3 | Status: SHIPPED | OUTPATIENT
Start: 2023-11-21

## 2023-12-08 ENCOUNTER — TELEPHONE (OUTPATIENT)
Dept: CARDIOLOGY | Facility: CLINIC | Age: 78
End: 2023-12-08
Payer: MEDICARE

## 2023-12-08 DIAGNOSIS — I10 PRIMARY HYPERTENSION: Primary | ICD-10-CM

## 2023-12-08 NOTE — TELEPHONE ENCOUNTER
Spoke to patient about blood pressure log that shows better control but not optimal.  We do not have any labs since he was started on valsartan.  He will come in on Monday and have a BMP and then we will make a decision about uptitrating dose of valsartan.

## 2023-12-11 ENCOUNTER — TELEPHONE (OUTPATIENT)
Dept: CARDIOLOGY | Facility: CLINIC | Age: 78
End: 2023-12-11
Payer: MEDICARE

## 2023-12-11 ENCOUNTER — LAB (OUTPATIENT)
Dept: LAB | Facility: HOSPITAL | Age: 78
End: 2023-12-11
Payer: MEDICARE

## 2023-12-11 DIAGNOSIS — I10 PRIMARY HYPERTENSION: ICD-10-CM

## 2023-12-11 LAB
ANION GAP SERPL CALCULATED.3IONS-SCNC: 8.4 MMOL/L (ref 5–15)
BUN SERPL-MCNC: 12 MG/DL (ref 8–23)
BUN/CREAT SERPL: 15.4 (ref 7–25)
CALCIUM SPEC-SCNC: 9.4 MG/DL (ref 8.6–10.5)
CHLORIDE SERPL-SCNC: 102 MMOL/L (ref 98–107)
CO2 SERPL-SCNC: 28.6 MMOL/L (ref 22–29)
CREAT SERPL-MCNC: 0.78 MG/DL (ref 0.76–1.27)
EGFRCR SERPLBLD CKD-EPI 2021: 90.7 ML/MIN/1.73
GLUCOSE SERPL-MCNC: 115 MG/DL (ref 65–99)
POTASSIUM SERPL-SCNC: 4.7 MMOL/L (ref 3.5–5.2)
SODIUM SERPL-SCNC: 139 MMOL/L (ref 136–145)

## 2023-12-11 PROCEDURE — 36415 COLL VENOUS BLD VENIPUNCTURE: CPT

## 2023-12-11 PROCEDURE — 80048 BASIC METABOLIC PNL TOTAL CA: CPT

## 2023-12-11 RX ORDER — VALSARTAN 320 MG/1
320 TABLET ORAL DAILY
Qty: 30 TABLET | Refills: 3 | Status: SHIPPED | OUTPATIENT
Start: 2023-12-11

## 2023-12-11 NOTE — TELEPHONE ENCOUNTER
Results and recommendations called to pt's wife.  Instructed to call with any further questions or concerns.  Verbalized understanding.  Will increase valsartan dose as recommended.  Follow up appointment moved up as requested.    Laura Pearson RN  Triage Nurse, [facility]  12/11/23 10:21 EST

## 2023-12-11 NOTE — TELEPHONE ENCOUNTER
Please inform patient renal function remained stable on valsartan 160 mg daily.  Since his blood pressure is not adequately controlled I would like to increase valsartan to 320 mg daily.  He may take 2 of his 160 mg tablets at a time to achieve this but I have also sent a new prescription to [facility].  I think he should be seen sooner than April since we are continuing to make medication changes.  Okay to move him up on my schedule in 6 to 8 weeks and at 3 p slot

## 2024-01-09 DIAGNOSIS — M25.562 BILATERAL CHRONIC KNEE PAIN: Primary | ICD-10-CM

## 2024-01-09 DIAGNOSIS — G89.29 BILATERAL CHRONIC KNEE PAIN: Primary | ICD-10-CM

## 2024-01-09 DIAGNOSIS — M25.561 BILATERAL CHRONIC KNEE PAIN: Primary | ICD-10-CM

## 2024-01-09 RX ORDER — PREDNISONE 10 MG/1
TABLET ORAL
Qty: 10 TABLET | Refills: 0 | OUTPATIENT
Start: 2024-01-09

## 2024-01-09 NOTE — TELEPHONE ENCOUNTER
We cannot prescribe this we have not seen him since October this is a not a drug we use frequently.  If he feels like he needs this he needs to check with his primary care

## 2024-01-09 NOTE — TELEPHONE ENCOUNTER
Spoke to patients wife Celeste and she said he is gone to an appt right now but she would pass the message along to him

## 2024-01-29 ENCOUNTER — OFFICE VISIT (OUTPATIENT)
Dept: CARDIOLOGY | Facility: CLINIC | Age: 79
End: 2024-01-29
Payer: MEDICARE

## 2024-01-29 VITALS
HEIGHT: 69 IN | WEIGHT: 209.6 LBS | SYSTOLIC BLOOD PRESSURE: 140 MMHG | HEART RATE: 54 BPM | DIASTOLIC BLOOD PRESSURE: 80 MMHG | BODY MASS INDEX: 31.04 KG/M2 | OXYGEN SATURATION: 97 %

## 2024-01-29 DIAGNOSIS — I10 PRIMARY HYPERTENSION: Primary | ICD-10-CM

## 2024-01-29 PROCEDURE — 1160F RVW MEDS BY RX/DR IN RCRD: CPT | Performed by: NURSE PRACTITIONER

## 2024-01-29 PROCEDURE — 99214 OFFICE O/P EST MOD 30 MIN: CPT | Performed by: NURSE PRACTITIONER

## 2024-01-29 PROCEDURE — 93000 ELECTROCARDIOGRAM COMPLETE: CPT | Performed by: NURSE PRACTITIONER

## 2024-01-29 PROCEDURE — 1159F MED LIST DOCD IN RCRD: CPT | Performed by: NURSE PRACTITIONER

## 2024-01-29 RX ORDER — METOPROLOL SUCCINATE 25 MG/1
TABLET, EXTENDED RELEASE ORAL
Qty: 90 TABLET | Refills: 1 | Status: SHIPPED | OUTPATIENT
Start: 2024-01-29

## 2024-01-29 NOTE — PROGRESS NOTES
Date of Office Visit: 24  Encounter Provider: KARIS Erickson  Place of Service: [facility] CARDIOLOGY  Patient Name: Jed Correia  :1945    Chief Complaint   Patient presents with    Balance Issues    Follow-up   :     HPI: Jed Correia is a 78 y.o. male  with hypertension, dyslipidemia, premature atrial contraction, coronary artery disease, carotid artery stenoses, aortic valve sclerosis and aortic valve insufficiency, GERD, obstructive sleep apnea, prostate cancer and gastric ulcer.  He had prior colon cancer resection .      He is a former smoker who quit in .  He has family history involving a brother who had myocardial infarction followed by CABG.      He is followed by Dr. Jed Moody. I will visit with him for the first time and have reviewed his medical record.     He was previously followed by [place] [facility] and had drug-eluting stent placement 2023 to the proximal and mid RCA with overlapping Xience stents.  he had a Holter monitor in 2022 which showed 15% premature atrial contraction but no sustained arrhythmia.    He had mild to moderate carotid artery stenosis on last duplex 10/2022.  He then had cardiac murmur appreciated 2023 and had echocardiogram 2023 which showed normal left ventricular systolic function, mild concentric hypertrophy, grade 1 diastolic dysfunction, moderately calcified aortic valve with mild aortic valve regurgitation and normal RVSP.    He presents today for reassessment.  He brought a list of blood pressure readings showing values 135//70.  Most values are upper 130s-low 140s over 60-70.  He denies chest pain tightness pressure or edema or palpitations.  He tells me he is scheduled to see his PCP within the next couple weeks for routine labs as well as being scheduled for carotid Doppler tomorrow.  He denies blood in the urine or stool.    Allergies   Allergen Reactions     "Bacitracin-Polymyxin B Unknown - Low Severity    Iodine Rash    Latex Rash    Neomycin-Bacitracin Zn-Polymyx Unknown - Low Severity    Povidone-Iodine Rash           Family and social history reviewed.     ROS  All other systems were reviewed and are negative          Objective:     Vitals:    01/29/24 0937   BP: 140/80   BP Location: Left arm   Patient Position: Sitting   Cuff Size: Adult   Pulse: 54   SpO2: 97%   Weight: 95.1 kg (209 lb 9.6 oz)   Height: 175.3 cm (69\")     Body mass index is 30.95 kg/m².    PHYSICAL EXAM:  Pulmonary:      Effort: Pulmonary effort is normal.      Breath sounds: Normal breath sounds.   Cardiovascular:      Occasional ectopic beats. Regularly irregular rhythm.      Murmurs: There is a grade 1/6 systolic murmur at the URSB.           ECG 12 Lead    Date/Time: 1/29/2024 9:48 AM  Performed by: Joleen Gregorio APRN    Authorized by: Joleen Gregorio APRN  Comparison: compared with previous ECG   Similar to previous ECG  Rhythm: sinus rhythm  Ectopy: atrial premature contractions  Rate: normal            Current Outpatient Medications   Medication Sig Dispense Refill    aspirin 81 MG chewable tablet Chew 1 tablet Daily. 90 tablet 3    atorvastatin (LIPITOR) 10 MG tablet Take 1 tablet by mouth Every Night. 90 tablet 3    Cholecalciferol (VITAMIN D-3) 1000 UNITS capsule Take 2,000 Units by mouth Daily.      clopidogrel (PLAVIX) 75 MG tablet Take 1 tablet by mouth Daily. 90 tablet 3    Coenzyme Q10 (Co Q 10) 10 MG capsule Take 1 tablet by mouth Daily. 90 each 3    desonide (DESOWEN) 0.05 % lotion Apply 1 application topically to the appropriate area as directed 2 (Two) Times a Day.      ezetimibe (ZETIA) 10 MG tablet Take 1 tablet by mouth Daily. 90 tablet 3    folic acid (FOLVITE) 1 MG tablet Take 1 tablet by mouth Daily.      gentamicin (GARAMYCIN) 0.1 % cream       glucosamine-chondroitin 500-400 MG capsule capsule Take 2 capsules by mouth Daily.      metoprolol succinate XL (TOPROL-XL) 25 " MG 24 hr tablet TAKE 1/2 TABLET BY MOUTH DAILY. HOLD FOR HEART RATE LESS THEN 60 BPM. 90 tablet 1    Multiple Vitamins-Minerals (CENTRUM SILVER ADULT 50+ PO) Take 1 tablet by mouth Daily.      nitroglycerin (NITROSTAT) 0.4 MG SL tablet Place 1 tablet under the tongue Every 5 (Five) Minutes As Needed for Chest Pain. 15 tablet 11    Omega-3 Fatty Acids (FISH OIL) 1000 MG capsule capsule Take 1 capsule by mouth Daily With Breakfast.      pantoprazole (PROTONIX) 40 MG injection Daily.      Probiotic Product (PROBIOTIC DAILY PO) Take 1 capsule by mouth Daily.      valsartan (DIOVAN) 320 MG tablet Take 1 tablet by mouth Daily. 30 tablet 3    venlafaxine (EFFEXOR) 75 MG tablet Daily.      vitamin B-12 (CYANOCOBALAMIN) 100 MCG tablet Take 0.5 tablets by mouth Daily.      vitamin B-6 (PYRIDOXINE) 50 MG tablet Take 1 tablet by mouth Daily.      Influenza Vac High-Dose Quad (Fluzone High-Dose Quadrivalent) 0.7 ML suspension prefilled syringe injection Inject  into the appropriate muscle as directed by prescriber. 0.7 mL 0     No current facility-administered medications for this visit.     Assessment:       Diagnosis Plan   1. Primary hypertension  ECG 12 Lead           Orders Placed This Encounter   Procedures    ECG 12 Lead     This order was created via procedure documentation     Order Specific Question:   Release to patient     Answer:   Routine Release [[id number]]         Plan:   1.  78-year-old gentleman with coronary artery disease and prior PCI in 02/2013.  He has no angina.  He will continue aspirin and statin therapy  2.  Hypertension-given his age blood pressure is overall stable.  His valsartan was increased last month.  He is scheduled for blood work with his PCP within the next couple weeks providing renal function is stable we will continue the same  3.  Dyslipidemia.  Target LDL 70 or less.  He is maintained on atorvastatin 10 mg, ezetimibe and omega-3 fatty acid-as above scheduled for blood work with his PCP  within the next couple weeks  4.  Carotid artery stenoses with moderate stenoses in the right and mild stenoses on the left on last duplex 10/2022  5.  Premature atrial contraction, frequent at 15% on prior Holter monitor in 03/2022 @ [facility]  6.  Cardiac murmur with moderately calcified aortic valve and mild aortic valve regurgitation on echo 11/2023  7.  Former smoker who quit in 1978  8.  GERD  9.  History of gastric ulcer  10.  Colon cancer status post resection 1986  11.  Prostate cancer that is post prostatectomy 01/2011    Keep appointment in April to follow-up          It has been a pleasure to participate in this patient's care.      Thank you,  KARIS Erickson      **I used Dragon to dictate this note:**

## 2024-02-08 NOTE — PROGRESS NOTES
[facility]  Follow Up Visit      Patient Name: Jed Correia  : 1945  Primary Care Physician: Myke Landaverde MD        Chief Complaint: Bilateral knee pain     HPI:   Jed Correia is a 78 y.o. year old who presents today for bilateral knee pain.  Patient history of chronic bilateral knee pain and known degenerative changes.  I last saw him back in the end of October.  He got injections in both knees and did really well with them.  He has noticed recently symptoms started to return.  He is getting ready to go on a trip and wanted to come in for injections again today.    ROS:  ROS negative except as listed in the HPI.    Allergies:   Allergies   Allergen Reactions    Bacitracin-Polymyxin B Unknown - Low Severity    Iodine Rash    Latex Rash    Neomycin-Bacitracin Zn-Polymyx Unknown - Low Severity    Povidone-Iodine Rash       Medications:  Current Outpatient Medications on File Prior to Visit   Medication Sig    aspirin 81 MG chewable tablet Chew 1 tablet Daily.    atorvastatin (LIPITOR) 10 MG tablet Take 1 tablet by mouth Every Night.    Cholecalciferol (VITAMIN D-3) 1000 UNITS capsule Take 2,000 Units by mouth Daily.    clopidogrel (PLAVIX) 75 MG tablet Take 1 tablet by mouth Daily.    Coenzyme Q10 (Co Q 10) 10 MG capsule Take 1 tablet by mouth Daily.    desonide (DESOWEN) 0.05 % lotion Apply 1 application topically to the appropriate area as directed 2 (Two) Times a Day.    ezetimibe (ZETIA) 10 MG tablet Take 1 tablet by mouth Daily.    folic acid (FOLVITE) 1 MG tablet Take 1 tablet by mouth Daily.    gentamicin (GARAMYCIN) 0.1 % cream     glucosamine-chondroitin 500-400 MG capsule capsule Take 2 capsules by mouth Daily.    Influenza Vac High-Dose Quad (Fluzone High-Dose Quadrivalent) 0.7 ML suspension prefilled syringe injection Inject  into the appropriate muscle as directed by prescriber.    metoprolol succinate XL (TOPROL-XL) 25 MG 24 hr tablet TAKE 1/2 TABLET BY MOUTH DAILY. HOLD FOR HEART  RATE LESS THEN 60 BPM.    Multiple Vitamins-Minerals (CENTRUM SILVER ADULT 50+ PO) Take 1 tablet by mouth Daily.    nitroglycerin (NITROSTAT) 0.4 MG SL tablet Place 1 tablet under the tongue Every 5 (Five) Minutes As Needed for Chest Pain.    Omega-3 Fatty Acids (FISH OIL) 1000 MG capsule capsule Take 1 capsule by mouth Daily With Breakfast.    pantoprazole (PROTONIX) 40 MG injection Daily.    Probiotic Product (PROBIOTIC DAILY PO) Take 1 capsule by mouth Daily.    valsartan (DIOVAN) 320 MG tablet Take 1 tablet by mouth Daily.    venlafaxine (EFFEXOR) 75 MG tablet Daily.    vitamin B-12 (CYANOCOBALAMIN) 100 MCG tablet Take 0.5 tablets by mouth Daily.    vitamin B-6 (PYRIDOXINE) 50 MG tablet Take 1 tablet by mouth Daily.     No current facility-administered medications on file prior to visit.       Physical Exam:   78 y.o. male  Body mass index is 31.31 kg/m²., 96.2 kg (212 lb)  Vitals:    02/09/24 1102   Temp: 98.3 °F (36.8 °C)     General: Alert, cooperative, appears well and in no observable distress. Appears stated age and BMI as listed above.  Respiratory: Normal respiratory effort.  Skin: Warm & well perfused; appropriate skin turgor.  Psych: Appropriate mood & affect.     Radiology:    No new images were needed at the visit today.     Procedure:   See Procedure Note: The potential risks and benefits of performing a diagnostic and therapeutic injection were discussed with the patient prior to procedure. Risks include, but are not limited to infection, swelling, transient increase in pain, bleeding, bruising. Patient was advised that injections are a diagnostic and therapeutic tool meaning they may not alleviate symptoms at all, or may only provide partial or temporary relief. Injection precautions and aftercare discussed. and Patient is currently on anticoagulation and/or a blood thinning agent which poses an increased risk of more significant bleeding or bruising following an injection. While joint injections  are generally well tolerated even on AC, the patient was counseled on this increased risk and advised to monitor for signs of bleeding with proper follow up instructions. Injection precautions and aftercare discussed.    [facility] [place]abs[place] N/A    Assessment & Plan:    ICD-10-CM ICD-9-CM   1. Bilateral chronic knee pain  M25.561 719.46    M25.562 338.29    G89.29    2. Primary osteoarthritis of both knees  M17.0 715.16     No orders of the defined types were placed in this encounter.    Orders Placed This Encounter   Procedures    Large Joint Arthrocentesis: R knee    Large Joint Arthrocentesis: L knee     Patient has a history of bilateral knee pain and known degenerative changes.  He got great relief with cortisone injection previously.  I think it is reasonable to repeat that.  He does have a another vacation coming up think this will help control his symptoms.  We talked about going forward trying to limit the number of injections is much as possible but I think if he needed them a couple times a year that would be reasonable.  I do not think that his current symptoms are significant enough to consider surgical intervention he seems to be improving we will see him back as needed.    Return if symptoms worsen or fail to improve.    Patient encouraged to call with questions or concerns prior to follow up.  Recommend ICE and/or HEAT PRN as discussed.  Will discuss with attending physician as needed.  Consider additional referrals, work up and/or advanced imaging as indicated or if patient fails to respond to conservative care.        Large Joint Arthrocentesis: R knee  Date/Time: 2/9/2024 11:10 AM  Consent given by: patient  Site marked: site marked  Timeout: Immediately prior to procedure a time out was called to verify the correct patient, procedure, equipment, support staff and site/side marked as required   Supporting Documentation  Indications: pain   Procedure Details  Location: knee - R knee  Preparation:  Patient was prepped and draped in the usual sterile fashion  Needle gauge: 21G.  Approach: anterolateral  Medications administered: 1 mL methylPREDNISolone acetate 80 MG/ML; 2 mL lidocaine PF 1% 1 %  Patient tolerance: patient tolerated the procedure well with no immediate complications      Large Joint Arthrocentesis: L knee  Date/Time: 2/9/2024 11:10 AM  Consent given by: patient  Site marked: site marked  Timeout: Immediately prior to procedure a time out was called to verify the correct patient, procedure, equipment, support staff and site/side marked as required   Supporting Documentation  Indications: pain   Procedure Details  Location: knee - L knee  Preparation: Patient was prepped and draped in the usual sterile fashion  Needle gauge: 21G.  Approach: anterolateral  Medications administered: 1 mL methylPREDNISolone acetate 80 MG/ML; 2 mL lidocaine PF 1% 1 %  Patient tolerance: patient tolerated the procedure well with no immediate complications           Tylor Lynch, APRN

## 2024-02-09 ENCOUNTER — CLINICAL SUPPORT (OUTPATIENT)
Dept: ORTHOPEDIC SURGERY | Facility: CLINIC | Age: 79
End: 2024-02-09
Payer: MEDICARE

## 2024-02-09 VITALS — HEIGHT: 69 IN | TEMPERATURE: 98.3 F | WEIGHT: 212 LBS | BODY MASS INDEX: 31.4 KG/M2

## 2024-02-09 DIAGNOSIS — M25.561 BILATERAL CHRONIC KNEE PAIN: Primary | ICD-10-CM

## 2024-02-09 DIAGNOSIS — G89.29 BILATERAL CHRONIC KNEE PAIN: Primary | ICD-10-CM

## 2024-02-09 DIAGNOSIS — M17.0 PRIMARY OSTEOARTHRITIS OF BOTH KNEES: ICD-10-CM

## 2024-02-09 DIAGNOSIS — M25.562 BILATERAL CHRONIC KNEE PAIN: Primary | ICD-10-CM

## 2024-02-09 RX ORDER — METHYLPREDNISOLONE ACETATE 80 MG/ML
1 INJECTION, SUSPENSION INTRA-ARTICULAR; INTRALESIONAL; INTRAMUSCULAR; SOFT TISSUE
Status: COMPLETED | OUTPATIENT
Start: 2024-02-09 | End: 2024-02-09

## 2024-02-09 RX ORDER — LIDOCAINE HYDROCHLORIDE 10 MG/ML
2 INJECTION, SOLUTION EPIDURAL; INFILTRATION; INTRACAUDAL; PERINEURAL
Status: COMPLETED | OUTPATIENT
Start: 2024-02-09 | End: 2024-02-09

## 2024-02-09 RX ADMIN — METHYLPREDNISOLONE ACETATE 1 ML: 80 INJECTION, SUSPENSION INTRA-ARTICULAR; INTRALESIONAL; INTRAMUSCULAR; SOFT TISSUE at 11:10

## 2024-02-09 RX ADMIN — LIDOCAINE HYDROCHLORIDE 2 ML: 10 INJECTION, SOLUTION EPIDURAL; INFILTRATION; INTRACAUDAL; PERINEURAL at 11:10

## 2024-04-19 ENCOUNTER — OFFICE VISIT (OUTPATIENT)
Dept: CARDIOLOGY | Facility: CLINIC | Age: 79
End: 2024-04-19
Payer: MEDICARE

## 2024-04-19 VITALS
HEIGHT: 69 IN | DIASTOLIC BLOOD PRESSURE: 60 MMHG | HEART RATE: 72 BPM | SYSTOLIC BLOOD PRESSURE: 130 MMHG | WEIGHT: 214 LBS | BODY MASS INDEX: 31.7 KG/M2 | OXYGEN SATURATION: 97 %

## 2024-04-19 DIAGNOSIS — I35.8 AORTIC VALVE SCLEROSIS: ICD-10-CM

## 2024-04-19 DIAGNOSIS — I35.1 AORTIC VALVE INSUFFICIENCY, ETIOLOGY OF CARDIAC VALVE DISEASE UNSPECIFIED: ICD-10-CM

## 2024-04-19 DIAGNOSIS — I10 PRIMARY HYPERTENSION: Primary | ICD-10-CM

## 2024-04-19 NOTE — PROGRESS NOTES
Date of Office Visit: 24  Encounter Provider: KARIS Erickson  Place of Service: [facility] CARDIOLOGY  Patient Name: Jed Correia  :1945    Chief Complaint   Patient presents with    Follow-up    Coronary artery disease involving coronary bypass graft of     Primary hypertension   :     HPI: Jed Correia is a 78 y.o. male  with hypertension, dyslipidemia, premature atrial contraction, coronary artery disease, carotid artery stenoses, aortic valve sclerosis and aortic valve insufficiency, GERD, obstructive sleep apnea, prostate cancer and gastric ulcer.  He had prior colon cancer resection .       He is a former smoker who quit in .  He has family history involving a brother who had myocardial infarction followed by CABG.        He is followed by Dr. Jed Moody. I will visit with him in follow up and have reviewed his medical record.      He was previously followed by [place] [facility] and had drug-eluting stent placement 2023 to the proximal and mid RCA with overlapping Xience stents.  he had a Holter monitor in 2022 which showed 15% premature atrial contraction but no sustained arrhythmia.     He had mild to moderate carotid artery stenosis on last duplex 10/2022.  He then had cardiac murmur appreciated 2023 and had echocardiogram 2023 which showed normal left ventricular systolic function, mild concentric hypertrophy, grade 1 diastolic dysfunction, moderately calcified aortic valve with mild aortic valve regurgitation and normal RVSP.    I did have some elevated blood pressure and was started on losartan.  Follow-up blood work 2024 showed stable renal function.    He presents today for reassessment.  His blood pressure at home has been doing well.  He has been walking and riding stationary bike for 5 days a week.  He does the bike for 40 minutes and also walks about an hour.  He was normal appliance for sleep apnea.   "He has had no chest pain or dizziness or shortness of breath or edema or palpitations.  He has been feeling well.      Allergies   Allergen Reactions    Bacitracin-Polymyxin B Unknown - Low Severity    Iodine Rash    Latex Rash    Neomycin-Bacitracin Zn-Polymyx Unknown - Low Severity    Povidone-Iodine Rash           Family and social history reviewed.     ROS  All other systems were reviewed and are negative          Objective:     Vitals:    04/19/24 1340   BP: 130/60   BP Location: Left arm   Patient Position: Sitting   Pulse: 72   SpO2: 97%   Weight: 97.1 kg (214 lb)   Height: 175.3 cm (69\")     Body mass index is 31.6 kg/m².    PHYSICAL EXAM:  Pulmonary:      Effort: Pulmonary effort is normal.      Breath sounds: Normal breath sounds.   Cardiovascular:      Normal rate. Regular rhythm.      Murmurs: There is a grade 1/6 systolic murmur at the URSB.         Procedures      Current Outpatient Medications   Medication Sig Dispense Refill    aspirin 81 MG chewable tablet Chew 1 tablet Daily. 90 tablet 3    atorvastatin (LIPITOR) 10 MG tablet Take 1 tablet by mouth Every Night. 90 tablet 3    Cholecalciferol (VITAMIN D-3) 1000 UNITS capsule Take 2,000 Units by mouth Daily.      clopidogrel (PLAVIX) 75 MG tablet Take 1 tablet by mouth Daily. 90 tablet 3    Coenzyme Q10 (Co Q 10) 10 MG capsule Take 1 tablet by mouth Daily. 90 each 3    desonide (DESOWEN) 0.05 % lotion Apply 1 application topically to the appropriate area as directed 2 (Two) Times a Day.      ezetimibe (ZETIA) 10 MG tablet Take 1 tablet by mouth Daily. 90 tablet 3    folic acid (FOLVITE) 1 MG tablet Take 1 tablet by mouth Daily.      gentamicin (GARAMYCIN) 0.1 % cream       glucosamine-chondroitin 500-400 MG capsule capsule Take 2 capsules by mouth Daily.      Influenza Vac High-Dose Quad (Fluzone High-Dose Quadrivalent) 0.7 ML suspension prefilled syringe injection Inject  into the appropriate muscle as directed by prescriber. 0.7 mL 0    metoprolol " succinate XL (TOPROL-XL) 25 MG 24 hr tablet TAKE 1/2 TABLET BY MOUTH DAILY. HOLD FOR HEART RATE LESS THEN 60 BPM. 90 tablet 1    Multiple Vitamins-Minerals (CENTRUM SILVER ADULT 50+ PO) Take 1 tablet by mouth Daily.      nitroglycerin (NITROSTAT) 0.4 MG SL tablet Place 1 tablet under the tongue Every 5 (Five) Minutes As Needed for Chest Pain. 15 tablet 11    Omega-3 Fatty Acids (FISH OIL) 1000 MG capsule capsule Take 1 capsule by mouth Daily With Breakfast.      pantoprazole (PROTONIX) 40 MG injection Daily.      Probiotic Product (PROBIOTIC DAILY PO) Take 1 capsule by mouth Daily.      valsartan (DIOVAN) 320 MG tablet Take 1 tablet by mouth Daily. 30 tablet 3    venlafaxine (EFFEXOR) 75 MG tablet Daily.      vitamin B-12 (CYANOCOBALAMIN) 100 MCG tablet Take 0.5 tablets by mouth Daily.      vitamin B-6 (PYRIDOXINE) 50 MG tablet Take 1 tablet by mouth Daily.       No current facility-administered medications for this visit.     Assessment:       Diagnosis Plan   1. Primary hypertension        2. Aortic valve sclerosis        3. Aortic valve insufficiency, etiology of cardiac valve disease unspecified             No orders of the defined types were placed in this encounter.        Plan:       1.  78-year-old gentleman with coronary artery disease and prior PCI in 02/2013.  He has no angina.  He will continue aspirin and statin therapy  2.  Hypertension-blood pressure appears well-controlled on current regimen.  He will continue following blood pressure at home and call me with any issues.  -This is considered complex management of a chronic medical condition.   3.  Dyslipidemia.  Target LDL 70 or less.  He is maintained on atorvastatin 10 mg, ezetimibe and omega-3 fatty acid-reviewed lipid panel from 2/5/2024 showing LDL of 78, total cholesterol 145, triglycerides 128 and HDL 45.  Will continue the same regimen  4.  Carotid artery stenoses with moderate stenoses in the right and mild stenoses on the left on last  duplex 10/2022  5.  Premature atrial contraction, frequent at 15% on prior Holter monitor in 03/2022 @ [facility]  6.  Cardiac murmur with moderately calcified aortic valve and mild aortic valve regurgitation on echo 11/2023  7.  Former smoker who quit in 1978  8.  GERD  9.  History of gastric ulcer  10.  Colon cancer status post resection 1986  11.  Prostate cancer that is post prostatectomy 01/2011    Follow-up in 6 months.        It has been a pleasure to participate in this patient's care.      Thank you,  KARIS Erickson      **I used Dragon to dictate this note:**

## 2024-06-26 ENCOUNTER — HOSPITAL ENCOUNTER (EMERGENCY)
Facility: HOSPITAL | Age: 79
Discharge: HOME | End: 2024-06-26
Attending: EMERGENCY MEDICINE
Payer: MEDICARE

## 2024-06-26 ENCOUNTER — APPOINTMENT (OUTPATIENT)
Dept: GENERAL RADIOLOGY | Facility: HOSPITAL | Age: 79
End: 2024-06-26
Payer: MEDICARE

## 2024-06-26 VITALS
DIASTOLIC BLOOD PRESSURE: 83 MMHG | BODY MASS INDEX: 31.1 KG/M2 | HEIGHT: 69 IN | SYSTOLIC BLOOD PRESSURE: 168 MMHG | OXYGEN SATURATION: 96 % | HEART RATE: 69 BPM | WEIGHT: 210 LBS | TEMPERATURE: 97.7 F | RESPIRATION RATE: 14 BRPM

## 2024-06-26 DIAGNOSIS — R07.89 CHEST WALL PAIN: Primary | ICD-10-CM

## 2024-06-26 LAB
ALBUMIN SERPL-MCNC: 4.4 G/DL (ref 3.5–5.2)
ALBUMIN/GLOB SERPL: 1.8 G/DL
ALP SERPL-CCNC: 79 U/L (ref 39–117)
ALT SERPL W P-5'-P-CCNC: 23 U/L (ref 1–41)
ANION GAP SERPL CALCULATED.3IONS-SCNC: 10 MMOL/L (ref 5–15)
AST SERPL-CCNC: 25 U/L (ref 1–40)
BASOPHILS # BLD AUTO: 0.04 10*3/MM3 (ref 0–0.2)
BASOPHILS NFR BLD AUTO: 0.4 % (ref 0–1.5)
BILIRUB SERPL-MCNC: 1.4 MG/DL (ref 0–1.2)
BUN SERPL-MCNC: 10 MG/DL (ref 8–23)
BUN/CREAT SERPL: 13.7 (ref 7–25)
CALCIUM SPEC-SCNC: 9.8 MG/DL (ref 8.6–10.5)
CHLORIDE SERPL-SCNC: 100 MMOL/L (ref 98–107)
CO2 SERPL-SCNC: 26 MMOL/L (ref 22–29)
CREAT SERPL-MCNC: 0.73 MG/DL (ref 0.76–1.27)
D DIMER PPP FEU-MCNC: 0.41 MCGFEU/ML (ref 0–0.8)
DEPRECATED RDW RBC AUTO: 43.7 FL (ref 37–54)
EGFRCR SERPLBLD CKD-EPI 2021: 92 ML/MIN/1.73
EOSINOPHIL # BLD AUTO: 0.26 10*3/MM3 (ref 0–0.4)
EOSINOPHIL NFR BLD AUTO: 2.5 % (ref 0.3–6.2)
ERYTHROCYTE [DISTWIDTH] IN BLOOD BY AUTOMATED COUNT: 12.6 % (ref 12.3–15.4)
GLOBULIN UR ELPH-MCNC: 2.4 GM/DL
GLUCOSE SERPL-MCNC: 130 MG/DL (ref 65–99)
HCT VFR BLD AUTO: 44.5 % (ref 37.5–51)
HGB BLD-MCNC: 14.5 G/DL (ref 13–17.7)
IMM GRANULOCYTES # BLD AUTO: 0.01 10*3/MM3 (ref 0–0.05)
IMM GRANULOCYTES NFR BLD AUTO: 0.1 % (ref 0–0.5)
LYMPHOCYTES # BLD AUTO: 1.53 10*3/MM3 (ref 0.7–3.1)
LYMPHOCYTES NFR BLD AUTO: 14.8 % (ref 19.6–45.3)
MCH RBC QN AUTO: 30.7 PG (ref 26.6–33)
MCHC RBC AUTO-ENTMCNC: 32.6 G/DL (ref 31.5–35.7)
MCV RBC AUTO: 94.1 FL (ref 79–97)
MONOCYTES # BLD AUTO: 0.98 10*3/MM3 (ref 0.1–0.9)
MONOCYTES NFR BLD AUTO: 9.5 % (ref 5–12)
NEUTROPHILS NFR BLD AUTO: 7.51 10*3/MM3 (ref 1.7–7)
NEUTROPHILS NFR BLD AUTO: 72.7 % (ref 42.7–76)
PLATELET # BLD AUTO: 268 10*3/MM3 (ref 140–450)
PMV BLD AUTO: 9.6 FL (ref 6–12)
POTASSIUM SERPL-SCNC: 4.3 MMOL/L (ref 3.5–5.2)
PROT SERPL-MCNC: 6.8 G/DL (ref 6–8.5)
RBC # BLD AUTO: 4.73 10*6/MM3 (ref 4.14–5.8)
SODIUM SERPL-SCNC: 136 MMOL/L (ref 136–145)
TROPONIN T SERPL HS-MCNC: 12 NG/L
WBC NRBC COR # BLD AUTO: 10.33 10*3/MM3 (ref 3.4–10.8)

## 2024-06-26 PROCEDURE — 71046 X-RAY EXAM CHEST 2 VIEWS: CPT

## 2024-06-26 PROCEDURE — 85379 FIBRIN DEGRADATION QUANT: CPT | Performed by: EMERGENCY MEDICINE

## 2024-06-26 PROCEDURE — 80053 COMPREHEN METABOLIC PANEL: CPT | Performed by: EMERGENCY MEDICINE

## 2024-06-26 PROCEDURE — 84484 ASSAY OF TROPONIN QUANT: CPT | Performed by: EMERGENCY MEDICINE

## 2024-06-26 PROCEDURE — 93010 ELECTROCARDIOGRAM REPORT: CPT | Performed by: INTERNAL MEDICINE

## 2024-06-26 PROCEDURE — 85025 COMPLETE CBC W/AUTO DIFF WBC: CPT | Performed by: EMERGENCY MEDICINE

## 2024-06-26 PROCEDURE — 93005 ELECTROCARDIOGRAM TRACING: CPT | Performed by: EMERGENCY MEDICINE

## 2024-06-26 PROCEDURE — 99284 EMERGENCY DEPT VISIT MOD MDM: CPT

## 2024-06-26 PROCEDURE — 99284 EMERGENCY DEPT VISIT MOD MDM: CPT | Performed by: EMERGENCY MEDICINE

## 2024-06-26 RX ORDER — HYDROCODONE BITARTRATE AND ACETAMINOPHEN 5; 325 MG/1; MG/1
1 TABLET ORAL EVERY 6 HOURS PRN
Qty: 8 TABLET | Refills: 0 | Status: SHIPPED | OUTPATIENT
Start: 2024-06-26

## 2024-06-26 RX ORDER — GUAIFENESIN AND DEXTROMETHORPHAN HYDROBROMIDE 600; 30 MG/1; MG/1
1 TABLET, EXTENDED RELEASE ORAL 2 TIMES DAILY PRN
Qty: 14 TABLET | Refills: 0 | Status: SHIPPED | OUTPATIENT
Start: 2024-06-26

## 2024-06-26 NOTE — DISCHARGE INSTRUCTIONS
Continue azithromycin as previously prescribed  Return ED fever, shortness of air, chest pain, vomiting, syncope, worse condition, any other concerns

## 2024-06-26 NOTE — FSED PROVIDER NOTE
Subjective   History of Present Illness  80yo male pmh significant htn/hyperlipidemia/cad/carotid stenosis/jorge a/pud/prostate ca/colon ca presents ED reporting recent covid-19 infection 06.06.2024, with subsequent development productive cough for which his pmd prescribed zithromax day#2.  Pt now states has developed acute left anterior chest wall pain exacerbated with cough/palpation chest wall.  ROS otherwise noncontributory.    History provided by:  Patient  Cough  Associated symptoms: chest pain    Associated symptoms: no shortness of breath        Review of Systems   Constitutional: Negative.    HENT:  Positive for congestion.    Eyes: Negative.    Respiratory:  Positive for cough. Negative for shortness of breath.    Cardiovascular:  Positive for chest pain.   Gastrointestinal: Negative.    Musculoskeletal: Negative.    Allergic/Immunologic: Negative for immunocompromised state.   All other systems reviewed and are negative.      Past Medical History:   Diagnosis Date    Ankle sprain     Arthritis     Arthritis of neck     Cancer     COLON, PROSTATE, SKIN    Cervical disc disorder     Coronary artery disease     Fracture, foot 1993    H/O arthroscopy of left knee     H/O arthroscopy of right knee     Hyperlipidemia     Hypertension     Knee swelling 2023    Positional sleep apnea 09/18/2019    Home sleep study.  AHI normal at 2/h for total monitoring time.  When supine, mildly abnormal at 6.4 events per hour.  No sleep-related hypoxia.    Tear of meniscus of knee     Tendinitis of knee 2023    Thoracic disc disorder        Allergies   Allergen Reactions    Bacitracin-Polymyxin B Unknown - Low Severity    Iodine Rash    Latex Rash    Neomycin-Bacitracin Zn-Polymyx Unknown - Low Severity    Povidone-Iodine Rash       Past Surgical History:   Procedure Laterality Date    CARDIAC CATHETERIZATION  2014    stents    COLON SURGERY      COLON RESECTION, COLON CANCER REMOVAL    COLONOSCOPY N/A 08/17/2018    Procedure:  COLONOSCOPY TO CECUM/TI WITH POLYPECTOMY ( COLD BX);  Surgeon: Moise Garcia MD;  [facility];  Service: Gastroenterology    COLONOSCOPY N/A 2023    Procedure: COLONOSCOPY TO CECUM;  Surgeon: Ki Ash MD;  [place] [facility] [place];  Service: Gastroenterology;  Laterality: N/A;  POLYPS, HEMORRHOIDS    EYE SURGERY      CATARACTS, MACULAR HOLE REPAIR    KNEE ARTHROSCOPY Right 2020    Procedure: KNEE ARTHROSCOPY, PARTIAL MEDIAL AND LATERAL MENISECTOMY, AND DEBRIDEMENT OF ARTHRITIS;  Surgeon: Mercedes Falk MD;  [facility];  Service: Orthopedics    KNEE SURGERY      PROSTATE SURGERY      CANCER    SKIN BIOPSY      5X-BASAL CELL CARCINOMA       Family History   Problem Relation Age of Onset    Cancer Sister     Cancer Brother     Alcohol abuse Brother     Hypertension Brother     ALS Brother     Cancer Brother     Malig Hyperthermia Neg Hx        Social History     Socioeconomic History    Marital status:    Tobacco Use    Smoking status: Former     Current packs/day: 0.00     Average packs/day: 0.5 packs/day for 17.3 years (8.7 ttl pk-yrs)     Types: Cigarettes, Pipe     Start date: 1960     Quit date: 1978     Years since quittin.5     Passive exposure: Past (family smoked in the  home)    Smokeless tobacco: Never    Tobacco comments:     Caffeine - coffee and tea    Vaping Use    Vaping status: Never Used   Substance and Sexual Activity    Alcohol use: Yes     Alcohol/week: 2.0 standard drinks of alcohol     Types: 2 Cans of beer per week     Comment: 2 drinks at social fumctioms    Drug use: No    Sexual activity: Not Currently     Partners: Female     Comment: Prostate removal nerve damage.           Objective   Physical Exam  Vitals and nursing note reviewed.   Constitutional:       Appearance: Normal appearance.   HENT:      Head: Normocephalic and atraumatic.      Right Ear: External ear normal.      Left Ear: External ear normal.       Nose: Nose normal.      Mouth/Throat:      Mouth: Mucous membranes are moist.      Pharynx: Oropharynx is clear. No oropharyngeal exudate or posterior oropharyngeal erythema.   Eyes:      Pupils: Pupils are equal, round, and reactive to light.   Cardiovascular:      Rate and Rhythm: Normal rate and regular rhythm.      Pulses: Normal pulses.      Heart sounds: Normal heart sounds. No murmur heard.     No friction rub. No gallop.   Pulmonary:      Effort: Pulmonary effort is normal. No respiratory distress.      Breath sounds: Normal breath sounds. No stridor. No wheezing, rhonchi or rales.   Chest:      Chest wall: Tenderness present.          Comments: Ecchymoses noted at site of tenderness  Abdominal:      General: Abdomen is flat. Bowel sounds are normal. There is no distension.      Palpations: Abdomen is soft.      Tenderness: There is no abdominal tenderness. There is no guarding or rebound.   Musculoskeletal:         General: No swelling or deformity.      Cervical back: Normal range of motion and neck supple. No rigidity.      Right lower leg: No edema.      Left lower leg: No edema.   Lymphadenopathy:      Cervical: No cervical adenopathy.   Skin:     General: Skin is warm and dry.   Neurological:      General: No focal deficit present.      Mental Status: He is alert and oriented to person, place, and time.      GCS: GCS eye subscore is 4. GCS verbal subscore is 5. GCS motor subscore is 6.         Procedures           ED Course      Labs Reviewed   COMPREHENSIVE METABOLIC PANEL - Abnormal; Notable for the following components:       Result Value    Glucose 130 (*)     Creatinine 0.73 (*)     Total Bilirubin 1.4 (*)     All other components within normal limits    Narrative:     GFR Normal >60  Chronic Kidney Disease <60  Kidney Failure <15    The GFR formula is only valid for adults with stable renal function between ages 18 and 70.   CBC WITH AUTO DIFFERENTIAL - Abnormal; Notable for the following  "components:    Lymphocyte % 14.8 (*)     Neutrophils, Absolute 7.51 (*)     Monocytes, Absolute 0.98 (*)     All other components within normal limits   D-DIMER, QUANTITATIVE - Normal    Narrative:     According to the assay 's published package insert, a normal (<0.50 MCGFEU/mL) D-dimer result in conjunction with a non-high clinical probability assessment, excludes deep vein thrombosis (DVT) and pulmonary embolism (PE) with high sensitivity.    D-dimer values increase with age and this can make VTE exclusion of an older population difficult. To address this, the American College of Physicians, based on best available evidence and recent guidelines, recommends that clinicians use age-adjusted D-dimer thresholds in patients greater than 50 years of age with: a) a low probability of PE who do not meet all Pulmonary Embolism Rule Out Criteria, or b) in those with intermediate probability of PE.   The formula for an age-adjusted D-dimer cut-off is \"age/100\".  For example, a 60 year old patient would have an age-adjusted cut-off of 0.60 MCGFEU/mL and an 80 year old 0.80 MCGFEU/mL.   SINGLE HS TROPONIN T - Normal    Narrative:     High Sensitive Troponin T Reference Range:  <14.0 ng/L- Negative Female for AMI  <22.0 ng/L- Negative Male for AMI  >=14 - Abnormal Female indicating possible myocardial injury.  >=22 - Abnormal Male indicating possible myocardial injury.   Clinicians would have to utilize clinical acumen, EKG, Troponin, and serial changes to determine if it is an Acute Myocardial Infarction or myocardial injury due to an underlying chronic condition.        CBC AND DIFFERENTIAL    Narrative:     The following orders were created for panel order CBC & Differential.  Procedure                               Abnormality         Status                     ---------                               -----------         ------                     CBC Auto Differential[[id number]]        Abnormal            " Final result                 Please view results for these tests on the individual orders.     XR Chest PA & Lateral    Result Date: 6/26/2024  Narrative: XR CHEST PA AND LATERAL-  Clinical: Recent COVID, pleuritic chest pain  COMPARISON: None  FINDINGS: No pneumothorax, pleural effusion or pulmonary edema is demonstrated. Cardiac size within normal limits. There is atherosclerotic calcification of the aorta. Very subtle areas of infiltrate or atelectasis demonstrated at the lung bases. Less than optimal inspiratory effort. The remainder is unremarkable.  This report was finalized on 6/26/2024 11:40 AM by Dr. Neo Lorenzo M.D on Workstation: [id number] reviewed by Mike Luna MD       Medical Decision Making  Labs/radiographic studies reviewed.  CXR: bibasilar atelectasis.  CBC/CMP: remarkable mild hyperbilirubinemia.  D dimer: neg.  hsTnT: neg x1.  EKG: NSR/rate 64/no acute STTW changes.  Wells PE score=0 (low risk).  Reproducible left chest wall tenderness with overlying ecchymoses likely secondary to costochondral separation vs nondisplaced rib fracture.  Plan norco 5mg qid prn (#8 tab)/mucinex dm prn.  (Nsaids contraindicated secondary to ASCVD/CAD).  Strict return precautions.    Amount and/or Complexity of Data Reviewed  Labs: ordered.  Radiology: ordered.  ECG/medicine tests: ordered.    Wells' Criteria for Pulmonary Embolism - MDCalc  Calculated on Jun 26 2024 11:46 AM  0.0 points -> Low risk group: 1.3% chance of PE in an ED population. Another study assigned scores ? 4 as “PE Unlikely” and had a 3% incidence of PE.    Final diagnoses:   Chest wall pain       ED Disposition  ED Disposition       ED Disposition   Discharge    Condition   Good    Comment   --               Myke Landaverde MD  [address]  [facility] [address] [phone]    In 1 day           Medication List        New Prescriptions      guaifenesin-dextromethorphan 600-30  mg  MG tablet sustained-release 12 hour  Take 1 tablet by mouth 2 (Two) Times a Day As Needed (cough).     HYDROcodone-acetaminophen 5-325 MG per tablet  Commonly known as: NORCO  Take 1 tablet by mouth Every 6 (Six) Hours As Needed for Mild Pain.               Where to Get Your Medications        [id number] - [place], [place] - [address] AT [facility] [address] [facility] [address] - [phone]  - [phone] FX  [address], [facility] [address]      Phone: [phone]   guaifenesin-dextromethorphan 600-30 mg  MG tablet sustained-release 12 hour  HYDROcodone-acetaminophen 5-325 MG per tablet

## 2024-06-27 LAB
QT INTERVAL: 405 MS
QTC INTERVAL: 418 MS

## 2024-07-16 RX ORDER — VALSARTAN 320 MG/1
320 TABLET ORAL DAILY
Qty: 90 TABLET | Refills: 1 | Status: SHIPPED | OUTPATIENT
Start: 2024-07-16

## 2024-07-17 RX ORDER — VALSARTAN 320 MG/1
320 TABLET ORAL DAILY
Qty: 30 TABLET | OUTPATIENT
Start: 2024-07-17

## 2024-08-15 ENCOUNTER — OFFICE VISIT (OUTPATIENT)
Dept: ORTHOPEDIC SURGERY | Facility: CLINIC | Age: 79
End: 2024-08-15
Payer: MEDICARE

## 2024-08-15 VITALS — TEMPERATURE: 98.6 F | BODY MASS INDEX: 31.24 KG/M2 | HEIGHT: 69 IN | WEIGHT: 210.9 LBS

## 2024-08-15 DIAGNOSIS — M25.562 BILATERAL CHRONIC KNEE PAIN: ICD-10-CM

## 2024-08-15 DIAGNOSIS — M17.0 PRIMARY OSTEOARTHRITIS OF BOTH KNEES: Primary | ICD-10-CM

## 2024-08-15 DIAGNOSIS — M25.561 BILATERAL CHRONIC KNEE PAIN: ICD-10-CM

## 2024-08-15 DIAGNOSIS — G89.29 BILATERAL CHRONIC KNEE PAIN: ICD-10-CM

## 2024-08-15 RX ORDER — LIDOCAINE HYDROCHLORIDE 10 MG/ML
2 INJECTION, SOLUTION EPIDURAL; INFILTRATION; INTRACAUDAL; PERINEURAL
Status: COMPLETED | OUTPATIENT
Start: 2024-08-15 | End: 2024-08-15

## 2024-08-15 RX ORDER — METHYLPREDNISOLONE ACETATE 80 MG/ML
1 INJECTION, SUSPENSION INTRA-ARTICULAR; INTRALESIONAL; INTRAMUSCULAR; SOFT TISSUE
Status: COMPLETED | OUTPATIENT
Start: 2024-08-15 | End: 2024-08-15

## 2024-08-15 RX ORDER — VALSARTAN 160 MG/1
1 TABLET ORAL DAILY
COMMUNITY
Start: 2024-05-03

## 2024-08-15 RX ADMIN — LIDOCAINE HYDROCHLORIDE 2 ML: 10 INJECTION, SOLUTION EPIDURAL; INFILTRATION; INTRACAUDAL; PERINEURAL at 10:05

## 2024-08-15 RX ADMIN — METHYLPREDNISOLONE ACETATE 1 ML: 80 INJECTION, SUSPENSION INTRA-ARTICULAR; INTRALESIONAL; INTRAMUSCULAR; SOFT TISSUE at 10:05

## 2024-08-15 NOTE — PROGRESS NOTES
[facility]              Follow Up      Patient Name: Jed Correia  : 1945  Primary Care Physician: Myke Landaverde MD        Chief Complaint: Bilateral knee pain    HPI:   Jed Correia is a 79 y.o. year old who presents today for evaluation.  Patient has a history of bilateral knee pain I last saw him in 2024.  He reports that he has been doing pretty well in general his right knee especially is doing much better than it was late last year earlier this year.  He is here today with new x-rays for further evaluation and treatment.  He is going on a river cruise through [place] in [place] soon.  No other significant changes or events since I last saw him otherwise.      Past Medical/Surgical, Social and Family History:  I have reviewed and/or updated pertinent history as noted in the medical record including:  Past Medical History:   Diagnosis Date    Ankle sprain     Arthritis     Arthritis of back     Arthritis of neck     Cancer     COLON, PROSTATE, SKIN    Cervical disc disorder     Coronary artery disease     Fracture, foot     H/O arthroscopy of left knee     H/O arthroscopy of right knee     Hyperlipidemia     Hypertension     Knee swelling     Positional sleep apnea 2019    Home sleep study.  AHI normal at 2/h for total monitoring time.  When supine, mildly abnormal at 6.4 events per hour.  No sleep-related hypoxia.    Tear of meniscus of knee     Tendinitis of knee     Thoracic disc disorder      Past Surgical History:   Procedure Laterality Date    CARDIAC CATHETERIZATION      stents    COLON SURGERY      COLON RESECTION, COLON CANCER REMOVAL    COLONOSCOPY N/A 2018    Procedure: COLONOSCOPY TO CECUM/TI WITH POLYPECTOMY ( COLD BX);  Surgeon: Moise Garcia MD;  [facility] END[facility]COPY;  Service: Gastroenterology    COLONOSCOPY N/A 2023    Procedure: COLONOSCOPY TO CECUM;  Surgeon: Ki Ash MD;  [place] [facility];  Service:  Gastroenterology;  Laterality: N/A;  POLYPS, HEMORRHOIDS    EYE SURGERY      CATARACTS, MACULAR HOLE REPAIR    KNEE ARTHROSCOPY Right 2020    Procedure: KNEE ARTHROSCOPY, PARTIAL MEDIAL AND LATERAL MENISECTOMY, AND DEBRIDEMENT OF ARTHRITIS;  Surgeon: Mercedes Falk MD;  [place] [facility]  Service: Orthopedics    KNEE SURGERY      PROSTATE SURGERY      CANCER    SKIN BIOPSY      5X-BASAL CELL CARCINOMA     Social History     Occupational History    Not on file   Tobacco Use    Smoking status: Former     Current packs/day: 0.00     Average packs/day: 0.5 packs/day for 17.3 years (8.7 ttl pk-yrs)     Types: Cigarettes, Pipe     Start date: 1960     Quit date: 1978     Years since quittin.6     Passive exposure: Past (family smoked in the  home)    Smokeless tobacco: Never    Tobacco comments:     Light smoker during this period   Vaping Use    Vaping status: Never Used   Substance and Sexual Activity    Alcohol use: Yes     Alcohol/week: 2.0 standard drinks of alcohol     Types: 2 Cans of beer per week     Comment: 2 drinks at social [employer]    Drug use: No    Sexual activity: Not Currently     Partners: Female     Comment: Prostate removal nerve damage.          Allergies:   Allergies   Allergen Reactions    Bacitracin-Polymyxin B Unknown - Low Severity    Iodine Rash    Latex Rash    Neomycin-Bacitracin Zn-Polymyx Unknown - Low Severity    Povidone-Iodine Rash       Medications:   Home Medications:  Current Outpatient Medications on File Prior to Visit   Medication Sig    aspirin 81 MG chewable tablet Chew 1 tablet Daily.    atorvastatin (LIPITOR) 10 MG tablet Take 1 tablet by mouth Every Night.    Cholecalciferol (VITAMIN D-3) 1000 UNITS capsule Take 2,000 Units by mouth Daily.    clopidogrel (PLAVIX) 75 MG tablet Take 1 tablet by mouth Daily.    Coenzyme Q10 (Co Q 10) 10 MG capsule Take 1 tablet by mouth Daily.    desonide (DESOWEN) 0.05 % lotion Apply 1 application topically to the  appropriate area as directed 2 (Two) Times a Day.    ezetimibe (ZETIA) 10 MG tablet Take 1 tablet by mouth Daily.    folic acid (FOLVITE) 1 MG tablet Take 1 tablet by mouth Daily.    gentamicin (GARAMYCIN) 0.1 % cream     glucosamine-chondroitin 500-400 MG capsule capsule Take 2 capsules by mouth Daily.    Influenza Vac High-Dose Quad (Fluzone High-Dose Quadrivalent) 0.7 ML suspension prefilled syringe injection Inject  into the appropriate muscle as directed by prescriber.    metoprolol succinate XL (TOPROL-XL) 25 MG 24 hr tablet TAKE 1/2 TABLET BY MOUTH DAILY. HOLD FOR HEART RATE LESS THEN 60 BPM.    Multiple Vitamins-Minerals (CENTRUM SILVER ADULT 50+ PO) Take 1 tablet by mouth Daily.    nitroglycerin (NITROSTAT) 0.4 MG SL tablet Place 1 tablet under the tongue Every 5 (Five) Minutes As Needed for Chest Pain.    Omega-3 Fatty Acids (FISH OIL) 1000 MG capsule capsule Take 1 capsule by mouth Daily With Breakfast.    pantoprazole (PROTONIX) 40 MG injection Daily.    Probiotic Product (PROBIOTIC DAILY PO) Take 1 capsule by mouth Daily.    valsartan (DIOVAN) 160 MG tablet Take 1 tablet by mouth Daily.    venlafaxine (EFFEXOR) 75 MG tablet Daily.    vitamin B-12 (CYANOCOBALAMIN) 100 MCG tablet Take 0.5 tablets by mouth Daily.    vitamin B-6 (PYRIDOXINE) 50 MG tablet Take 1 tablet by mouth Daily.    [DISCONTINUED] valsartan (DIOVAN) 320 MG tablet Take 1 tablet by mouth Daily.    [DISCONTINUED] guaifenesin-dextromethorphan 600-30 mg (MUCINEX DM)  MG tablet sustained-release 12 hour Take 1 tablet by mouth 2 (Two) Times a Day As Needed (cough).    [DISCONTINUED] HYDROcodone-acetaminophen (NORCO) 5-325 MG per tablet Take 1 tablet by mouth Every 6 (Six) Hours As Needed for Mild Pain.     No current facility-administered medications on file prior to visit.         ROS:  ROS negative except as listed in the HPI.    Physical Exam:   79 y.o. male  Body mass index is 31.14 kg/m²., 95.7 kg (210 lb 14.4 oz)  Vitals:     08/15/24 0949   Temp: 98.6 °F (37 °C)     General: Alert, cooperative, appears well and in no observable distress. Appears stated age and BMI as listed above.  HEENT: Normocephalic, atraumatic on external visual inspection.  CV: No significant peripheral edema.  Respiratory: Normal respiratory effort.  Skin: Warm & well perfused; appropriate skin turgor.  Psych: Appropriate mood & affect.  Neuro: Gross sensation and motor intact in affected extremity/extremities.  Vascular: Peripheral pulses palpable in affected extremity/extremities.     MSK Exam:  Bilateral Knee  No deformity or wounds appreciated. No significant redness or warmth.  Trace effusion noted  Tenderness along the joint line appreciated medial compartment, patellofemoral compartment  ROM 2-120 with pain at terminal motion. +crepitus  Ligamentous exam grossly stable  Quad strength 4-4+/5    Brief hip exam in the affected extremity(ies) grossly unremarkable.  Moves ankle and toes up and down, no significant pain or swelling in the foot, ankle or calf.        Radiology:    The following X-rays were ordered/reviewed today to evaluate the patient's symptoms: Bilateral Knee: AP standing, lateral, and sunrise of both knees show Degenerative changes bilaterally probably a bit more significant in the right than the left knee with more pronounced medial compartment narrowing.  Lateral views suggest some mild to moderate patellofemoral changes.  I compared these with prior films from 1 year ago and overall no significant changes in the interim..    Procedure:   See Procedure Note: The potential risks and benefits of performing a diagnostic and therapeutic injection were discussed with the patient prior to procedure. Risks include, but are not limited to infection, swelling, transient increase in pain, bleeding, bruising. Patient was advised that injections are a diagnostic and therapeutic tool meaning they may not alleviate symptoms at all, or may only provide  partial or temporary relief. Injection precautions and aftercare discussed. and Patient is currently on anticoagulation and/or a blood thinning agent which poses an increased risk of more significant bleeding or bruising following an injection. While joint injections are generally well tolerated even on AC, the patient was counseled on this increased risk and advised to monitor for signs of bleeding with proper follow up instructions. Injection precautions and aftercare discussed.      [facility] Data/Labs: N/A    Assessment & Plan:    ICD-10-CM ICD-9-CM   1. Primary osteoarthritis of both knees  M17.0 715.16   2. Bilateral chronic knee pain  M25.561 719.46    M25.562 338.29    G89.29      No orders of the defined types were placed in this encounter.    Orders Placed This Encounter   Procedures    Large Joint Arthrocentesis: R knee    Large Joint Arthrocentesis: L knee    XR Knee 3 View Bilateral     This is a 79-year-old male with bilateral knee pain and known degenerative changes.  He has been doing well with conservative management.  His right knee in particular tends to be a little more symptomatic he had arthroscopy almost 5 years ago.  He could at some point in his lifetime need to consider joint replacement.  We did talk about a referral to Dr. Conrad just to discuss his options.  However it does still seem like he is doing well with intermittent injections.  Plan is to proceed with the injections today which she tolerated well if he would like to sit down and discuss anything with Dr. Conrad I would be happy to facilitate getting him that referral otherwise am happy to see him back as needed for injections in the future.    Continue with activity modifications as needed/discussed.  Continue ICE and/or HEAT PRN.  Recommend to continue activity as tolerated, focus on quad and hip/core strengthening as well as gentle stretching.  Recommend lowering or maintaining BMI within a healthy range to reduce symptoms.    Patient encouraged to call with questions or concerns prior to follow up.  Will discuss with attending as needed.   Consider additional referrals, work up and/or advanced imaging as indicated or if patient fails to respond to conservative care.    Return if symptoms worsen or fail to improve.        KARIS Sue    Dictation software was used to complete a portion or all of this note.

## 2024-08-15 NOTE — PROGRESS NOTES
Large Joint Arthrocentesis: R knee  Date/Time: 8/15/2024 10:05 AM  Consent given by: patient  Site marked: site marked  Timeout: Immediately prior to procedure a time out was called to verify the correct patient, procedure, equipment, support staff and site/side marked as required   Supporting Documentation  Indications: pain   Procedure Details  Location: knee - R knee  Preparation: Patient was prepped and draped in the usual sterile fashion  Needle gauge: 21G.  Approach: anterolateral  Medications administered: 1 mL methylPREDNISolone acetate 80 MG/ML; 2 mL lidocaine PF 1% 1 %  Patient tolerance: patient tolerated the procedure well with no immediate complications      Large Joint Arthrocentesis: L knee  Date/Time: 8/15/2024 10:05 AM  Consent given by: patient  Site marked: site marked  Timeout: Immediately prior to procedure a time out was called to verify the correct patient, procedure, equipment, support staff and site/side marked as required   Supporting Documentation  Indications: pain   Procedure Details  Location: knee - L knee  Preparation: Patient was prepped and draped in the usual sterile fashion  Needle gauge: 21G.  Approach: anterolateral  Medications administered: 1 mL methylPREDNISolone acetate 80 MG/ML; 2 mL lidocaine PF 1% 1 %  Patient tolerance: patient tolerated the procedure well with no immediate complications

## 2024-10-30 ENCOUNTER — OFFICE VISIT (OUTPATIENT)
Dept: CARDIOLOGY | Facility: CLINIC | Age: 79
End: 2024-10-30
Payer: MEDICARE

## 2024-10-30 VITALS
HEIGHT: 69 IN | BODY MASS INDEX: 32.23 KG/M2 | DIASTOLIC BLOOD PRESSURE: 80 MMHG | HEART RATE: 66 BPM | OXYGEN SATURATION: 97 % | WEIGHT: 217.6 LBS | SYSTOLIC BLOOD PRESSURE: 148 MMHG

## 2024-10-30 DIAGNOSIS — R06.09 EXERTIONAL DYSPNEA: Primary | ICD-10-CM

## 2024-10-30 PROCEDURE — 1159F MED LIST DOCD IN RCRD: CPT | Performed by: INTERNAL MEDICINE

## 2024-10-30 PROCEDURE — 1160F RVW MEDS BY RX/DR IN RCRD: CPT | Performed by: INTERNAL MEDICINE

## 2024-10-30 PROCEDURE — 99214 OFFICE O/P EST MOD 30 MIN: CPT | Performed by: INTERNAL MEDICINE

## 2024-10-30 NOTE — H&P (VIEW-ONLY)
[facility]      Patient Name: Jed Correia  :1945  Age: 79 y.o.  Encounter Provider:  Jed Moody Jr, MD      Chief Complaint: Initial evaluation of patient with coronary artery disease      HPI  Jed Correia is a 79 y.o. male past medical history of coronary artery disease status post PCI  and premature atrial contractions who presents for follow-up evaluation.      Last clinic visit note: Patient previously followed by Dr. Flaco Stearns of the [place].  Intervention in  with no subsequent needs for intervention.  He is a very active gentleman walks about 2 miles or rides the stationary bike for 9 to 10 miles daily.  No chest pain or shortness of air.  No orthopnea, PND or edema.  He has occasional palpitations and irregular heart rate has been a chronic issue for him.  Holter monitor in 2022 showed 15% PACs but no sustained arrhythmias.  He denies dizziness syncope.  He has a history of carotid stenosis with 50 to 69% on the left on ultrasound carotids .  No focal neurological deficits or other neurological complaints.  He is an ex-smoker quit , drinks socially and denies illicit drug use.  History of brother with myocardial infarction followed by CABG.    Doing well since last visit.  He follows with vascular surgery.  Last LDL 57.  He notes more shortness of breath with activity.  He is currently scheduled to have PFTs and pulmonary follow-up.  He states that even though he is active at home he gets much more short of breath with stairs.  He denies orthopnea, PND or edema.  No palpitations, dizziness or syncope.  He did have an ER visit for chest pain in  which showed unremarkable workup.    The following portions of the patient's history were reviewed and updated as appropriate: allergies, current medications, past family history, past medical history, past social history, past surgical history and problem list.      Review of  "Systems   Constitutional: Negative for chills and fever.   HENT:  Negative for hoarse voice and sore throat.    Eyes:  Negative for double vision and photophobia.   Cardiovascular:  Positive for palpitations. Negative for chest pain, leg swelling, near-syncope, orthopnea, paroxysmal nocturnal dyspnea and syncope.   Respiratory:  Negative for cough and wheezing.    Skin:  Negative for poor wound healing and rash.   Musculoskeletal:  Negative for arthritis and joint swelling.   Gastrointestinal:  Negative for bloating, abdominal pain, hematemesis and hematochezia.   Neurological:  Negative for dizziness and focal weakness.   Psychiatric/Behavioral:  Negative for depression and suicidal ideas.        OBJECTIVE:   Vital Signs  Vitals:    10/30/24 0907   BP: 148/80   Pulse: 66   SpO2: 97%     Estimated body mass index is 32.13 kg/m² as calculated from the following:    Height as of this encounter: 175.3 cm (69\").    Weight as of this encounter: 98.7 kg (217 lb 9.6 oz).    Vitals reviewed.   Constitutional:       Appearance: Healthy appearance. Not in distress.   Neck:      Vascular: No JVR. JVD normal.   Pulmonary:      Effort: Pulmonary effort is normal.      Breath sounds: Normal breath sounds. No wheezing. No rhonchi. No rales.   Chest:      Chest wall: Not tender to palpatation.   Cardiovascular:      PMI at left midclavicular line. Normal rate. Regular rhythm. Normal S1. Normal S2.       Murmurs: There is a systolic murmur.      No gallop.  No click. No rub.   Pulses:     Intact distal pulses.   Edema:     Peripheral edema absent.   Abdominal:      General: Bowel sounds are normal.      Palpations: Abdomen is soft.      Tenderness: There is no abdominal tenderness.   Musculoskeletal: Normal range of motion.         General: No tenderness. Skin:     General: Skin is warm and dry.   Neurological:      General: No focal deficit present.      Mental Status: Alert and oriented to person, place and time. "       Procedures            ASSESSMENT:     CAD without angina  Carotid stenosis asymptomatic  Premature atrial contractions  Hypertension  Dyslipidemia    PLAN OF CARE:     CAD without angina -currently on dual antiplatelet therapy, statin.  Tolerating medical therapy well.  Good functional capacity with no angina.  Chest pain workup in ER in June.  Now with increasing dyspnea on exertion.  He cannot perform at least 5 METS.  Plan for nuclear stress study.  Carotid stenosis asymptomatic -repeat lipids.  He follows with vascular surgery.  Premature atrial contractions -minimal impact on quality of life.  No sustained arrhythmias on recent Holter.  Monitor clinical progress.  Hypertension -elevated today in clinic.  Sodium restricted diet.  Better controlled on home blood pressure log.  He will send me another 1 after a week.  Dyslipidemia -as above  Murmur -mild aortic insufficiency on last echo.  Euvolemic in clinic today.    Return to clinic 12 months             Discharge Medications            Accurate as of October 30, 2024  9:09 AM. If you have any questions, ask your nurse or doctor.                Continue These Medications        Instructions Start Date   aspirin 81 MG chewable tablet   81 mg, Oral, Daily      atorvastatin 10 MG tablet  Commonly known as: LIPITOR   10 mg, Oral, Nightly      clopidogrel 75 MG tablet  Commonly known as: PLAVIX   75 mg, Oral, Daily      Co Q 10 10 MG capsule   1 tablet, Oral, Daily      desonide 0.05 % lotion  Commonly known as: DESOWEN   2 Times Daily      ezetimibe 10 MG tablet  Commonly known as: ZETIA   10 mg, Oral, Daily      fish oil 1000 MG capsule capsule   1,000 mg, Daily With Breakfast      Fluzone High-Dose Quadrivalent 0.7 ML suspension prefilled syringe injection  Generic drug: Influenza Vac High-Dose Quad   Intramuscular      folic acid 1 MG tablet  Commonly known as: FOLVITE   1 mg, Daily      gentamicin 0.1 % cream  Commonly known as: GARAMYCIN        glucosamine-chondroitin 500-400 MG capsule capsule   2 capsules, Daily      metoprolol succinate XL 25 MG 24 hr tablet  Commonly known as: TOPROL-XL   TAKE 1/2 TABLET BY MOUTH DAILY. HOLD FOR HEART RATE LESS THEN 60 BPM.      multivitamin with minerals tablet tablet   1 tablet, Daily      nitroglycerin 0.4 MG SL tablet  Commonly known as: NITROSTAT   0.4 mg, Sublingual, Every 5 Minutes PRN      pantoprazole 40 MG injection  Commonly known as: PROTONIX   Every 24 Hours      PROBIOTIC DAILY PO   1 capsule, Daily      valsartan 320 MG tablet  Commonly known as: DIOVAN   320 mg, Oral, Daily      venlafaxine 75 MG tablet  Commonly known as: EFFEXOR   Every 24 Hours      vitamin B-12 100 MCG tablet  Commonly known as: CYANOCOBALAMIN   50 mcg, Daily      vitamin B-6 50 MG tablet  Commonly known as: PYRIDOXINE   50 mg, Daily      Vitamin D-3 25 MCG (1000 UT) capsule   2,000 Units, Daily               Thank you for allowing me to participate in the care of your patient,      Sincerely,   Jed Moody MD  [facility]  10/30/24  09:09 EDT

## 2024-10-30 NOTE — PROGRESS NOTES
[facility]      Patient Name: Jed Correia  :1945  Age: 79 y.o.  Encounter Provider:  Jed Moody Jr, MD      Chief Complaint: Initial evaluation of patient with coronary artery disease      HPI  Jed Correia is a 79 y.o. male past medical history of coronary artery disease status post PCI  and premature atrial contractions who presents for follow-up evaluation.      Last clinic visit note: Patient previously followed by Dr. Flaco Stearns of the [place] [facility].  Intervention in  with no subsequent needs for intervention.  He is a very active gentleman walks about 2 miles or rides the stationary bike for 9 to 10 miles daily.  No chest pain or shortness of air.  No orthopnea, PND or edema.  He has occasional palpitations and irregular heart rate has been a chronic issue for him.  Holter monitor in 2022 showed 15% PACs but no sustained arrhythmias.  He denies dizziness syncope.  He has a history of carotid stenosis with 50 to 69% on the left on ultrasound carotids .  No focal neurological deficits or other neurological complaints.  He is an ex-smoker quit , drinks socially and denies illicit drug use.  History of brother with myocardial infarction followed by CABG.    Doing well since last visit.  He follows with vascular surgery.  Last LDL 57.  He notes more shortness of breath with activity.  He is currently scheduled to have PFTs and pulmonary follow-up.  He states that even though he is active at home he gets much more short of breath with stairs.  He denies orthopnea, PND or edema.  No palpitations, dizziness or syncope.  He did have an ER visit for chest pain in  which showed unremarkable workup.    The following portions of the patient's history were reviewed and updated as appropriate: allergies, current medications, past family history, past medical history, past social history, past surgical history and problem list.      Review of  "Systems   Constitutional: Negative for chills and fever.   HENT:  Negative for hoarse voice and sore throat.    Eyes:  Negative for double vision and photophobia.   Cardiovascular:  Positive for palpitations. Negative for chest pain, leg swelling, near-syncope, orthopnea, paroxysmal nocturnal dyspnea and syncope.   Respiratory:  Negative for cough and wheezing.    Skin:  Negative for poor wound healing and rash.   Musculoskeletal:  Negative for arthritis and joint swelling.   Gastrointestinal:  Negative for bloating, abdominal pain, hematemesis and hematochezia.   Neurological:  Negative for dizziness and focal weakness.   Psychiatric/Behavioral:  Negative for depression and suicidal ideas.        OBJECTIVE:   Vital Signs  Vitals:    10/30/24 0907   BP: 148/80   Pulse: 66   SpO2: 97%     Estimated body mass index is 32.13 kg/m² as calculated from the following:    Height as of this encounter: 175.3 cm (69\").    Weight as of this encounter: 98.7 kg (217 lb 9.6 oz).    Vitals reviewed.   Constitutional:       Appearance: Healthy appearance. Not in distress.   Neck:      Vascular: No JVR. JVD normal.   Pulmonary:      Effort: Pulmonary effort is normal.      Breath sounds: Normal breath sounds. No wheezing. No rhonchi. No rales.   Chest:      Chest wall: Not tender to palpatation.   Cardiovascular:      PMI at left midclavicular line. Normal rate. Regular rhythm. Normal S1. Normal S2.       Murmurs: There is a systolic murmur.      No gallop.  No click. No rub.   Pulses:     Intact distal pulses.   Edema:     Peripheral edema absent.   Abdominal:      General: Bowel sounds are normal.      Palpations: Abdomen is soft.      Tenderness: There is no abdominal tenderness.   Musculoskeletal: Normal range of motion.         General: No tenderness. Skin:     General: Skin is warm and dry.   Neurological:      General: No focal deficit present.      Mental Status: Alert and oriented to person, place and time. "       Procedures            ASSESSMENT:     CAD without angina  Carotid stenosis asymptomatic  Premature atrial contractions  Hypertension  Dyslipidemia    PLAN OF CARE:     CAD without angina -currently on dual antiplatelet therapy, statin.  Tolerating medical therapy well.  Good functional capacity with no angina.  Chest pain workup in ER in June.  Now with increasing dyspnea on exertion.  He cannot perform at least 5 METS.  Plan for nuclear stress study.  Carotid stenosis asymptomatic -repeat lipids.  He follows with vascular surgery.  Premature atrial contractions -minimal impact on quality of life.  No sustained arrhythmias on recent Holter.  Monitor clinical progress.  Hypertension -elevated today in clinic.  Sodium restricted diet.  Better controlled on home blood pressure log.  He will send me another 1 after a week.  Dyslipidemia -as above  Murmur -mild aortic insufficiency on last echo.  Euvolemic in clinic today.    Return to clinic 12 months             Discharge Medications            Accurate as of October 30, 2024  9:09 AM. If you have any questions, ask your nurse or doctor.                Continue These Medications        Instructions Start Date   aspirin 81 MG chewable tablet   81 mg, Oral, Daily      atorvastatin 10 MG tablet  Commonly known as: LIPITOR   10 mg, Oral, Nightly      clopidogrel 75 MG tablet  Commonly known as: PLAVIX   75 mg, Oral, Daily      Co Q 10 10 MG capsule   1 tablet, Oral, Daily      desonide 0.05 % lotion  Commonly known as: DESOWEN   2 Times Daily      ezetimibe 10 MG tablet  Commonly known as: ZETIA   10 mg, Oral, Daily      fish oil 1000 MG capsule capsule   1,000 mg, Daily With Breakfast      Fluzone High-Dose Quadrivalent 0.7 ML suspension prefilled syringe injection  Generic drug: Influenza Vac High-Dose Quad   Intramuscular      folic acid 1 MG tablet  Commonly known as: FOLVITE   1 mg, Daily      gentamicin 0.1 % cream  Commonly known as: GARAMYCIN        glucosamine-chondroitin 500-400 MG capsule capsule   2 capsules, Daily      metoprolol succinate XL 25 MG 24 hr tablet  Commonly known as: TOPROL-XL   TAKE 1/2 TABLET BY MOUTH DAILY. HOLD FOR HEART RATE LESS THEN 60 BPM.      multivitamin with minerals tablet tablet   1 tablet, Daily      nitroglycerin 0.4 MG SL tablet  Commonly known as: NITROSTAT   0.4 mg, Sublingual, Every 5 Minutes PRN      pantoprazole 40 MG injection  Commonly known as: PROTONIX   Every 24 Hours      PROBIOTIC DAILY PO   1 capsule, Daily      valsartan 320 MG tablet  Commonly known as: DIOVAN   320 mg, Oral, Daily      venlafaxine 75 MG tablet  Commonly known as: EFFEXOR   Every 24 Hours      vitamin B-12 100 MCG tablet  Commonly known as: CYANOCOBALAMIN   50 mcg, Daily      vitamin B-6 50 MG tablet  Commonly known as: PYRIDOXINE   50 mg, Daily      Vitamin D-3 25 MCG (1000 UT) capsule   2,000 Units, Daily               Thank you for allowing me to participate in the care of your patient,      Sincerely,   Jed Moody MD  [facility]  10/30/24  09:09 EDT

## 2024-10-31 ENCOUNTER — TELEPHONE (OUTPATIENT)
Dept: CARDIOLOGY | Facility: CLINIC | Age: 79
End: 2024-10-31
Payer: MEDICARE

## 2024-10-31 RX ORDER — AMLODIPINE BESYLATE 5 MG/1
5 TABLET ORAL DAILY
Qty: 30 TABLET | Refills: 11 | Status: SHIPPED | OUTPATIENT
Start: 2024-10-31

## 2024-10-31 NOTE — TELEPHONE ENCOUNTER
Pt says that the episodes woke him up.  He says he could feel his heart was beating really hard.  When he got OOB he wasn't very steady on his feet, and he could hear his blood pulsing through his neck when he went to go urinate.  Pt had a little bit of a HA.  His HR was in the 70s during the episode.  Pt says he's had a couple of these episodes before and they always happen when he's sleeping and wakes up.  It's been in the recent weeks when he has these episodes.    Do you have any recommendations for this patient?    Thank you,    Stacy MULLINS RN  [facility] Triage  10/31/24  13:43 EDT

## 2024-10-31 NOTE — TELEPHONE ENCOUNTER
Spoke to patient about nighttime episodes and his blood pressure has been running in the 150s over 90s during the day.  I will start him on amlodipine 5 mg.  Stress study was ordered.

## 2024-10-31 NOTE — TELEPHONE ENCOUNTER
"Pt called stating he had \"2 episodes last night\" where it woke him up out of his sleep.  He feels this could be related to sleep apnea but he has not been tested yet.  He checked his BP both times and it was 183/90, 198/93. He had an appointment with Dr. Moody yesterday.  He has an upcoming appointment for a nuc med stress test(11/14).  Can you please call to triage?  Thanks/jlf  "

## 2024-11-05 ENCOUNTER — LAB (OUTPATIENT)
Dept: LAB | Facility: HOSPITAL | Age: 79
End: 2024-11-05
Payer: MEDICARE

## 2024-11-05 ENCOUNTER — TELEPHONE (OUTPATIENT)
Dept: CARDIOLOGY | Facility: CLINIC | Age: 79
End: 2024-11-05
Payer: MEDICARE

## 2024-11-05 ENCOUNTER — TRANSCRIBE ORDERS (OUTPATIENT)
Dept: CARDIOLOGY | Facility: CLINIC | Age: 79
End: 2024-11-05
Payer: MEDICARE

## 2024-11-05 DIAGNOSIS — I20.0 UNSTABLE ANGINA: Primary | ICD-10-CM

## 2024-11-05 DIAGNOSIS — Z01.810 PRE-OPERATIVE CARDIOVASCULAR EXAMINATION: ICD-10-CM

## 2024-11-05 DIAGNOSIS — Z13.6 SCREENING FOR ISCHEMIC HEART DISEASE: ICD-10-CM

## 2024-11-05 DIAGNOSIS — Z13.6 SCREENING FOR ISCHEMIC HEART DISEASE: Primary | ICD-10-CM

## 2024-11-05 LAB
ANION GAP SERPL CALCULATED.3IONS-SCNC: 9 MMOL/L (ref 5–15)
BASOPHILS # BLD AUTO: 0.07 10*3/MM3 (ref 0–0.2)
BASOPHILS NFR BLD AUTO: 1 % (ref 0–1.5)
BUN SERPL-MCNC: 10 MG/DL (ref 8–23)
BUN/CREAT SERPL: 15.4 (ref 7–25)
CALCIUM SPEC-SCNC: 9.4 MG/DL (ref 8.6–10.5)
CHLORIDE SERPL-SCNC: 105 MMOL/L (ref 98–107)
CO2 SERPL-SCNC: 26 MMOL/L (ref 22–29)
CREAT SERPL-MCNC: 0.65 MG/DL (ref 0.76–1.27)
DEPRECATED RDW RBC AUTO: 46 FL (ref 37–54)
EGFRCR SERPLBLD CKD-EPI 2021: 95.3 ML/MIN/1.73
EOSINOPHIL # BLD AUTO: 0.19 10*3/MM3 (ref 0–0.4)
EOSINOPHIL NFR BLD AUTO: 2.6 % (ref 0.3–6.2)
ERYTHROCYTE [DISTWIDTH] IN BLOOD BY AUTOMATED COUNT: 13.2 % (ref 12.3–15.4)
GLUCOSE SERPL-MCNC: 118 MG/DL (ref 65–99)
HCT VFR BLD AUTO: 43.5 % (ref 37.5–51)
HGB BLD-MCNC: 13.8 G/DL (ref 13–17.7)
IMM GRANULOCYTES # BLD AUTO: 0.03 10*3/MM3 (ref 0–0.05)
IMM GRANULOCYTES NFR BLD AUTO: 0.4 % (ref 0–0.5)
LYMPHOCYTES # BLD AUTO: 1.8 10*3/MM3 (ref 0.7–3.1)
LYMPHOCYTES NFR BLD AUTO: 24.5 % (ref 19.6–45.3)
MCH RBC QN AUTO: 29.9 PG (ref 26.6–33)
MCHC RBC AUTO-ENTMCNC: 31.7 G/DL (ref 31.5–35.7)
MCV RBC AUTO: 94.4 FL (ref 79–97)
MONOCYTES # BLD AUTO: 0.94 10*3/MM3 (ref 0.1–0.9)
MONOCYTES NFR BLD AUTO: 12.8 % (ref 5–12)
NEUTROPHILS NFR BLD AUTO: 4.33 10*3/MM3 (ref 1.7–7)
NEUTROPHILS NFR BLD AUTO: 58.7 % (ref 42.7–76)
NRBC BLD AUTO-RTO: 0 /100 WBC (ref 0–0.2)
PLATELET # BLD AUTO: 219 10*3/MM3 (ref 140–450)
PMV BLD AUTO: 9.6 FL (ref 6–12)
POTASSIUM SERPL-SCNC: 4.2 MMOL/L (ref 3.5–5.2)
RBC # BLD AUTO: 4.61 10*6/MM3 (ref 4.14–5.8)
SODIUM SERPL-SCNC: 140 MMOL/L (ref 136–145)
WBC NRBC COR # BLD AUTO: 7.36 10*3/MM3 (ref 3.4–10.8)

## 2024-11-05 PROCEDURE — 85025 COMPLETE CBC W/AUTO DIFF WBC: CPT

## 2024-11-05 PROCEDURE — 36415 COLL VENOUS BLD VENIPUNCTURE: CPT

## 2024-11-05 PROCEDURE — 80048 BASIC METABOLIC PNL TOTAL CA: CPT

## 2024-11-05 RX ORDER — PREDNISONE 50 MG/1
50 TABLET ORAL TAKE AS DIRECTED
Qty: 3 TABLET | Refills: 0 | Status: ON HOLD | OUTPATIENT
Start: 2024-11-05 | End: 2024-11-07

## 2024-11-05 NOTE — TELEPHONE ENCOUNTER
Patient having more frequent episodes of severe dyspnea that are acute and resolve after 10 to 15 minutes.  He is having more trouble with exertional activity.  I am very concerned about an atypical anginal equivalent and think that his pretest probability for obstructive coronary disease is too high to accept a negative stress result.  We will plan for cardiac catheterization as soon as possible.

## 2024-11-06 ENCOUNTER — OFFICE VISIT (OUTPATIENT)
Facility: HOSPITAL | Age: 79
End: 2024-11-06
Payer: MEDICARE

## 2024-11-06 VITALS — BODY MASS INDEX: 31.7 KG/M2 | HEART RATE: 69 BPM | WEIGHT: 214 LBS | OXYGEN SATURATION: 95 % | HEIGHT: 69 IN

## 2024-11-06 DIAGNOSIS — G47.19 EXCESSIVE DAYTIME SLEEPINESS: ICD-10-CM

## 2024-11-06 DIAGNOSIS — E66.09 CLASS 1 OBESITY DUE TO EXCESS CALORIES WITHOUT SERIOUS COMORBIDITY WITH BODY MASS INDEX (BMI) OF 31.0 TO 31.9 IN ADULT: ICD-10-CM

## 2024-11-06 DIAGNOSIS — G47.33 OSA (OBSTRUCTIVE SLEEP APNEA): Primary | ICD-10-CM

## 2024-11-06 DIAGNOSIS — E66.811 CLASS 1 OBESITY DUE TO EXCESS CALORIES WITHOUT SERIOUS COMORBIDITY WITH BODY MASS INDEX (BMI) OF 31.0 TO 31.9 IN ADULT: ICD-10-CM

## 2024-11-06 PROCEDURE — G0463 HOSPITAL OUTPT CLINIC VISIT: HCPCS

## 2024-11-06 NOTE — PROGRESS NOTES
Patient Care Team:  Myke Landaverde MD as PCP - General (Internal Medicine)  Reg, Nayan GORDON MD as Consulting Physician (Sleep Medicine)  Tito Stearns MD as Consulting Physician (Cardiology)  Jed Moody Jr., MD as Consulting Physician (Cardiology)  Rita Hernandez MD, MPH as Consulting Physician (Sleep Medicine)        History of Present Illness  The patient presents for evaluation of sleep apnea.    He has previously consulted with Dr. Finney and undergone sleep tests, which revealed positional sleep apnea when he sleeps on his back. He uses an oral appliance and attempts to avoid sleeping on his back, but finds it challenging. His wife, who shares the bedroom with him, has observed that he sometimes sleeps with his mouth open and snores lightly. Despite going to bed at 11:30 PM and waking up at 8:30 AM, he does not feel rested and experiences grogginess for the first 10 to 15 minutes after waking. He reports having vivid dreams, but not nightmares.    Recently, he has experienced episodes of waking up at night with shallow breathing and a rapid, somewhat irregular heartbeat. He has also noticed a whooshing sound in his ears. His blood pressure readings have been high, around 190/90. He is scheduled for a heart catheterization tomorrow.    A fall a few years ago resulted in shoulder tenderness, making it difficult for him to sleep on his side. He has gained some weight recently. His primary care physician has scheduled a pulmonary test for next Thursday.    He consumes two mugs of coffee in the morning.    SOCIAL HISTORY  He used tobacco for a while when he was younger. He drinks about 2 drinks per week.       [place]: 5    Data Reviewed: Prior sleep notes from Dr. Finney prior sleep study, medical chart and sleep questionnaire      PMH:  Past Medical History:   Diagnosis Date    Ankle sprain     Arthritis     Arthritis of back     Arthritis of neck     Cancer     COLON,  PROSTATE, SKIN    Cervical disc disorder     Coronary artery disease     Fracture, foot 1993    H/O arthroscopy of left knee     H/O arthroscopy of right knee     Hyperlipidemia     Hypertension     Knee swelling 2023    Positional sleep apnea 09/18/2019    Home sleep study.  AHI normal at 2/h for total monitoring time.  When supine, mildly abnormal at 6.4 events per hour.  No sleep-related hypoxia.    Tear of meniscus of knee     Tendinitis of knee 2023    Thoracic disc disorder           Allergies:  Bacitracin-polymyxin b, Iodine, Latex, Neomycin-bacitracin zn-polymyx, and Povidone-iodine     Medication Review:   Current Outpatient Medications on File Prior to Visit   Medication Sig Dispense Refill    amLODIPine (NORVASC) 5 MG tablet Take 1 tablet by mouth Daily. 30 tablet 11    aspirin 81 MG chewable tablet Chew 1 tablet Daily. 90 tablet 3    atorvastatin (LIPITOR) 10 MG tablet Take 1 tablet by mouth Every Night. 90 tablet 3    Cholecalciferol (VITAMIN D-3) 1000 UNITS capsule Take 2,000 Units by mouth Daily.      clopidogrel (PLAVIX) 75 MG tablet Take 1 tablet by mouth Daily. 90 tablet 3    Coenzyme Q10 (Co Q 10) 10 MG capsule Take 1 tablet by mouth Daily. 90 each 3    desonide (DESOWEN) 0.05 % lotion Apply 1 application topically to the appropriate area as directed 2 (Two) Times a Day.      ezetimibe (ZETIA) 10 MG tablet Take 1 tablet by mouth Daily. 90 tablet 3    folic acid (FOLVITE) 1 MG tablet Take 1 tablet by mouth Daily.      gentamicin (GARAMYCIN) 0.1 % cream       glucosamine-chondroitin 500-400 MG capsule capsule Take 2 capsules by mouth Daily.      Influenza Vac High-Dose Quad (Fluzone High-Dose Quadrivalent) 0.7 ML suspension prefilled syringe injection Inject  into the appropriate muscle as directed by prescriber. 0.7 mL 0    metoprolol succinate XL (TOPROL-XL) 25 MG 24 hr tablet TAKE 1/2 TABLET BY MOUTH DAILY. HOLD FOR HEART RATE LESS THEN 60 BPM. 90 tablet 1    Multiple Vitamins-Minerals (CENTRUM  "SILVER ADULT 50+ PO) Take 1 tablet by mouth Daily.      nitroglycerin (NITROSTAT) 0.4 MG SL tablet Place 1 tablet under the tongue Every 5 (Five) Minutes As Needed for Chest Pain. 15 tablet 11    Omega-3 Fatty Acids (FISH OIL) 1000 MG capsule capsule Take 1 capsule by mouth Daily With Breakfast.      pantoprazole (PROTONIX) 40 MG injection Daily.      predniSONE (DELTASONE) 50 MG tablet Take 1 tablet by mouth Take As Directed for 3 doses. Take 1 tablet (50mg) 13 Hours 7 Hours & 1 Hour Prior to Exam 3 tablet 0    Probiotic Product (PROBIOTIC DAILY PO) Take 1 capsule by mouth Daily.      valsartan (DIOVAN) 320 MG tablet Take 1 tablet by mouth Daily.      venlafaxine (EFFEXOR) 75 MG tablet Daily.      vitamin B-12 (CYANOCOBALAMIN) 100 MCG tablet Take 0.5 tablets by mouth Daily.      vitamin B-6 (PYRIDOXINE) 50 MG tablet Take 1 tablet by mouth Daily.       No current facility-administered medications on file prior to visit.         Vital Signs:    Vitals:    11/06/24 1354   Pulse: 69   SpO2: 95%   Weight: 97.1 kg (214 lb)   Height: 175.3 cm (69\")        Body mass index is 31.6 kg/m².     .BMIFOLLOWUP  BMI is >= 30 and <35. (Class 1 Obesity). The following options were offered after discussion;: referral to primary care      Physical Exam:    Constitutional:  Well developed 79 y.o. male that appears in no apparent distress.  Awake & oriented times 3.  Normal mood with normal recent and remote memory and normal judgement.  Eyes:  Conjunctivae normal.  Oropharynx: Moist mucous membranes without exudate and Mallampati 3  Neck: Trachea midline  Respiratory: Effort is not labored  Cardiovascular: Radial pulse regular  Musculoskeletal: Gait appears normal, no digital clubbing evident, no pre-tibial edema        Impression:   Encounter Diagnoses   Name Primary?    SHARA (obstructive sleep apnea) Yes    Excessive daytime sleepiness     Class 1 obesity due to excess calories without serious comorbidity with body mass index (BMI) of " 31.0 to 31.9 in adult       Patient's BMI is Body mass index is 31.6 kg/m².        Assessment & Plan  1. Sleep Apnea.  The patient reports waking up at night with shallow breathing, rapid and somewhat irregular heartbeat, and high blood pressure readings (190/90). He has a history of positional sleep apnea diagnosed in 2019 and uses an oral appliance. Given the recent symptoms, a home sleep test is recommended to determine if sleep apnea has worsened. The patient will wear his mandibular advancement device during the test. If the home sleep test shows high snoring without much apnea, an in-lab study may be considered due to the potential for undercounting. A home sleep test has been ordered.    I gave consideration to an in-lab study because I am concerned about the possibility that he might have respiratory effort related arousals and will be uncounted with a home sleep test but the delay in the polysomnogram would be over a month and a since there is an urgency and so we will proceed with a home sleep apnea test.    2. Hypertension.  The patient reports recent high blood pressure readings (190/90) during episodes of shallow breathing and rapid heartbeat at night. He is advised to monitor his blood pressure regularly and report any significant changes. Further evaluation and management will depend on the results of the home sleep test and heart catheterization scheduled for tomorrow.    3. Weight Gain.  The patient has gained weight recently, which may contribute to his sleep apnea and hypertension. He is advised to adopt a healthier diet and increase physical activity to manage his weight.         The patient should practice good sleep hygiene measures.      Weight loss might be beneficial in this patient who has a Body mass index is 31.6 kg/m².      Pathophysiology of SHARA described to the patient.  Cardiovascular complications of untreated SHARA also reviewed.      The patient was cautioned about the dangers of  drowsy driving.    Patient or patient representative verbalized consent for the use of Ambient Listening during the visit with  Solomon Hernandez MD for chart documentation. 11/6/2024  14:18 EST     Solomon Hernandez MD  Sleep Medicine  11/06/24  14:16 EST

## 2024-11-07 ENCOUNTER — HOSPITAL ENCOUNTER (OUTPATIENT)
Facility: HOSPITAL | Age: 79
Setting detail: OBSERVATION
Discharge: HOME | End: 2024-11-08
Attending: INTERNAL MEDICINE | Admitting: INTERNAL MEDICINE
Payer: MEDICARE

## 2024-11-07 DIAGNOSIS — I20.0 UNSTABLE ANGINA: ICD-10-CM

## 2024-11-07 LAB
QT INTERVAL: 378 MS
QT INTERVAL: 400 MS
QTC INTERVAL: 417 MS
QTC INTERVAL: 438 MS

## 2024-11-07 PROCEDURE — A9270 NON-COVERED ITEM OR SERVICE: HCPCS | Performed by: INTERNAL MEDICINE

## 2024-11-07 PROCEDURE — G0378 HOSPITAL OBSERVATION PER HR: HCPCS

## 2024-11-07 PROCEDURE — 25010000002 HEPARIN (PORCINE) PER 1000 UNITS: Performed by: INTERNAL MEDICINE

## 2024-11-07 PROCEDURE — 63710000001 ATORVASTATIN 10 MG TABLET: Performed by: PHYSICIAN ASSISTANT

## 2024-11-07 PROCEDURE — 93458 L HRT ARTERY/VENTRICLE ANGIO: CPT | Performed by: INTERNAL MEDICINE

## 2024-11-07 PROCEDURE — 25010000002 LIDOCAINE 2% SOLUTION: Performed by: INTERNAL MEDICINE

## 2024-11-07 PROCEDURE — 63710000001 ASPIRIN 81 MG TABLET DELAYED-RELEASE: Performed by: INTERNAL MEDICINE

## 2024-11-07 PROCEDURE — C1887 CATHETER, GUIDING: HCPCS | Performed by: INTERNAL MEDICINE

## 2024-11-07 PROCEDURE — C1894 INTRO/SHEATH, NON-LASER: HCPCS | Performed by: INTERNAL MEDICINE

## 2024-11-07 PROCEDURE — C1769 GUIDE WIRE: HCPCS | Performed by: INTERNAL MEDICINE

## 2024-11-07 PROCEDURE — 25010000002 FENTANYL CITRATE (PF) 50 MCG/ML SOLUTION: Performed by: INTERNAL MEDICINE

## 2024-11-07 PROCEDURE — 93005 ELECTROCARDIOGRAM TRACING: CPT | Performed by: INTERNAL MEDICINE

## 2024-11-07 PROCEDURE — C1725 CATH, TRANSLUMIN NON-LASER: HCPCS | Performed by: INTERNAL MEDICINE

## 2024-11-07 PROCEDURE — 63710000001 CLOPIDOGREL 75 MG TABLET: Performed by: INTERNAL MEDICINE

## 2024-11-07 PROCEDURE — 85347 COAGULATION TIME ACTIVATED: CPT

## 2024-11-07 PROCEDURE — C1874 STENT, COATED/COV W/DEL SYS: HCPCS | Performed by: INTERNAL MEDICINE

## 2024-11-07 PROCEDURE — A9270 NON-COVERED ITEM OR SERVICE: HCPCS | Performed by: PHYSICIAN ASSISTANT

## 2024-11-07 PROCEDURE — 63710000001 VITAMIN B-6 50 MG TABLET: Performed by: INTERNAL MEDICINE

## 2024-11-07 PROCEDURE — C1753 CATH, INTRAVAS ULTRASOUND: HCPCS | Performed by: INTERNAL MEDICINE

## 2024-11-07 PROCEDURE — 25010000002 HYDRALAZINE PER 20 MG: Performed by: INTERNAL MEDICINE

## 2024-11-07 PROCEDURE — 63710000001 VITAMIN B-12 100 MCG TABLET: Performed by: INTERNAL MEDICINE

## 2024-11-07 PROCEDURE — 25810000003 SODIUM CHLORIDE 0.9 % SOLUTION: Performed by: INTERNAL MEDICINE

## 2024-11-07 PROCEDURE — 92928 PRQ TCAT PLMT NTRAC ST 1 LES: CPT | Performed by: INTERNAL MEDICINE

## 2024-11-07 PROCEDURE — C9600 PERC DRUG-EL COR STENT SING: HCPCS | Performed by: INTERNAL MEDICINE

## 2024-11-07 PROCEDURE — 63710000001 FOLIC ACID 1 MG TABLET: Performed by: INTERNAL MEDICINE

## 2024-11-07 PROCEDURE — 25510000001 IOPAMIDOL PER 1 ML: Performed by: INTERNAL MEDICINE

## 2024-11-07 PROCEDURE — C1760 CLOSURE DEV, VASC: HCPCS | Performed by: INTERNAL MEDICINE

## 2024-11-07 PROCEDURE — 25010000002 MIDAZOLAM PER 1 MG: Performed by: INTERNAL MEDICINE

## 2024-11-07 PROCEDURE — 92978 ENDOLUMINL IVUS OCT C 1ST: CPT | Performed by: INTERNAL MEDICINE

## 2024-11-07 PROCEDURE — 63710000001 AMLODIPINE 5 MG TABLET: Performed by: INTERNAL MEDICINE

## 2024-11-07 DEVICE — XIENCE SKYPOINT™ EVEROLIMUS ELUTING CORONARY STENT SYSTEM 4.00 MM X 38 MM / RAPID-EXCHANGE
Type: IMPLANTABLE DEVICE | Site: HEART | Status: FUNCTIONAL
Brand: XIENCE SKYPOINT™

## 2024-11-07 RX ORDER — MIDAZOLAM HYDROCHLORIDE 1 MG/ML
INJECTION, SOLUTION INTRAMUSCULAR; INTRAVENOUS
Status: DISCONTINUED | OUTPATIENT
Start: 2024-11-07 | End: 2024-11-07 | Stop reason: HOSPADM

## 2024-11-07 RX ORDER — ASPIRIN 81 MG/1
81 TABLET ORAL DAILY
Status: DISCONTINUED | OUTPATIENT
Start: 2024-11-07 | End: 2024-11-08 | Stop reason: HOSPADM

## 2024-11-07 RX ORDER — HYDRALAZINE HYDROCHLORIDE 20 MG/ML
INJECTION INTRAMUSCULAR; INTRAVENOUS
Status: DISCONTINUED | OUTPATIENT
Start: 2024-11-07 | End: 2024-11-07 | Stop reason: HOSPADM

## 2024-11-07 RX ORDER — AMLODIPINE BESYLATE 5 MG/1
5 TABLET ORAL DAILY
Status: DISCONTINUED | OUTPATIENT
Start: 2024-11-07 | End: 2024-11-08 | Stop reason: HOSPADM

## 2024-11-07 RX ORDER — ONDANSETRON 4 MG/1
4 TABLET, ORALLY DISINTEGRATING ORAL EVERY 6 HOURS PRN
Status: DISCONTINUED | OUTPATIENT
Start: 2024-11-07 | End: 2024-11-08 | Stop reason: HOSPADM

## 2024-11-07 RX ORDER — PANTOPRAZOLE SODIUM 40 MG/1
40 TABLET, DELAYED RELEASE ORAL DAILY
COMMUNITY

## 2024-11-07 RX ORDER — UBIDECARENONE 75 MG
50 CAPSULE ORAL DAILY
Status: DISCONTINUED | OUTPATIENT
Start: 2024-11-07 | End: 2024-11-08 | Stop reason: HOSPADM

## 2024-11-07 RX ORDER — ACETAMINOPHEN 325 MG/1
650 TABLET ORAL EVERY 4 HOURS PRN
Status: DISCONTINUED | OUTPATIENT
Start: 2024-11-07 | End: 2024-11-08 | Stop reason: HOSPADM

## 2024-11-07 RX ORDER — CLOPIDOGREL BISULFATE 75 MG/1
75 TABLET ORAL DAILY
Status: DISCONTINUED | OUTPATIENT
Start: 2024-11-07 | End: 2024-11-08 | Stop reason: HOSPADM

## 2024-11-07 RX ORDER — IOPAMIDOL 755 MG/ML
INJECTION, SOLUTION INTRAVASCULAR
Status: DISCONTINUED | OUTPATIENT
Start: 2024-11-07 | End: 2024-11-07 | Stop reason: HOSPADM

## 2024-11-07 RX ORDER — LANOLIN ALCOHOL/MO/W.PET/CERES
50 CREAM (GRAM) TOPICAL DAILY
Status: DISCONTINUED | OUTPATIENT
Start: 2024-11-07 | End: 2024-11-08 | Stop reason: HOSPADM

## 2024-11-07 RX ORDER — CLOPIDOGREL BISULFATE 75 MG/1
TABLET ORAL
Status: DISCONTINUED | OUTPATIENT
Start: 2024-11-07 | End: 2024-11-07 | Stop reason: HOSPADM

## 2024-11-07 RX ORDER — HEPARIN SODIUM 1000 [USP'U]/ML
INJECTION, SOLUTION INTRAVENOUS; SUBCUTANEOUS
Status: DISCONTINUED | OUTPATIENT
Start: 2024-11-07 | End: 2024-11-07 | Stop reason: HOSPADM

## 2024-11-07 RX ORDER — VALSARTAN 320 MG/1
320 TABLET ORAL DAILY
Status: DISCONTINUED | OUTPATIENT
Start: 2024-11-08 | End: 2024-11-08 | Stop reason: HOSPADM

## 2024-11-07 RX ORDER — ASPIRIN 81 MG/1
TABLET, CHEWABLE ORAL
Status: DISCONTINUED | OUTPATIENT
Start: 2024-11-07 | End: 2024-11-07 | Stop reason: HOSPADM

## 2024-11-07 RX ORDER — LIDOCAINE HYDROCHLORIDE 20 MG/ML
INJECTION, SOLUTION INFILTRATION; PERINEURAL
Status: DISCONTINUED | OUTPATIENT
Start: 2024-11-07 | End: 2024-11-07 | Stop reason: HOSPADM

## 2024-11-07 RX ORDER — ASPIRIN 81 MG/1
81 TABLET, CHEWABLE ORAL DAILY
Status: DISCONTINUED | OUTPATIENT
Start: 2024-11-07 | End: 2024-11-07 | Stop reason: SDUPTHER

## 2024-11-07 RX ORDER — ATORVASTATIN CALCIUM 10 MG/1
10 TABLET, FILM COATED ORAL NIGHTLY
Status: DISCONTINUED | OUTPATIENT
Start: 2024-11-08 | End: 2024-11-07

## 2024-11-07 RX ORDER — ATORVASTATIN CALCIUM 10 MG/1
10 TABLET, FILM COATED ORAL NIGHTLY
Status: DISCONTINUED | OUTPATIENT
Start: 2024-11-07 | End: 2024-11-08 | Stop reason: HOSPADM

## 2024-11-07 RX ORDER — MULTIPLE VITAMINS W/ MINERALS TAB 9MG-400MCG
1 TAB ORAL DAILY
Status: DISCONTINUED | OUTPATIENT
Start: 2024-11-07 | End: 2024-11-08 | Stop reason: HOSPADM

## 2024-11-07 RX ORDER — FENTANYL CITRATE 50 UG/ML
INJECTION, SOLUTION INTRAMUSCULAR; INTRAVENOUS
Status: DISCONTINUED | OUTPATIENT
Start: 2024-11-07 | End: 2024-11-07 | Stop reason: HOSPADM

## 2024-11-07 RX ORDER — HYDROCODONE BITARTRATE AND ACETAMINOPHEN 5; 325 MG/1; MG/1
1 TABLET ORAL EVERY 4 HOURS PRN
Status: DISCONTINUED | OUTPATIENT
Start: 2024-11-07 | End: 2024-11-08 | Stop reason: HOSPADM

## 2024-11-07 RX ORDER — SODIUM CHLORIDE 9 MG/ML
125 INJECTION, SOLUTION INTRAVENOUS CONTINUOUS
Status: ACTIVE | OUTPATIENT
Start: 2024-11-07 | End: 2024-11-08

## 2024-11-07 RX ORDER — VERAPAMIL HYDROCHLORIDE 2.5 MG/ML
INJECTION, SOLUTION INTRAVENOUS
Status: DISCONTINUED | OUTPATIENT
Start: 2024-11-07 | End: 2024-11-07 | Stop reason: HOSPADM

## 2024-11-07 RX ORDER — ONDANSETRON 2 MG/ML
4 INJECTION INTRAMUSCULAR; INTRAVENOUS EVERY 6 HOURS PRN
Status: DISCONTINUED | OUTPATIENT
Start: 2024-11-07 | End: 2024-11-08 | Stop reason: HOSPADM

## 2024-11-07 RX ORDER — NITROGLYCERIN 0.4 MG/1
0.4 TABLET SUBLINGUAL
Status: DISCONTINUED | OUTPATIENT
Start: 2024-11-07 | End: 2024-11-07 | Stop reason: SDUPTHER

## 2024-11-07 RX ORDER — SODIUM CHLORIDE 9 MG/ML
75 INJECTION, SOLUTION INTRAVENOUS CONTINUOUS
Status: ACTIVE | OUTPATIENT
Start: 2024-11-07 | End: 2024-11-07

## 2024-11-07 RX ORDER — FOLIC ACID 1 MG/1
1 TABLET ORAL DAILY
Status: DISCONTINUED | OUTPATIENT
Start: 2024-11-07 | End: 2024-11-08 | Stop reason: HOSPADM

## 2024-11-07 RX ORDER — SODIUM CHLORIDE 0.9 % (FLUSH) 0.9 %
10 SYRINGE (ML) INJECTION EVERY 12 HOURS SCHEDULED
Status: DISCONTINUED | OUTPATIENT
Start: 2024-11-07 | End: 2024-11-07 | Stop reason: HOSPADM

## 2024-11-07 RX ORDER — NITROGLYCERIN 0.4 MG/1
0.4 TABLET SUBLINGUAL
Status: DISCONTINUED | OUTPATIENT
Start: 2024-11-07 | End: 2024-11-08 | Stop reason: HOSPADM

## 2024-11-07 RX ADMIN — ASPIRIN 81 MG: 81 TABLET, COATED ORAL at 21:25

## 2024-11-07 RX ADMIN — Medication 50 MG: at 17:32

## 2024-11-07 RX ADMIN — ATORVASTATIN CALCIUM 10 MG: 10 TABLET, FILM COATED ORAL at 22:20

## 2024-11-07 RX ADMIN — AMLODIPINE BESYLATE 5 MG: 5 TABLET ORAL at 21:26

## 2024-11-07 RX ADMIN — SODIUM CHLORIDE 75 ML/HR: 9 INJECTION, SOLUTION INTRAVENOUS at 10:38

## 2024-11-07 RX ADMIN — CLOPIDOGREL BISULFATE 75 MG: 75 TABLET, FILM COATED ORAL at 21:25

## 2024-11-07 RX ADMIN — FOLIC ACID 1 MG: 1 TABLET ORAL at 17:32

## 2024-11-07 RX ADMIN — Medication 50 MCG: at 17:32

## 2024-11-07 RX ADMIN — SODIUM CHLORIDE 125 ML/HR: 9 INJECTION, SOLUTION INTRAVENOUS at 22:20

## 2024-11-07 RX ADMIN — SODIUM CHLORIDE 125 ML/HR: 9 INJECTION, SOLUTION INTRAVENOUS at 15:14

## 2024-11-07 NOTE — Clinical Note
The DP pulses are +2 bilaterally. The PT pulses are +2 bilaterally. The right radial pulse is +2. The right ulnar pulse is +1.

## 2024-11-07 NOTE — Clinical Note
First balloon inflation max pressure = 20 turner. First balloon inflation duration = 10 seconds. Second inflation of balloon - Max pressure = 20 turner. 2nd Inflation of balloon - Duration = 8 seconds. 2nd inflation was done at 13:20 EST.

## 2024-11-07 NOTE — Clinical Note
First balloon inflation max pressure = 20 turner. First balloon inflation duration = 10 seconds. Second inflation of balloon - Max pressure = 20 turner. 2nd Inflation of balloon - Duration = 10 seconds. 2nd inflation was done at 13:41 EST.

## 2024-11-07 NOTE — Clinical Note
First balloon inflation max pressure = 10 turner. First balloon inflation duration = 5 seconds. Second inflation of balloon - Max pressure = 14 turner. 2nd Inflation of balloon - Duration = 5 seconds. 2nd inflation was done at 13:30 EST.

## 2024-11-07 NOTE — Clinical Note
Hemostasis started on the right radial artery. R-Band was used in achieving hemostasis. Radial compression device applied to vessel. Hemostasis achieved successfully. Closure device additional comment: Tr 12 cc

## 2024-11-07 NOTE — Clinical Note
First balloon inflation max pressure = 10 turner. First balloon inflation duration = 10 seconds. Second inflation of balloon - Max pressure = 10 turner. 2nd Inflation of balloon - Duration = 10 seconds. 2nd inflation was done at 13:44 EST. Third inflation of balloon - Max pressure = 20 turner. 3rd Inflation of balloon - Duration = 10 seconds. 3rd inflation was done at 13:45 EST.

## 2024-11-07 NOTE — Clinical Note
Mirima GARCIA switched out Britt RN to hold pressure on right fem site. Over 30 mins of pressure held at this time.

## 2024-11-07 NOTE — INTERVAL H&P NOTE
Continues to have concerning symptoms and has been sent directly for cath.  H&P reviewed. The patient was examined and there are no changes to the H&P. I have explained the risks and benefits of the procedure to the patient.  The patient understands and agrees to proceed

## 2024-11-08 VITALS
OXYGEN SATURATION: 97 % | DIASTOLIC BLOOD PRESSURE: 70 MMHG | SYSTOLIC BLOOD PRESSURE: 137 MMHG | HEART RATE: 63 BPM | WEIGHT: 207 LBS | RESPIRATION RATE: 16 BRPM | BODY MASS INDEX: 30.66 KG/M2 | TEMPERATURE: 97.7 F | HEIGHT: 69 IN

## 2024-11-08 LAB
ACT BLD: 268 SECONDS (ref 82–152)
ACT BLD: 343 SECONDS (ref 82–152)
ANION GAP SERPL CALCULATED.3IONS-SCNC: 8.5 MMOL/L (ref 5–15)
BUN SERPL-MCNC: 14 MG/DL (ref 8–23)
BUN/CREAT SERPL: 16.9 (ref 7–25)
CALCIUM SPEC-SCNC: 8.6 MG/DL (ref 8.6–10.5)
CHLORIDE SERPL-SCNC: 104 MMOL/L (ref 98–107)
CHOLEST SERPL-MCNC: 120 MG/DL (ref 0–200)
CO2 SERPL-SCNC: 23.5 MMOL/L (ref 22–29)
CREAT SERPL-MCNC: 0.83 MG/DL (ref 0.76–1.27)
DEPRECATED RDW RBC AUTO: 41.3 FL (ref 37–54)
EGFRCR SERPLBLD CKD-EPI 2021: 88.5 ML/MIN/1.73
ERYTHROCYTE [DISTWIDTH] IN BLOOD BY AUTOMATED COUNT: 12.4 % (ref 12.3–15.4)
GLUCOSE SERPL-MCNC: 126 MG/DL (ref 65–99)
HBA1C MFR BLD: 5.8 % (ref 4.8–5.6)
HCT VFR BLD AUTO: 38.4 % (ref 37.5–51)
HDLC SERPL-MCNC: 41 MG/DL (ref 40–60)
HGB BLD-MCNC: 12.8 G/DL (ref 13–17.7)
LDLC SERPL CALC-MCNC: 63 MG/DL (ref 0–100)
LDLC/HDLC SERPL: 1.55 {RATIO}
MCH RBC QN AUTO: 30.6 PG (ref 26.6–33)
MCHC RBC AUTO-ENTMCNC: 33.3 G/DL (ref 31.5–35.7)
MCV RBC AUTO: 91.9 FL (ref 79–97)
PLATELET # BLD AUTO: 246 10*3/MM3 (ref 140–450)
PMV BLD AUTO: 10.1 FL (ref 6–12)
POTASSIUM SERPL-SCNC: 4 MMOL/L (ref 3.5–5.2)
QT INTERVAL: 556 MS
QTC INTERVAL: 565 MS
RBC # BLD AUTO: 4.18 10*6/MM3 (ref 4.14–5.8)
SODIUM SERPL-SCNC: 136 MMOL/L (ref 136–145)
TRIGL SERPL-MCNC: 78 MG/DL (ref 0–150)
VLDLC SERPL-MCNC: 16 MG/DL (ref 5–40)
WBC NRBC COR # BLD AUTO: 17.54 10*3/MM3 (ref 3.4–10.8)

## 2024-11-08 PROCEDURE — A9270 NON-COVERED ITEM OR SERVICE: HCPCS | Performed by: INTERNAL MEDICINE

## 2024-11-08 PROCEDURE — 63710000001 VALSARTAN 320 MG TABLET: Performed by: INTERNAL MEDICINE

## 2024-11-08 PROCEDURE — 63710000001 AMLODIPINE 5 MG TABLET: Performed by: INTERNAL MEDICINE

## 2024-11-08 PROCEDURE — 63710000001 VITAMIN B-12 100 MCG TABLET: Performed by: INTERNAL MEDICINE

## 2024-11-08 PROCEDURE — 93010 ELECTROCARDIOGRAM REPORT: CPT | Performed by: INTERNAL MEDICINE

## 2024-11-08 PROCEDURE — 80061 LIPID PANEL: CPT | Performed by: INTERNAL MEDICINE

## 2024-11-08 PROCEDURE — 83036 HEMOGLOBIN GLYCOSYLATED A1C: CPT | Performed by: INTERNAL MEDICINE

## 2024-11-08 PROCEDURE — 85027 COMPLETE CBC AUTOMATED: CPT | Performed by: INTERNAL MEDICINE

## 2024-11-08 PROCEDURE — 99238 HOSP IP/OBS DSCHRG MGMT 30/<: CPT | Performed by: INTERNAL MEDICINE

## 2024-11-08 PROCEDURE — 80048 BASIC METABOLIC PNL TOTAL CA: CPT | Performed by: INTERNAL MEDICINE

## 2024-11-08 PROCEDURE — 63710000001 CLOPIDOGREL 75 MG TABLET: Performed by: INTERNAL MEDICINE

## 2024-11-08 PROCEDURE — 63710000001 ASPIRIN 81 MG TABLET DELAYED-RELEASE: Performed by: INTERNAL MEDICINE

## 2024-11-08 PROCEDURE — 93005 ELECTROCARDIOGRAM TRACING: CPT | Performed by: INTERNAL MEDICINE

## 2024-11-08 PROCEDURE — G0378 HOSPITAL OBSERVATION PER HR: HCPCS

## 2024-11-08 PROCEDURE — 63710000001 MULTIVITAMIN WITH MINERALS TABLET: Performed by: INTERNAL MEDICINE

## 2024-11-08 PROCEDURE — 63710000001 FOLIC ACID 1 MG TABLET: Performed by: INTERNAL MEDICINE

## 2024-11-08 PROCEDURE — 63710000001 VITAMIN B-6 50 MG TABLET: Performed by: INTERNAL MEDICINE

## 2024-11-08 RX ORDER — ATORVASTATIN CALCIUM 20 MG/1
20 TABLET, FILM COATED ORAL NIGHTLY
Qty: 90 TABLET | Refills: 3 | Status: SHIPPED | OUTPATIENT
Start: 2024-11-08

## 2024-11-08 RX ADMIN — Medication 50 MG: at 09:07

## 2024-11-08 RX ADMIN — AMLODIPINE BESYLATE 5 MG: 5 TABLET ORAL at 09:08

## 2024-11-08 RX ADMIN — Medication 50 MCG: at 09:07

## 2024-11-08 RX ADMIN — ASPIRIN 81 MG: 81 TABLET, COATED ORAL at 09:07

## 2024-11-08 RX ADMIN — VALSARTAN 320 MG: 320 TABLET, FILM COATED ORAL at 09:08

## 2024-11-08 RX ADMIN — Medication 1 TABLET: at 09:07

## 2024-11-08 RX ADMIN — FOLIC ACID 1 MG: 1 TABLET ORAL at 09:07

## 2024-11-08 RX ADMIN — CLOPIDOGREL BISULFATE 75 MG: 75 TABLET, FILM COATED ORAL at 09:07

## 2024-11-08 NOTE — PLAN OF CARE
Goal Outcome Evaluation:  Plan of Care Reviewed With: patient        Progress: improving  Outcome Evaluation: VSS; A&Ox4. Pt s/p PCI to RCA. Pt with a right radial site that remains c/d/s and a right femoral site that c/d/s. Pt w/o c/o of pain during the night. Pt SBP 140s-150s. Pt room air; sat 92-97. Pt is SR on the monitor; rates in the 80s-60s. Pt expresses no additional needs at this time. Plan of care is ongoing.

## 2024-11-08 NOTE — PROGRESS NOTES
Jed Correia  [id number]    Date of Admit: 11/7/2024  Date of Discharge:  11/8/2024    Discharge Diagnosis:  Active Hospital Problems    Diagnosis  POA    **Unstable angina [I20.0]  Yes      Resolved Hospital Problems   No resolved problems to display.       Hospital Course: NSAID he has done very well came in with the kind of accelerating angina cath which found he had critical disease in his mid RCA and a significant restenotic lesion in his proximal RCA he underwent successful angioplasty and drug-eluting stenting of the mid RCA and balloon alone to the proximal RCA.  This was optimized with intravascular ultrasound.  We did have to going through his legs so he ended up spending the night I am going to increase his atorvastatin from 10-20 but no other big changes I think just stay on the aspirin for 1 month and then just go to long-term clopidogrel    Procedures Performed  Procedure(s):  Left Heart Cath  Left ventriculography  Percutaneous Coronary Intervention  Intravascular Ultrasound  Stent PETER coronary       Consults       No orders found for last 30 day(s).            Discharge Medications     Your medication list        CHANGE how you take these medications        Instructions Last Dose Given Next Dose Due   atorvastatin 20 MG tablet  Commonly known as: LIPITOR  What changed:   medication strength  how much to take      Take 1 tablet by mouth Every Night.       pantoprazole 40 MG EC tablet  Commonly known as: PROTONIX  What changed: Another medication with the same name was removed. Continue taking this medication, and follow the directions you see here.      Take 1 tablet by mouth Daily.              CONTINUE taking these medications        Instructions Last Dose Given Next Dose Due   amLODIPine 5 MG tablet  Commonly known as: NORVASC      Take 1 tablet by mouth Daily.       aspirin 81 MG chewable tablet      Chew 1 tablet Daily.       clopidogrel 75 MG tablet  Commonly known as: PLAVIX      Take 1 tablet  by mouth Daily.       Co Q 10 10 MG capsule      Take 1 tablet by mouth Daily.       desonide 0.05 % lotion  Commonly known as: DESOWEN      Apply 1 application topically to the appropriate area as directed 2 (Two) Times a Day.       ezetimibe 10 MG tablet  Commonly known as: ZETIA      Take 1 tablet by mouth Daily.       fish oil 1000 MG capsule capsule      Take 1 capsule by mouth Daily With Breakfast.       Fluzone High-Dose Quadrivalent 0.7 ML suspension prefilled syringe injection  Generic drug: Influenza Vac High-Dose Quad      Inject  into the appropriate muscle as directed by prescriber.       folic acid 1 MG tablet  Commonly known as: FOLVITE      Take 1 tablet by mouth Daily.       glucosamine-chondroitin 500-400 MG capsule capsule      Take 2 capsules by mouth Daily.       metoprolol succinate XL 25 MG 24 hr tablet  Commonly known as: TOPROL-XL      TAKE 1/2 TABLET BY MOUTH DAILY. HOLD FOR HEART RATE LESS THEN 60 BPM.       multivitamin with minerals tablet tablet      Take 1 tablet by mouth Daily.       nitroglycerin 0.4 MG SL tablet  Commonly known as: NITROSTAT      Place 1 tablet under the tongue Every 5 (Five) Minutes As Needed for Chest Pain.       PROBIOTIC DAILY PO      Take 1 capsule by mouth Daily.       valsartan 320 MG tablet  Commonly known as: DIOVAN      Take 1 tablet by mouth Daily.       venlafaxine 75 MG tablet  Commonly known as: EFFEXOR      Daily.       vitamin B-12 100 MCG tablet  Commonly known as: CYANOCOBALAMIN      Take 0.5 tablets by mouth Daily.       vitamin B-6 50 MG tablet  Commonly known as: PYRIDOXINE      Take 1 tablet by mouth Daily.       Vitamin D-3 25 MCG (1000 UT) capsule      Take 2,000 Units by mouth Daily.                 Where to Get Your Medications        These medications were sent to [facility] [place] [id number]      Hours: Monday to Friday 7 AM to 6 PM, Saturday & Sunday 8 AM to 4:30 PM (Closed 12 PM to 12:30 PM)  Phone: [phone]   atorvastatin 20 MG tablet         Discharge Diet:     Activity at Discharge:     Discharge disposition: home    Condition on Discharge: stable    Follow-up Appointments  Future Appointments   Date Time Provider Department Center   11/18/2024  1:00 PM  [facility]  [facility]   2/4/2025 10:00 AM [facility] RM 1  [facility]   2/4/2025 10:30 AM James Karimi MD [facility]   11/5/2025 10:00 AM Joleen Gregorio APRN [facility]     [unfilled]    Test Results Pending at Discharge       Adan Payne MD  11/08/24  10:01 EST

## 2024-11-11 NOTE — CASE MANAGEMENT/SOCIAL WORK
Case Management Discharge Note      Final Note: Home.         Selected Continued Care - Discharged on 11/8/2024 Admission date: 11/7/2024 - Discharge disposition: Home or Self Care      Destination    No services have been selected for the patient.                Durable Medical Equipment    No services have been selected for the patient.                Dialysis/Infusion    No services have been selected for the patient.                Home Medical Care    No services have been selected for the patient.                Therapy    No services have been selected for the patient.                Community Resources    No services have been selected for the patient.                Community & DME    No services have been selected for the patient.                         Final Discharge Disposition Code: 01 - home or self-care

## 2024-11-12 RX ORDER — EZETIMIBE 10 MG/1
10 TABLET ORAL DAILY
Qty: 90 TABLET | Refills: 3 | Status: SHIPPED | OUTPATIENT
Start: 2024-11-12

## 2024-11-12 RX ORDER — ATORVASTATIN CALCIUM 10 MG/1
10 TABLET, FILM COATED ORAL NIGHTLY
Qty: 90 TABLET | Refills: 3 | Status: SHIPPED | OUTPATIENT
Start: 2024-11-12 | End: 2024-11-13

## 2024-11-12 RX ORDER — CLOPIDOGREL BISULFATE 75 MG/1
75 TABLET ORAL DAILY
Qty: 90 TABLET | Refills: 3 | Status: SHIPPED | OUTPATIENT
Start: 2024-11-12

## 2024-11-13 ENCOUNTER — OFFICE VISIT (OUTPATIENT)
Dept: CARDIOLOGY | Facility: CLINIC | Age: 79
End: 2024-11-13
Payer: MEDICARE

## 2024-11-13 VITALS
SYSTOLIC BLOOD PRESSURE: 130 MMHG | HEIGHT: 69 IN | BODY MASS INDEX: 31.55 KG/M2 | WEIGHT: 213 LBS | HEART RATE: 69 BPM | DIASTOLIC BLOOD PRESSURE: 76 MMHG

## 2024-11-13 DIAGNOSIS — I65.21 CAROTID STENOSIS, ASYMPTOMATIC, RIGHT: ICD-10-CM

## 2024-11-13 DIAGNOSIS — I10 PRIMARY HYPERTENSION: ICD-10-CM

## 2024-11-13 DIAGNOSIS — I35.8 AORTIC VALVE SCLEROSIS: Primary | ICD-10-CM

## 2024-11-13 DIAGNOSIS — Z95.5 S/P DRUG ELUTING CORONARY STENT PLACEMENT: ICD-10-CM

## 2024-11-13 DIAGNOSIS — I25.10 CORONARY ARTERY DISEASE INVOLVING NATIVE CORONARY ARTERY OF NATIVE HEART WITHOUT ANGINA PECTORIS: ICD-10-CM

## 2024-11-13 PROCEDURE — 93000 ELECTROCARDIOGRAM COMPLETE: CPT | Performed by: NURSE PRACTITIONER

## 2024-11-13 PROCEDURE — 1159F MED LIST DOCD IN RCRD: CPT | Performed by: NURSE PRACTITIONER

## 2024-11-13 PROCEDURE — 1160F RVW MEDS BY RX/DR IN RCRD: CPT | Performed by: NURSE PRACTITIONER

## 2024-11-13 PROCEDURE — 99214 OFFICE O/P EST MOD 30 MIN: CPT | Performed by: NURSE PRACTITIONER

## 2024-11-13 NOTE — PROGRESS NOTES
Date of Office Visit: 24  Encounter Provider: KARIS Erickosn  Place of Service: [facility] CARDIOLOGY  Patient Name: Jed Correia  :1945    Chief Complaint   Patient presents with    Coronary artery disease involving native coronary artery of    Primary hypertension    Follow-up   :     HPI: Jed Correia is a 79 y.o. male  with hypertension, dyslipidemia, premature atrial contraction, coronary artery disease, carotid artery stenoses, aortic valve sclerosis and aortic valve insufficiency, GERD, obstructive sleep apnea, prostate cancer and gastric ulcer.  He had prior colon cancer resection .        He is a former smoker who quit in .  He has family history involving a brother who had myocardial infarction followed by CABG.        He is followed by Dr. Jed Moody. I will visit with him in follow up and have reviewed his medical record.      He was previously followed by [facility] and had drug-eluting stent placement 2023 to the proximal and mid RCA with overlapping Xience stents.  he had a Holter monitor in 2022 which showed 15% premature atrial contraction but no sustained arrhythmia.     He had mild to moderate carotid artery stenosis on last duplex 10/2022.  He then had cardiac murmur appreciated 2023 and had echocardiogram 2023 which showed normal left ventricular systolic function, mild concentric hypertrophy, grade 1 diastolic dysfunction, moderately calcified aortic valve with mild aortic valve regurgitation and normal RVSP.     We did have some elevated blood pressure and was started on losartan.  Follow-up blood work 2024 showed stable renal function.        On 10/30/2024 he reported unusual exertional dyspnea.  He had woke up at night with shortness of breath and heart racing and then had increased fatigue and higher blood pressure readings with dyspnea on exertion.  Perfusion stress test was ordered and  "scheduled for 11/14/2024 but then on 11/5/2024 patient called with increased dyspnea on exertion felt to be concerning for anginal equivalent.  He was set up for heart catheterization on 11/7/2024 and loaded with Plavix, heparin and full-strength aspirin.  He was found to have 90% mid RCA lesion which was stented and a proximal RCA lesion of 70% which responded to balloon angioplasty and was decreased to 20%.  He was referred to cardiac rehab.      He presents today for reassessment and has a lot of bruising in his groin.  No chest pain and his breathing already feels better.  He is scheduled for pulmonary function test tomorrow and has a home sleep study scheduled on the 18th.  He denies edema or dizziness or palpitation.  He is a agreeable to cardiac rehab.  No blood in the urine or stool at this time.      Allergies   Allergen Reactions    Bacitracin-Polymyxin B Unknown - Low Severity    Iodine Rash    Latex Rash    Neomycin-Bacitracin Zn-Polymyx Unknown - Low Severity    Povidone-Iodine Rash           Family and social history reviewed.     ROS  All other systems were reviewed and are negative          Objective:     Vitals:    11/13/24 1452   BP: 130/76   BP Location: Left arm   Patient Position: Sitting   Cuff Size: Adult   Pulse: 69   Weight: 96.6 kg (213 lb)   Height: 175.3 cm (69\")     Body mass index is 31.45 kg/m².    PHYSICAL EXAM:  Cardiovascular:      Normal rate. Regular rhythm.      Murmurs: There is a grade 1/6 systolic murmur.      Comments: Right groin ecchymotic but no bruit, small hematoma, distal pulse intact          ECG 12 Lead    Date/Time: 11/13/2024 3:29 PM  Performed by: Joleen Gregorio APRN    Authorized by: Joleen Gregorio APRN  Comparison: compared with previous ECG   Similar to previous ECG  Rhythm: sinus arrhythmia  Rate: normal  Q waves: III and aVF      Clinical impression: abnormal EKG            Current Outpatient Medications   Medication Sig Dispense Refill    amLODIPine (NORVASC) " 5 MG tablet Take 1 tablet by mouth Daily. 30 tablet 11    aspirin 81 MG chewable tablet Chew 1 tablet Daily. 90 tablet 3    atorvastatin (LIPITOR) 20 MG tablet Take 1 tablet by mouth Every Night. 90 tablet 3    Cholecalciferol (VITAMIN D-3) 1000 UNITS capsule Take 2,000 Units by mouth Daily.      clopidogrel (PLAVIX) 75 MG tablet TAKE 1 TABLET BY MOUTH DAILY 90 tablet 3    Coenzyme Q10 (Co Q 10) 10 MG capsule Take 1 tablet by mouth Daily. 90 each 3    desonide (DESOWEN) 0.05 % lotion Apply 1 application topically to the appropriate area as directed 2 (Two) Times a Day.      ezetimibe (ZETIA) 10 MG tablet TAKE 1 TABLET BY MOUTH DAILY 90 tablet 3    folic acid (FOLVITE) 1 MG tablet Take 1 tablet by mouth Daily.      glucosamine-chondroitin 500-400 MG capsule capsule Take 2 capsules by mouth Daily.      Influenza Vac High-Dose Quad (Fluzone High-Dose Quadrivalent) 0.7 ML suspension prefilled syringe injection Inject  into the appropriate muscle as directed by prescriber. 0.7 mL 0    metoprolol succinate XL (TOPROL-XL) 25 MG 24 hr tablet TAKE 1/2 TABLET BY MOUTH DAILY. HOLD FOR HEART RATE LESS THEN 60 BPM. 90 tablet 1    Multiple Vitamins-Minerals (CENTRUM SILVER ADULT 50+ PO) Take 1 tablet by mouth Daily.      nitroglycerin (NITROSTAT) 0.4 MG SL tablet Place 1 tablet under the tongue Every 5 (Five) Minutes As Needed for Chest Pain. 15 tablet 11    Omega-3 Fatty Acids (FISH OIL) 1000 MG capsule capsule Take 1 capsule by mouth Daily With Breakfast.      pantoprazole (PROTONIX) 40 MG EC tablet Take 1 tablet by mouth Daily.      Probiotic Product (PROBIOTIC DAILY PO) Take 1 capsule by mouth Daily.      valsartan (DIOVAN) 320 MG tablet Take 1 tablet by mouth Daily.      venlafaxine (EFFEXOR) 75 MG tablet Daily.      vitamin B-12 (CYANOCOBALAMIN) 100 MCG tablet Take 0.5 tablets by mouth Daily.      vitamin B-6 (PYRIDOXINE) 50 MG tablet Take 1 tablet by mouth Daily.       No current facility-administered medications for this  visit.     Assessment:       Diagnosis Plan   1. Aortic valve sclerosis        2. Coronary artery disease involving native coronary artery of native heart without angina pectoris        3. Primary hypertension        4. Carotid stenosis, asymptomatic, right        5. S/P drug eluting coronary stent placement             Orders Placed This Encounter   Procedures    ECG 12 Lead     This order was created via procedure documentation     Order Specific Question:   Release to patient     Answer:   Routine Release [[id number]]         Plan:         1.  78-year-old gentleman with coronary artery disease and prior PCI of the right coronary artery in 02/2013.  He had unstable angina with dyspnea on exertion is his anginal equivalent and is now status post drug-eluting stent to the mid RCA and had balloon angioplasty of the proximal RCA 11/7/2024.  He will continue dual antiplatelet therapy.  I sent a message to cardiac rehab to help coordinate initial assessment.  He will follow-up in 6 to 8 weeks and call sooner with any issues  2.  Hypertension-blood pressure appears well-controlled on current regimen.    3.  Dyslipidemia.  Target LDL 70 or less.  He is maintained on atorvastatin 10 mg, ezetimibe higher dose atorvastatin at 20 mg since repeat stent placement November 8, 2024.  He will continue ezetimibe.  Target LDL less than 70 and his last LDL was 63.  4.  Carotid artery stenoses with moderate stenoses in the right and mild stenoses on the left on last duplex 10/2022-he is scheduled to see vascular surgery in February with repeat imaging  5.  Premature atrial contraction, frequent at 15% on prior Holter monitor in 03/2022 @ [facility]  6.  Cardiac murmur with moderately calcified aortic valve and mild aortic valve regurgitation on echo 11/2023  7.  Former smoker who quit in 1978  8.  GERD  9.  History of gastric ulcer  10.  Colon cancer status post resection 1986  11.  Prostate cancer that is post prostatectomy  01/2011  12.  Dyspnea on exertion-he is also scheduled for pulmonary pulmonary function test tomorrow  13.  Obstructive sleep apnea-he has oral appliance but is to have a home sleep study repeated on 11/18/2024  14.ormer smoker who quit in 1978.        It has been a pleasure to participate in this patient's care.      Thank you,  KARIS Erickson      **I used Dragon to dictate this note:**

## 2024-11-18 ENCOUNTER — TRANSCRIBE ORDERS (OUTPATIENT)
Dept: CARDIAC REHAB | Facility: HOSPITAL | Age: 79
End: 2024-11-18
Payer: MEDICARE

## 2024-11-18 DIAGNOSIS — Z95.5 STATUS POST INSERTION OF DRUG ELUTING CORONARY ARTERY STENT: Primary | ICD-10-CM

## 2024-11-19 ENCOUNTER — TELEPHONE (OUTPATIENT)
Dept: SLEEP MEDICINE | Facility: HOSPITAL | Age: 79
End: 2024-11-19
Payer: MEDICARE

## 2024-11-19 ENCOUNTER — HOSPITAL ENCOUNTER (OUTPATIENT)
Dept: SLEEP MEDICINE | Facility: HOSPITAL | Age: 79
Discharge: HOME | End: 2024-11-19
Admitting: INTERNAL MEDICINE
Payer: MEDICARE

## 2024-11-19 PROCEDURE — G0399 HOME SLEEP TEST/TYPE 3 PORTA: HCPCS

## 2024-11-25 ENCOUNTER — OFFICE VISIT (OUTPATIENT)
Dept: CARDIAC REHAB | Facility: HOSPITAL | Age: 79
End: 2024-11-25
Payer: MEDICARE

## 2024-11-25 DIAGNOSIS — Z95.5 S/P DRUG ELUTING CORONARY STENT PLACEMENT: Primary | ICD-10-CM

## 2024-11-25 PROCEDURE — 93798 PHYS/QHP OP CAR RHAB W/ECG: CPT

## 2024-11-26 ENCOUNTER — OFFICE VISIT (OUTPATIENT)
Dept: SLEEP MEDICINE | Facility: HOSPITAL | Age: 79
End: 2024-11-26
Payer: MEDICARE

## 2024-11-26 VITALS — HEIGHT: 69 IN | WEIGHT: 216 LBS | OXYGEN SATURATION: 96 % | HEART RATE: 58 BPM | BODY MASS INDEX: 31.99 KG/M2

## 2024-11-26 DIAGNOSIS — G47.39 POSITIONAL SLEEP APNEA: Primary | ICD-10-CM

## 2024-11-26 PROCEDURE — G0463 HOSPITAL OUTPT CLINIC VISIT: HCPCS

## 2024-11-26 PROCEDURE — 1159F MED LIST DOCD IN RCRD: CPT | Performed by: INTERNAL MEDICINE

## 2024-11-26 PROCEDURE — 1160F RVW MEDS BY RX/DR IN RCRD: CPT | Performed by: INTERNAL MEDICINE

## 2024-11-26 PROCEDURE — 99213 OFFICE O/P EST LOW 20 MIN: CPT | Performed by: INTERNAL MEDICINE

## 2024-11-26 NOTE — PROGRESS NOTES
Follow Up Sleep Disorders Center Note     Chief Complaint:  SHARA     Primary Care Physician: Myke Landaverde MD    Interval History:   The patient is a 79 y.o. male who I last saw 2019 and that note was reviewed.  The patient was reevaluated in the Sleep Disorder Center 2024.  By home sleep study 2019, weight 207 pounds, the patient has mild positional, supine, obstructive sleep apnea with mandibular advancement device in place.  Due to high blood pressure and complaints of awakening with shallow breathing, reevaluation recommended.    Home sleep study performed 2024 with weight 216 pounds.  AHI normal for total monitoring time.  When supine for 174.6 minutes, AHI near normal/mildly abnormal 6.2 events per hour.  5 events per hour or less normal.  Low oxygen saturation 90% no sleep-related hypoxia identified.    The patient goes to bed 11:30 PM gets out of bed at 8 AM.  He will use the restroom during that time.    The patient reported having PFTs 2020.  FEV1 2.31 L, 83% predicted.  Significant change reported following bronchodilators.  I reviewed the interpretation and I could not see wrong numbers.  The patient will follow-up with Dr. Myke Landaverde.    Review of Systems:    A complete review of systems was done and all were negative with the exception of the above    Social History:    Social History     Socioeconomic History    Marital status:    Tobacco Use    Smoking status: Former     Current packs/day: 0.00     Average packs/day: 0.5 packs/day for 17.3 years (8.7 ttl pk-yrs)     Types: Cigarettes, Pipe     Start date: 1960     Quit date: 1978     Years since quittin.9     Passive exposure: Past (family smoked in the  home)    Smokeless tobacco: Never    Tobacco comments:     Light smoker during this period   Vaping Use    Vaping status: Never Used   Substance and Sexual Activity    Alcohol use: Yes     Alcohol/week: 2.0 standard drinks of alcohol      "Types: 2 Cans of beer per week     Comment: 2 drinks at social [id number]    Drug use: No    Sexual activity: Not Currently     Partners: Female     Comment: Prostate removal nerve damage.       Allergies:  Bacitracin-polymyxin b, Iodine, Latex, Neomycin-bacitracin zn-polymyx, and Povidone-iodine     Medication Review:  Reviewed.      Vital Signs:    Vitals:    11/26/24 0754   Pulse: 58   SpO2: 96%   Weight: 98 kg (216 lb)   Height: 175.3 cm (69\")     Body mass index is 31.9 kg/m².    Physical Exam:    Constitutional:  Well developed 79 y.o. male that appears in no apparent distress.  Awake & oriented times 3.  Normal mood with normal recent and remote memory and normal judgement.  Eyes:  Conjunctivae normal.  Oropharynx: Previously, moist mucous membranes without exudate and a large tongue and normal uvula and mild-moderate narrowing of the posterior pharyngeal opening and class III Mallampati airway.    Self-administered [place] Sleepiness Scale test results: 6  0-5 Lower normal daytime sleepiness  6-10 Higher normal daytime sleepiness  11-12 Mild, 13-15 Moderate, & 16-24 Severe excessive daytime sleepiness     Impression:   History moderate severity positional obstructive sleep apnea nearly adequately treated with a mandibular advancement device with home sleep studies performed 9/18/2019 and 11/19/2024 demonstrating borderline abnormal mild positional, supine, obstructive sleep apnea with AHI 6.4 and 6.2 events per hour respectively.    Plan:  Good sleep hygiene measures should be maintained.  Weight loss would be beneficial in this patient who is obese by Body mass index is 31.9 kg/m².  When last seen by me 11/16/2019 his weight was 209 pounds and presently he weighs 216 pounds.    After evaluating the patient and assessing results available, the patient is benefiting from the treatment being provided.     The patient will continue with his mandibular advancement device.  I discussed with the patient that he " could follow-up with dental sleep medicine and if possible, protrude his jaw 1 more millimeter to further improve treatment of his obstructive sleep apnea.      I answered all of the patient's questions.  The patient will call the Sleep Disorder Center for any problems and will follow up as needed.      Nayan Finney MD  Sleep Medicine  11/26/24  07:56 EST

## 2024-12-02 ENCOUNTER — TREATMENT (OUTPATIENT)
Dept: CARDIAC REHAB | Facility: HOSPITAL | Age: 79
End: 2024-12-02
Payer: MEDICARE

## 2024-12-02 DIAGNOSIS — Z95.5 S/P DRUG ELUTING CORONARY STENT PLACEMENT: Primary | ICD-10-CM

## 2024-12-02 PROCEDURE — 93798 PHYS/QHP OP CAR RHAB W/ECG: CPT

## 2024-12-18 ENCOUNTER — TREATMENT (OUTPATIENT)
Dept: CARDIAC REHAB | Facility: HOSPITAL | Age: 79
End: 2024-12-18
Payer: MEDICARE

## 2024-12-18 DIAGNOSIS — Z95.5 S/P DRUG ELUTING CORONARY STENT PLACEMENT: Primary | ICD-10-CM

## 2024-12-18 PROCEDURE — 93798 PHYS/QHP OP CAR RHAB W/ECG: CPT

## 2024-12-20 ENCOUNTER — TREATMENT (OUTPATIENT)
Dept: CARDIAC REHAB | Facility: HOSPITAL | Age: 79
End: 2024-12-20
Payer: MEDICARE

## 2024-12-20 DIAGNOSIS — Z95.5 S/P DRUG ELUTING CORONARY STENT PLACEMENT: Primary | ICD-10-CM

## 2024-12-20 PROCEDURE — 93798 PHYS/QHP OP CAR RHAB W/ECG: CPT

## 2024-12-23 ENCOUNTER — OFFICE VISIT (OUTPATIENT)
Dept: CARDIOLOGY | Facility: CLINIC | Age: 79
End: 2024-12-23
Payer: MEDICARE

## 2024-12-23 ENCOUNTER — TREATMENT (OUTPATIENT)
Dept: CARDIAC REHAB | Facility: HOSPITAL | Age: 79
End: 2024-12-23
Payer: MEDICARE

## 2024-12-23 VITALS
OXYGEN SATURATION: 96 % | SYSTOLIC BLOOD PRESSURE: 132 MMHG | DIASTOLIC BLOOD PRESSURE: 76 MMHG | BODY MASS INDEX: 30.96 KG/M2 | WEIGHT: 209 LBS | HEART RATE: 72 BPM | HEIGHT: 69 IN

## 2024-12-23 DIAGNOSIS — Z95.5 S/P DRUG ELUTING CORONARY STENT PLACEMENT: Primary | ICD-10-CM

## 2024-12-23 DIAGNOSIS — I25.10 CORONARY ARTERY DISEASE INVOLVING NATIVE CORONARY ARTERY OF NATIVE HEART WITHOUT ANGINA PECTORIS: Primary | ICD-10-CM

## 2024-12-23 DIAGNOSIS — R06.09 EXERTIONAL DYSPNEA: ICD-10-CM

## 2024-12-23 DIAGNOSIS — I10 PRIMARY HYPERTENSION: ICD-10-CM

## 2024-12-23 DIAGNOSIS — I65.21 CAROTID STENOSIS, ASYMPTOMATIC, RIGHT: ICD-10-CM

## 2024-12-23 DIAGNOSIS — Z95.5 S/P DRUG ELUTING CORONARY STENT PLACEMENT: ICD-10-CM

## 2024-12-23 PROCEDURE — 1159F MED LIST DOCD IN RCRD: CPT | Performed by: INTERNAL MEDICINE

## 2024-12-23 PROCEDURE — 99214 OFFICE O/P EST MOD 30 MIN: CPT | Performed by: INTERNAL MEDICINE

## 2024-12-23 PROCEDURE — 93798 PHYS/QHP OP CAR RHAB W/ECG: CPT

## 2024-12-23 PROCEDURE — 1160F RVW MEDS BY RX/DR IN RCRD: CPT | Performed by: INTERNAL MEDICINE

## 2024-12-23 RX ORDER — DILTIAZEM HYDROCHLORIDE 60 MG/1
TABLET, FILM COATED ORAL
COMMUNITY

## 2024-12-23 RX ORDER — METOPROLOL SUCCINATE 25 MG/1
TABLET, EXTENDED RELEASE ORAL
Qty: 90 TABLET | Refills: 1 | Status: SHIPPED | OUTPATIENT
Start: 2024-12-23

## 2024-12-23 NOTE — PROGRESS NOTES
[facility]      Patient Name: Jed Correia Sr.  :1945  Age: 79 y.o.  Encounter Provider:  Jed Moody Jr, MD      Chief Complaint: Initial evaluation of patient with coronary artery disease      HPI  Jed Correia Sr. is a 79 y.o. male past medical history of coronary artery disease status post PCI  and premature atrial contractions who presents for follow-up evaluation.      Last clinic visit note: Patient previously followed by Dr. Flaco Stearns of the [place] [facility].  Intervention in  with no subsequent needs for intervention.  He is a very active gentleman walks about 2 miles or rides the stationary bike for 9 to 10 miles daily.  No chest pain or shortness of air.  No orthopnea, PND or edema.  He has occasional palpitations and irregular heart rate has been a chronic issue for him.  Holter monitor in 2022 showed 15% PACs but no sustained arrhythmias.  He denies dizziness syncope.  He has a history of carotid stenosis with 50 to 69% on the left on ultrasound carotids .  No focal neurological deficits or other neurological complaints.  He is an ex-smoker quit , drinks socially and denies illicit drug use.  History of brother with myocardial infarction followed by CABG.    Doing well since last visit.  Rapid severe progression of exertional dyspnea prompted cardiac catheterization on 2024.  Obstructive disease distal to previous RCA stent addressed by PCI with Dr. Payne.  Patient states complete resolution of symptoms.  He has been in cardiac rehab for the last 3 weeks and denies any chest pain or shortness of air that is out of proportion.  No orthopnea, PND or edema.  No palpitations, dizziness or syncope.  Blood pressure and heart rate are well-controlled today in clinic.    The following portions of the patient's history were reviewed and updated as appropriate: allergies, current medications, past family history, past medical  "history, past social history, past surgical history and problem list.      Review of Systems   Constitutional: Negative for chills and fever.   HENT:  Negative for hoarse voice and sore throat.    Eyes:  Negative for double vision and photophobia.   Cardiovascular:  Positive for palpitations. Negative for chest pain, leg swelling, near-syncope, orthopnea, paroxysmal nocturnal dyspnea and syncope.   Respiratory:  Negative for cough and wheezing.    Skin:  Negative for poor wound healing and rash.   Musculoskeletal:  Negative for arthritis and joint swelling.   Gastrointestinal:  Negative for bloating, abdominal pain, hematemesis and hematochezia.   Neurological:  Negative for dizziness and focal weakness.   Psychiatric/Behavioral:  Negative for depression and suicidal ideas.        OBJECTIVE:   Vital Signs  Vitals:    12/23/24 1125   BP: 132/76   Pulse: 72   SpO2: 96%     Estimated body mass index is 30.86 kg/m² as calculated from the following:    Height as of this encounter: 175.3 cm (69\").    Weight as of this encounter: 94.8 kg (209 lb).    Vitals reviewed.   Constitutional:       Appearance: Healthy appearance. Not in distress.   Neck:      Vascular: No JVR. JVD normal.   Pulmonary:      Effort: Pulmonary effort is normal.      Breath sounds: Normal breath sounds. No wheezing. No rhonchi. No rales.   Chest:      Chest wall: Not tender to palpatation.   Cardiovascular:      PMI at left midclavicular line. Normal rate. Regular rhythm. Normal S1. Normal S2.       Murmurs: There is a systolic murmur.      No gallop.  No click. No rub.   Pulses:     Intact distal pulses.   Edema:     Peripheral edema absent.   Abdominal:      General: Bowel sounds are normal.      Palpations: Abdomen is soft.      Tenderness: There is no abdominal tenderness.   Musculoskeletal: Normal range of motion.         General: No tenderness. Skin:     General: Skin is warm and dry.   Neurological:      General: No focal deficit present.      " Mental Status: Alert and oriented to person, place and time.       Procedures            ASSESSMENT:     CAD without angina  Carotid stenosis asymptomatic  Premature atrial contractions  Hypertension  Dyslipidemia    PLAN OF CARE:     CAD status post recent stent -he will continue aspirin and Plavix for another 2 months.  At that point we will back off to Plavix monotherapy.  Last LDL 63.  Continue current medical therapy.  I will refill his amlodipine.  Carotid stenosis asymptomatic -repeat lipids.  He follows with vascular surgery.  Premature atrial contractions -minimal impact on quality of life.  No sustained arrhythmias on recent Holter.  Monitor clinical progress.  Hypertension -better after PCI and addition of amlodipine 5 mg.  Continue same.  Dyslipidemia -as above  Murmur -mild aortic insufficiency on last echo.  Euvolemic in clinic today.    Return to clinic 6 months             Discharge Medications            Accurate as of December 23, 2024 11:27 AM. If you have any questions, ask your nurse or doctor.                Continue These Medications        Instructions Start Date   amLODIPine 5 MG tablet  Commonly known as: NORVASC   5 mg, Oral, Daily      aspirin 81 MG chewable tablet   81 mg, Oral, Daily      atorvastatin 20 MG tablet  Commonly known as: LIPITOR   20 mg, Oral, Nightly      clopidogrel 75 MG tablet  Commonly known as: PLAVIX   75 mg, Oral, Daily      Co Q 10 10 MG capsule   1 tablet, Oral, Daily      desonide 0.05 % lotion  Commonly known as: DESOWEN   2 Times Daily      ezetimibe 10 MG tablet  Commonly known as: ZETIA   10 mg, Oral, Daily      fish oil 1000 MG capsule capsule   1,000 mg, Daily With Breakfast      Fluzone High-Dose Quadrivalent 0.7 ML suspension prefilled syringe injection  Generic drug: Influenza Vac High-Dose Quad   Intramuscular      folic acid 1 MG tablet  Commonly known as: FOLVITE   1 mg, Daily      glucosamine-chondroitin 500-400 MG capsule capsule   2 capsules,  Daily      metoprolol succinate XL 25 MG 24 hr tablet  Commonly known as: TOPROL-XL   TAKE 1/2 TABLET BY MOUTH EVERY DAY, HOLD FOR HEART RATE LESS THAN 60 BPM      multivitamin with minerals tablet tablet   1 tablet, Daily      nitroglycerin 0.4 MG SL tablet  Commonly known as: NITROSTAT   0.4 mg, Sublingual, Every 5 Minutes PRN      pantoprazole 40 MG EC tablet  Commonly known as: PROTONIX   40 mg, Daily      PROBIOTIC DAILY PO   1 capsule, Daily      Symbicort 80-4.5 MCG/ACT inhaler  Generic drug: budesonide-formoterol   INHALE 2 PUFFS BY MOUTH TWICE DAILY. RINSE MOUTH WITH WATER AFTER USE FOR AFTERTASTE AND INCIDENCE OF CANDIDIASIS. DO NOT SWALLOW      valsartan 320 MG tablet  Commonly known as: DIOVAN   320 mg, Daily      venlafaxine 75 MG tablet  Commonly known as: EFFEXOR   Every 24 Hours      vitamin B-12 100 MCG tablet  Commonly known as: CYANOCOBALAMIN   50 mcg, Daily      vitamin B-6 50 MG tablet  Commonly known as: PYRIDOXINE   50 mg, Daily      Vitamin D-3 25 MCG (1000 UT) capsule   2,000 Units, Daily               Thank you for allowing me to participate in the care of your patient,      Sincerely,   Jed Moody MD  [facility]  12/23/24  11:27 EST

## 2024-12-27 ENCOUNTER — TREATMENT (OUTPATIENT)
Dept: CARDIAC REHAB | Facility: HOSPITAL | Age: 79
End: 2024-12-27
Payer: MEDICARE

## 2024-12-27 DIAGNOSIS — Z95.5 S/P DRUG ELUTING CORONARY STENT PLACEMENT: Primary | ICD-10-CM

## 2024-12-27 PROCEDURE — 93798 PHYS/QHP OP CAR RHAB W/ECG: CPT

## 2024-12-30 ENCOUNTER — TREATMENT (OUTPATIENT)
Dept: CARDIAC REHAB | Facility: HOSPITAL | Age: 79
End: 2024-12-30
Payer: MEDICARE

## 2024-12-30 DIAGNOSIS — Z95.5 S/P DRUG ELUTING CORONARY STENT PLACEMENT: Primary | ICD-10-CM

## 2024-12-30 PROCEDURE — 93798 PHYS/QHP OP CAR RHAB W/ECG: CPT

## 2025-01-02 ENCOUNTER — HOSPITAL ENCOUNTER (OUTPATIENT)
Facility: HOSPITAL | Age: 80
Setting detail: OBSERVATION
Discharge: HOME | End: 2025-01-04
Attending: EMERGENCY MEDICINE | Admitting: INTERNAL MEDICINE
Payer: MEDICARE

## 2025-01-02 DIAGNOSIS — T39.015A PLATELET DYSFUNCTION DUE TO ASPIRIN: ICD-10-CM

## 2025-01-02 DIAGNOSIS — K92.1 MELENA: ICD-10-CM

## 2025-01-02 DIAGNOSIS — K92.2 GASTROINTESTINAL HEMORRHAGE, UNSPECIFIED GASTROINTESTINAL HEMORRHAGE TYPE: Primary | ICD-10-CM

## 2025-01-02 DIAGNOSIS — D69.1 PLATELET DYSFUNCTION DUE TO ASPIRIN: ICD-10-CM

## 2025-01-02 DIAGNOSIS — R73.9 HYPERGLYCEMIA: ICD-10-CM

## 2025-01-02 PROBLEM — I10 HTN (HYPERTENSION): Status: ACTIVE | Noted: 2025-01-02

## 2025-01-02 PROBLEM — J45.909 ASTHMA: Status: ACTIVE | Noted: 2025-01-02

## 2025-01-02 PROBLEM — I25.10 CAD (CORONARY ARTERY DISEASE): Status: ACTIVE | Noted: 2025-01-02

## 2025-01-02 LAB
ABO GROUP BLD: NORMAL
ALBUMIN SERPL-MCNC: 4.4 G/DL (ref 3.5–5.2)
ALBUMIN/GLOB SERPL: 1.9 G/DL
ALP SERPL-CCNC: 70 U/L (ref 39–117)
ALT SERPL W P-5'-P-CCNC: 29 U/L (ref 1–41)
ANION GAP SERPL CALCULATED.3IONS-SCNC: 10.4 MMOL/L (ref 5–15)
AST SERPL-CCNC: 27 U/L (ref 1–40)
BASOPHILS # BLD AUTO: 0.07 10*3/MM3 (ref 0–0.2)
BASOPHILS NFR BLD AUTO: 0.8 % (ref 0–1.5)
BILIRUB SERPL-MCNC: 0.8 MG/DL (ref 0–1.2)
BLD GP AB SCN SERPL QL: NEGATIVE
BUN SERPL-MCNC: 15 MG/DL (ref 8–23)
BUN/CREAT SERPL: 17.2 (ref 7–25)
CALCIUM SPEC-SCNC: 9 MG/DL (ref 8.6–10.5)
CHLORIDE SERPL-SCNC: 105 MMOL/L (ref 98–107)
CO2 SERPL-SCNC: 23.6 MMOL/L (ref 22–29)
CREAT SERPL-MCNC: 0.87 MG/DL (ref 0.76–1.27)
D-LACTATE SERPL-SCNC: 1.5 MMOL/L (ref 0.5–2)
DEPRECATED RDW RBC AUTO: 39.7 FL (ref 37–54)
EGFRCR SERPLBLD CKD-EPI 2021: 87.2 ML/MIN/1.73
EOSINOPHIL # BLD AUTO: 0.18 10*3/MM3 (ref 0–0.4)
EOSINOPHIL NFR BLD AUTO: 2.1 % (ref 0.3–6.2)
ERYTHROCYTE [DISTWIDTH] IN BLOOD BY AUTOMATED COUNT: 11.7 % (ref 12.3–15.4)
GLOBULIN UR ELPH-MCNC: 2.3 GM/DL
GLUCOSE SERPL-MCNC: 122 MG/DL (ref 65–99)
HCT VFR BLD AUTO: 39.6 % (ref 37.5–51)
HGB BLD-MCNC: 13.4 G/DL (ref 13–17.7)
HOLD SPECIMEN: NORMAL
HOLD SPECIMEN: NORMAL
IMM GRANULOCYTES # BLD AUTO: 0.02 10*3/MM3 (ref 0–0.05)
IMM GRANULOCYTES NFR BLD AUTO: 0.2 % (ref 0–0.5)
LYMPHOCYTES # BLD AUTO: 1.9 10*3/MM3 (ref 0.7–3.1)
LYMPHOCYTES NFR BLD AUTO: 21.8 % (ref 19.6–45.3)
MCH RBC QN AUTO: 31.5 PG (ref 26.6–33)
MCHC RBC AUTO-ENTMCNC: 33.8 G/DL (ref 31.5–35.7)
MCV RBC AUTO: 93.2 FL (ref 79–97)
MONOCYTES # BLD AUTO: 0.91 10*3/MM3 (ref 0.1–0.9)
MONOCYTES NFR BLD AUTO: 10.4 % (ref 5–12)
NEUTROPHILS NFR BLD AUTO: 5.65 10*3/MM3 (ref 1.7–7)
NEUTROPHILS NFR BLD AUTO: 64.7 % (ref 42.7–76)
NRBC BLD AUTO-RTO: 0 /100 WBC (ref 0–0.2)
PLATELET # BLD AUTO: 252 10*3/MM3 (ref 140–450)
PMV BLD AUTO: 9.9 FL (ref 6–12)
POTASSIUM SERPL-SCNC: 4 MMOL/L (ref 3.5–5.2)
PROT SERPL-MCNC: 6.7 G/DL (ref 6–8.5)
RBC # BLD AUTO: 4.25 10*6/MM3 (ref 4.14–5.8)
RH BLD: POSITIVE
SODIUM SERPL-SCNC: 139 MMOL/L (ref 136–145)
T&S EXPIRATION DATE: NORMAL
WBC NRBC COR # BLD AUTO: 8.73 10*3/MM3 (ref 3.4–10.8)
WHOLE BLOOD HOLD COAG: NORMAL
WHOLE BLOOD HOLD SPECIMEN: NORMAL

## 2025-01-02 PROCEDURE — 96366 THER/PROPH/DIAG IV INF ADDON: CPT

## 2025-01-02 PROCEDURE — 86901 BLOOD TYPING SEROLOGIC RH(D): CPT

## 2025-01-02 PROCEDURE — G0378 HOSPITAL OBSERVATION PER HR: HCPCS

## 2025-01-02 PROCEDURE — 83605 ASSAY OF LACTIC ACID: CPT

## 2025-01-02 PROCEDURE — 86900 BLOOD TYPING SEROLOGIC ABO: CPT

## 2025-01-02 PROCEDURE — 86850 RBC ANTIBODY SCREEN: CPT

## 2025-01-02 PROCEDURE — 96376 TX/PRO/DX INJ SAME DRUG ADON: CPT

## 2025-01-02 PROCEDURE — 85025 COMPLETE CBC W/AUTO DIFF WBC: CPT

## 2025-01-02 PROCEDURE — 36415 COLL VENOUS BLD VENIPUNCTURE: CPT

## 2025-01-02 PROCEDURE — 96365 THER/PROPH/DIAG IV INF INIT: CPT

## 2025-01-02 PROCEDURE — 80053 COMPREHEN METABOLIC PANEL: CPT

## 2025-01-02 PROCEDURE — 99285 EMERGENCY DEPT VISIT HI MDM: CPT

## 2025-01-02 RX ORDER — FOLIC ACID 1 MG/1
1 TABLET ORAL DAILY
Status: DISCONTINUED | OUTPATIENT
Start: 2025-01-03 | End: 2025-01-04 | Stop reason: HOSPADM

## 2025-01-02 RX ORDER — BUDESONIDE AND FORMOTEROL FUMARATE DIHYDRATE 80; 4.5 UG/1; UG/1
2 AEROSOL RESPIRATORY (INHALATION)
Status: DISCONTINUED | OUTPATIENT
Start: 2025-01-02 | End: 2025-01-02

## 2025-01-02 RX ORDER — ATORVASTATIN CALCIUM 20 MG/1
20 TABLET, FILM COATED ORAL NIGHTLY
Status: DISCONTINUED | OUTPATIENT
Start: 2025-01-02 | End: 2025-01-04 | Stop reason: HOSPADM

## 2025-01-02 RX ORDER — SODIUM CHLORIDE 0.9 % (FLUSH) 0.9 %
10 SYRINGE (ML) INJECTION EVERY 12 HOURS SCHEDULED
Status: DISCONTINUED | OUTPATIENT
Start: 2025-01-02 | End: 2025-01-04 | Stop reason: HOSPADM

## 2025-01-02 RX ORDER — ONDANSETRON 2 MG/ML
4 INJECTION INTRAMUSCULAR; INTRAVENOUS EVERY 6 HOURS PRN
Status: DISCONTINUED | OUTPATIENT
Start: 2025-01-02 | End: 2025-01-04 | Stop reason: HOSPADM

## 2025-01-02 RX ORDER — AMLODIPINE BESYLATE 2.5 MG/1
5 TABLET ORAL DAILY
Status: DISCONTINUED | OUTPATIENT
Start: 2025-01-03 | End: 2025-01-04 | Stop reason: HOSPADM

## 2025-01-02 RX ORDER — VENLAFAXINE 75 MG/1
75 TABLET ORAL EVERY 24 HOURS
Status: DISCONTINUED | OUTPATIENT
Start: 2025-01-03 | End: 2025-01-04 | Stop reason: HOSPADM

## 2025-01-02 RX ORDER — SODIUM CHLORIDE 9 MG/ML
40 INJECTION, SOLUTION INTRAVENOUS AS NEEDED
Status: DISCONTINUED | OUTPATIENT
Start: 2025-01-02 | End: 2025-01-04 | Stop reason: HOSPADM

## 2025-01-02 RX ORDER — VALSARTAN 160 MG/1
320 TABLET ORAL DAILY
Status: DISCONTINUED | OUTPATIENT
Start: 2025-01-03 | End: 2025-01-04 | Stop reason: HOSPADM

## 2025-01-02 RX ORDER — SODIUM CHLORIDE 0.9 % (FLUSH) 0.9 %
10 SYRINGE (ML) INJECTION AS NEEDED
Status: DISCONTINUED | OUTPATIENT
Start: 2025-01-02 | End: 2025-01-04 | Stop reason: HOSPADM

## 2025-01-02 RX ORDER — ACETAMINOPHEN 325 MG/1
650 TABLET ORAL EVERY 6 HOURS PRN
Status: DISCONTINUED | OUTPATIENT
Start: 2025-01-02 | End: 2025-01-04 | Stop reason: HOSPADM

## 2025-01-02 RX ORDER — NITROGLYCERIN 0.4 MG/1
0.4 TABLET SUBLINGUAL
Status: DISCONTINUED | OUTPATIENT
Start: 2025-01-02 | End: 2025-01-04 | Stop reason: HOSPADM

## 2025-01-02 RX ORDER — BUDESONIDE AND FORMOTEROL FUMARATE DIHYDRATE 160; 4.5 UG/1; UG/1
1 AEROSOL RESPIRATORY (INHALATION)
Status: DISCONTINUED | OUTPATIENT
Start: 2025-01-02 | End: 2025-01-04 | Stop reason: HOSPADM

## 2025-01-02 RX ORDER — PANTOPRAZOLE SODIUM 40 MG/10ML
40 INJECTION, POWDER, LYOPHILIZED, FOR SOLUTION INTRAVENOUS ONCE
Status: COMPLETED | OUTPATIENT
Start: 2025-01-02 | End: 2025-01-02

## 2025-01-02 RX ORDER — UBIDECARENONE 75 MG
50 CAPSULE ORAL DAILY
Status: DISCONTINUED | OUTPATIENT
Start: 2025-01-03 | End: 2025-01-04 | Stop reason: HOSPADM

## 2025-01-02 RX ORDER — LANOLIN ALCOHOL/MO/W.PET/CERES
50 CREAM (GRAM) TOPICAL DAILY
Status: DISCONTINUED | OUTPATIENT
Start: 2025-01-03 | End: 2025-01-04 | Stop reason: HOSPADM

## 2025-01-02 RX ORDER — METOPROLOL SUCCINATE 25 MG/1
12.5 TABLET, EXTENDED RELEASE ORAL
Status: DISCONTINUED | OUTPATIENT
Start: 2025-01-03 | End: 2025-01-04 | Stop reason: HOSPADM

## 2025-01-02 RX ORDER — ONDANSETRON 4 MG/1
4 TABLET, ORALLY DISINTEGRATING ORAL EVERY 6 HOURS PRN
Status: DISCONTINUED | OUTPATIENT
Start: 2025-01-02 | End: 2025-01-04 | Stop reason: HOSPADM

## 2025-01-02 RX ADMIN — PANTOPRAZOLE SODIUM 8 MG/HR: 40 INJECTION, POWDER, FOR SOLUTION INTRAVENOUS at 22:54

## 2025-01-02 RX ADMIN — PANTOPRAZOLE SODIUM 40 MG: 40 INJECTION, POWDER, FOR SOLUTION INTRAVENOUS at 18:43

## 2025-01-02 RX ADMIN — Medication 10 ML: at 22:54

## 2025-01-02 NOTE — Clinical Note
Level of Care: Med/Surg [1]   Diagnosis: GIB (gastrointestinal bleeding) [[id number]]   Admitting Physician: NAVIN MAURER [[id number]]   Attending Physician: NAVIN MAURER [[id number]]

## 2025-01-02 NOTE — ED PROVIDER NOTES
EMERGENCY DEPARTMENT ENCOUNTER  Room Number:  21/21  Date of encounter:  1/2/2025  PCP: Myke Landaverde MD  Patient Care Team:  Myke Landaverde MD as PCP - General (Internal Medicine)  Nyaan Finney MD as Consulting Physician (Sleep Medicine)  Tito Stearns MD as Consulting Physician (Cardiology)  Jed Moody Jr., MD as Consulting Physician (Cardiology)     HPI:  Context: Jed Correia Sr. is a 79 y.o. male who presents to the ED c/o chief complaint of dark tarry stools.  Patient reports he has been having it for last 3 days, approximately 2 episodes a day.  Patient denies any gross blood in the stools.  No abdominal pain, no nausea or vomiting.  Patient denies any history of GI bleeds, does have history of colon cancer.  Patient is not anticoagulated but is on aspirin and Plavix.    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    Unstable angina 11/05/2024     Resolved Ambulatory Problems     Diagnosis Date Noted    No Resolved Ambulatory Problems     Past Medical History:   Diagnosis Date    Ankle sprain     Arthritis     Arthritis of back     Arthritis of neck     Cancer     Cervical disc disorder     Coronary artery disease     Fracture, foot 1993    H/O arthroscopy of left knee     H/O arthroscopy of right knee     Hyperlipidemia     Hypertension     Knee swelling 2023    Tear of meniscus of knee     Tendinitis of knee 2023    Thoracic disc disorder        PAST SURGICAL HISTORY  Past  Surgical History:   Procedure Laterality Date    CARDIAC CATHETERIZATION  2014    stents    CARDIAC CATHETERIZATION N/A 11/7/2024    Procedure: Left Heart Cath;  Surgeon: Adan Payne MD;  Location: [facility] CATH INVASIVE LOCATION;  Service: Cardiology;  Laterality: N/A;    CARDIAC CATHETERIZATION N/A 11/7/2024    Procedure: Left ventriculography;  Surgeon: Adan Payne MD;  Location: [facility] CATH INVASIVE LOCATION;  Service: Cardiology;  Laterality: N/A;    CARDIAC CATHETERIZATION N/A 11/7/2024    Procedure: Percutaneous Coronary Intervention;  Surgeon: Adan Payne MD;  Location: [facility] CATH INVASIVE LOCATION;  Service: Cardiology;  Laterality: N/A;    CARDIAC CATHETERIZATION N/A 11/7/2024    Procedure: Stent PETER coronary;  Surgeon: Adan Payne MD;  Location: [facility] CATH INVASIVE LOCATION;  Service: Cardiology;  Laterality: N/A;    COLON SURGERY      COLON RESECTION, COLON CANCER REMOVAL    COLONOSCOPY N/A 08/17/2018    Procedure: COLONOSCOPY TO CECUM/TI WITH POLYPECTOMY ( COLD BX);  Surgeon: Moise Garcia MD;  Location: [facility] ENDOSCOPY;  Service: Gastroenterology    COLONOSCOPY N/A 8/21/2023    Procedure: COLONOSCOPY TO CECUM;  Surgeon: Ki Ash MD;  Location: [facility] OR;  Service: Gastroenterology;  Laterality: N/A;  POLYPS, HEMORRHOIDS    EYE SURGERY      CATARACTS, MACULAR HOLE REPAIR    INTERVENTIONAL RADIOLOGY PROCEDURE N/A 11/7/2024    Procedure: Intravascular Ultrasound;  Surgeon: Adan Payne MD;  Location: [facility] CATH INVASIVE LOCATION;  Service: Cardiology;  Laterality: N/A;    KNEE ARTHROSCOPY Right 01/13/2020    Procedure: KNEE ARTHROSCOPY, PARTIAL MEDIAL AND LATERAL MENISECTOMY, AND DEBRIDEMENT OF ARTHRITIS;  Surgeon: Mercedes Falk MD;  Location: [facility] OR OSC;  Service: Orthopedics    KNEE SURGERY      PROSTATE SURGERY      CANCER    SKIN BIOPSY      5X-BASAL CELL CARCINOMA       FAMILY HISTORY  Family History   Problem Relation Age of Onset    Cancer  Sister     Cancer Brother     Alcohol abuse Brother     Hypertension Brother     ALS Brother     Cancer Brother     Malig Hyperthermia Neg Hx        SOCIAL HISTORY  Social History     Socioeconomic History    Marital status:    Tobacco Use    Smoking status: Former     Current packs/day: 0.00     Average packs/day: 0.5 packs/day for 17.3 years (8.7 ttl pk-yrs)     Types: Cigarettes, Pipe     Start date: 1960     Quit date: 1978     Years since quittin.0     Passive exposure: Past (family smoked in the  home)    Smokeless tobacco: Never    Tobacco comments:     Light smoker during this period   Vaping Use    Vaping status: Never Used   Substance and Sexual Activity    Alcohol use: Yes     Alcohol/week: 2.0 standard drinks of alcohol     Types: 2 Cans of beer per week     Comment: 2 drinks at [employer]    Drug use: No    Sexual activity: Not Currently     Partners: Female     Comment: Prostate removal nerve damage.       ALLERGIES  Bacitracin-polymyxin b, Iodine, Latex, Neomycin-bacitracin zn-polymyx, and Povidone-iodine    The patient's allergies have been reviewed    REVIEW OF SYSTEMS  All systems reviewed and negative except for those discussed in HPI.     PHYSICAL EXAM  I have reviewed the triage vital signs and nursing notes.  ED Triage Vitals   Temp Heart Rate Resp BP SpO2   25 1404 25 1404 25 1404 25 1406 25 1404   98 °F (36.7 °C) 75 16 158/79 96 %      Temp src Heart Rate Source Patient Position BP Location FiO2 (%)   -- -- 25 1406 25 1406 --     Sitting Right arm        General: No acute distress.  HENT: NCAT, PERRL, Nares patent.  Eyes: no scleral icterus.  Neck: trachea midline, no ROM limitations.  CV: regular rhythm, regular rate.  Respiratory: normal effort, CTAB.  Abdomen: soft, nondistended, NTTP, no rebound tenderness, no guarding or rigidity.   exam performed, ED nurse present throughout as chaperone.  External rectal exam  unremarkable, digital rectal exam performed, dark appearing stool, Hemoccult positive,  passed.  Musculoskeletal: no deformity.  Neuro: alert, moves all extremities, follows commands.  Skin: warm, dry.    LAB RESULTS  Recent Results (from the past 24 hours)   Comprehensive Metabolic Panel    Collection Time: 01/02/25  2:16 PM    Specimen: Arm, Left; Blood   Result Value Ref Range    Glucose 122 (H) 65 - 99 mg/dL    BUN 15 8 - 23 mg/dL    Creatinine 0.87 0.76 - 1.27 mg/dL    Sodium 139 136 - 145 mmol/L    Potassium 4.0 3.5 - 5.2 mmol/L    Chloride 105 98 - 107 mmol/L    CO2 23.6 22.0 - 29.0 mmol/L    Calcium 9.0 8.6 - 10.5 mg/dL    Total Protein 6.7 6.0 - 8.5 g/dL    Albumin 4.4 3.5 - 5.2 g/dL    ALT (SGPT) 29 1 - 41 U/L    AST (SGOT) 27 1 - 40 U/L    Alkaline Phosphatase 70 39 - 117 U/L    Total Bilirubin 0.8 0.0 - 1.2 mg/dL    Globulin 2.3 gm/dL    A/G Ratio 1.9 g/dL    BUN/Creatinine Ratio 17.2 7.0 - 25.0    Anion Gap 10.4 5.0 - 15.0 mmol/L    eGFR 87.2 >60.0 mL/min/1.73   Type & Screen    Collection Time: 01/02/25  2:16 PM    Specimen: Arm, Left; Blood   Result Value Ref Range    ABO Type A     RH type Positive     Antibody Screen Negative     T&S Expiration Date 1/5/2025 11:59:59 PM    Lactic Acid, Plasma    Collection Time: 01/02/25  2:16 PM    Specimen: Arm, Left; Blood   Result Value Ref Range    Lactate 1.5 0.5 - 2.0 mmol/L   Green Top (Gel)    Collection Time: 01/02/25  2:16 PM   Result Value Ref Range    Extra Tube Hold for add-ons.    Lavender Top    Collection Time: 01/02/25  2:16 PM   Result Value Ref Range    Extra Tube hold for add-on    Gold Top - SST    Collection Time: 01/02/25  2:16 PM   Result Value Ref Range    Extra Tube Hold for add-ons.    Light Blue Top    Collection Time: 01/02/25  2:16 PM   Result Value Ref Range    Extra Tube Hold for add-ons.    CBC Auto Differential    Collection Time: 01/02/25  2:16 PM    Specimen: Arm, Left; Blood   Result Value Ref Range    WBC 8.73  3.40 - 10.80 10*3/mm3    RBC 4.25 4.14 - 5.80 10*6/mm3    Hemoglobin 13.4 13.0 - 17.7 g/dL    Hematocrit 39.6 37.5 - 51.0 %    MCV 93.2 79.0 - 97.0 fL    MCH 31.5 26.6 - 33.0 pg    MCHC 33.8 31.5 - 35.7 g/dL    RDW 11.7 (L) 12.3 - 15.4 %    RDW-SD 39.7 37.0 - 54.0 fl    MPV 9.9 6.0 - 12.0 fL    Platelets 252 140 - 450 10*3/mm3    Neutrophil % 64.7 42.7 - 76.0 %    Lymphocyte % 21.8 19.6 - 45.3 %    Monocyte % 10.4 5.0 - 12.0 %    Eosinophil % 2.1 0.3 - 6.2 %    Basophil % 0.8 0.0 - 1.5 %    Immature Grans % 0.2 0.0 - 0.5 %    Neutrophils, Absolute 5.65 1.70 - 7.00 10*3/mm3    Lymphocytes, Absolute 1.90 0.70 - 3.10 10*3/mm3    Monocytes, Absolute 0.91 (H) 0.10 - 0.90 10*3/mm3    Eosinophils, Absolute 0.18 0.00 - 0.40 10*3/mm3    Basophils, Absolute 0.07 0.00 - 0.20 10*3/mm3    Immature Grans, Absolute 0.02 0.00 - 0.05 10*3/mm3    nRBC 0.0 0.0 - 0.2 /100 WBC       I ordered the above labs and reviewed the results.    RADIOLOGY  No Radiology Exams Resulted Within Past 24 Hours    I ordered the above noted radiological studies. I reviewed the images and results. I agree with the radiologist interpretation.    PROCEDURES  Procedures    MEDICATIONS GIVEN IN ER  Medications   sodium chloride 0.9 % flush 10 mL (has no administration in time range)   pantoprazole (PROTONIX) injection 40 mg (40 mg Intravenous Given 1/2/25 1843)       PROGRESS, DATA ANALYSIS, CONSULTS, AND MEDICAL DECISION MAKING  A complete history and physical exam have been performed.  All available laboratory and imaging results have been reviewed by myself prior to disposition.    MDM    After the initial H&P, I discussed pertinent information from history and physical exam with patient/family.  Discussed differential diagnosis.  Discussed plan for ED evaluation/workup/treatment.  All questions answered.  Patient/family is agreeable with plan.  ED Course as of 01/02/25 1925   Thu Jan 02, 2025 1820 79-year-old male with history of coronary artery disease  on aspirin and Plavix presents with signs of upper GI bleed.  Giving Protonix.  Plan for admission for GI evaluation.  Consulting hospitalist. [JG]   1924 Phone call with SERENA Smith MedSur observation.  Discussed the patient, relevant history, exam, diagnostics, ED findings/progress, and concerns. They agree to admit the patient to MedSurg observation. Care assumed by the admitting physician at this time.     [JG]      ED Course User Index  [JG] Sina Sol MD       AS OF 19:25 EST VITALS:    BP - 162/73  HR - 61  TEMP - 98 °F (36.7 °C)  O2 SATS - 98%    DIAGNOSIS  Final diagnoses:   Gastrointestinal hemorrhage, unspecified gastrointestinal hemorrhage type   Platelet dysfunction due to aspirin   Hyperglycemia         DISPOSITION  ADMISSION    Discussed treatment plan and reason for admission with pt/family and admitting physician.  Pt/family voiced understanding of the plan for admission for further testing/treatment as needed.        Sina Sol MD  01/02/25 1925

## 2025-01-03 ENCOUNTER — PREP FOR SURGERY (OUTPATIENT)
Dept: OTHER | Facility: HOSPITAL | Age: 80
End: 2025-01-03
Payer: MEDICARE

## 2025-01-03 ENCOUNTER — APPOINTMENT (OUTPATIENT)
Dept: CARDIAC REHAB | Facility: HOSPITAL | Age: 80
End: 2025-01-03
Payer: MEDICARE

## 2025-01-03 ENCOUNTER — ANESTHESIA EVENT (OUTPATIENT)
Dept: GASTROENTEROLOGY | Facility: HOSPITAL | Age: 80
End: 2025-01-03
Payer: MEDICARE

## 2025-01-03 ENCOUNTER — HOSPITAL ENCOUNTER (OUTPATIENT)
Facility: HOSPITAL | Age: 80
Setting detail: HOSPITAL OUTPATIENT SURGERY
End: 2025-01-03
Attending: INTERNAL MEDICINE | Admitting: INTERNAL MEDICINE
Payer: MEDICARE

## 2025-01-03 ENCOUNTER — ANESTHESIA (OUTPATIENT)
Dept: GASTROENTEROLOGY | Facility: HOSPITAL | Age: 80
End: 2025-01-03
Payer: MEDICARE

## 2025-01-03 DIAGNOSIS — K92.1 MELENA: Primary | ICD-10-CM

## 2025-01-03 LAB
DEPRECATED RDW RBC AUTO: 40.5 FL (ref 37–54)
ERYTHROCYTE [DISTWIDTH] IN BLOOD BY AUTOMATED COUNT: 11.8 % (ref 12.3–15.4)
FERRITIN SERPL-MCNC: 36.8 NG/ML (ref 30–400)
FOLATE SERPL-MCNC: >20 NG/ML (ref 4.78–24.2)
HCT VFR BLD AUTO: 40.3 % (ref 37.5–51)
HEMOCCULT STL QL: POSITIVE
HGB BLD-MCNC: 12.9 G/DL (ref 13–17.7)
HGB BLD-MCNC: 14.2 G/DL (ref 13–17.7)
IRON 24H UR-MRATE: 88 MCG/DL (ref 59–158)
IRON SATN MFR SERPL: 24 % (ref 20–50)
MCH RBC QN AUTO: 30.4 PG (ref 26.6–33)
MCHC RBC AUTO-ENTMCNC: 32 G/DL (ref 31.5–35.7)
MCV RBC AUTO: 94.8 FL (ref 79–97)
PLATELET # BLD AUTO: 232 10*3/MM3 (ref 140–450)
PMV BLD AUTO: 10 FL (ref 6–12)
QT INTERVAL: 425 MS
QTC INTERVAL: 428 MS
RBC # BLD AUTO: 4.25 10*6/MM3 (ref 4.14–5.8)
TIBC SERPL-MCNC: 373 MCG/DL (ref 298–536)
TRANSFERRIN SERPL-MCNC: 250 MG/DL (ref 200–360)
VIT B12 BLD-MCNC: >2000 PG/ML (ref 211–946)
WBC NRBC COR # BLD AUTO: 9.6 10*3/MM3 (ref 3.4–10.8)

## 2025-01-03 PROCEDURE — 94799 UNLISTED PULMONARY SVC/PX: CPT

## 2025-01-03 PROCEDURE — 94640 AIRWAY INHALATION TREATMENT: CPT

## 2025-01-03 PROCEDURE — 63710000001 BUDESONIDE-FORMOTEROL 160-4.5 MCG/ACT AEROSOL 6 G INHALER: Performed by: INTERNAL MEDICINE

## 2025-01-03 PROCEDURE — 63710000001 POLYETHYLENE GLYCOL 17 GM/SCOOP POWDER 238 G BOTTLE: Performed by: INTERNAL MEDICINE

## 2025-01-03 PROCEDURE — G0378 HOSPITAL OBSERVATION PER HR: HCPCS

## 2025-01-03 PROCEDURE — A9270 NON-COVERED ITEM OR SERVICE: HCPCS | Performed by: INTERNAL MEDICINE

## 2025-01-03 PROCEDURE — 82728 ASSAY OF FERRITIN: CPT | Performed by: HOSPITALIST

## 2025-01-03 PROCEDURE — 63710000001 BISACODYL 5 MG TABLET DELAYED-RELEASE: Performed by: INTERNAL MEDICINE

## 2025-01-03 PROCEDURE — 25010000002 PROPOFOL 1000 MG/100ML EMULSION

## 2025-01-03 PROCEDURE — 63710000001 ASPIRIN 81 MG TABLET DELAYED-RELEASE: Performed by: INTERNAL MEDICINE

## 2025-01-03 PROCEDURE — 25010000002 LIDOCAINE 2% SOLUTION

## 2025-01-03 PROCEDURE — 43235 EGD DIAGNOSTIC BRUSH WASH: CPT | Performed by: INTERNAL MEDICINE

## 2025-01-03 PROCEDURE — 96366 THER/PROPH/DIAG IV INF ADDON: CPT

## 2025-01-03 PROCEDURE — 93005 ELECTROCARDIOGRAM TRACING: CPT | Performed by: INTERNAL MEDICINE

## 2025-01-03 PROCEDURE — 85027 COMPLETE CBC AUTOMATED: CPT | Performed by: HOSPITALIST

## 2025-01-03 PROCEDURE — 99222 1ST HOSP IP/OBS MODERATE 55: CPT | Performed by: INTERNAL MEDICINE

## 2025-01-03 PROCEDURE — 84466 ASSAY OF TRANSFERRIN: CPT | Performed by: HOSPITALIST

## 2025-01-03 PROCEDURE — 83540 ASSAY OF IRON: CPT | Performed by: HOSPITALIST

## 2025-01-03 PROCEDURE — 25010000002 PROPOFOL 10 MG/ML EMULSION

## 2025-01-03 PROCEDURE — 63710000001 ATORVASTATIN 20 MG TABLET: Performed by: INTERNAL MEDICINE

## 2025-01-03 PROCEDURE — 82272 OCCULT BLD FECES 1-3 TESTS: CPT | Performed by: INTERNAL MEDICINE

## 2025-01-03 PROCEDURE — 25010000002 GLYCOPYRROLATE 0.2 MG/ML SOLUTION

## 2025-01-03 PROCEDURE — 82607 VITAMIN B-12: CPT | Performed by: INTERNAL MEDICINE

## 2025-01-03 PROCEDURE — 99214 OFFICE O/P EST MOD 30 MIN: CPT | Performed by: NURSE PRACTITIONER

## 2025-01-03 PROCEDURE — 82746 ASSAY OF FOLIC ACID SERUM: CPT | Performed by: INTERNAL MEDICINE

## 2025-01-03 PROCEDURE — 93005 ELECTROCARDIOGRAM TRACING: CPT | Performed by: NURSE PRACTITIONER

## 2025-01-03 PROCEDURE — 85018 HEMOGLOBIN: CPT | Performed by: INTERNAL MEDICINE

## 2025-01-03 PROCEDURE — 25810000003 SODIUM CHLORIDE 0.9 % SOLUTION: Performed by: INTERNAL MEDICINE

## 2025-01-03 RX ORDER — BISACODYL 5 MG/1
20 TABLET, DELAYED RELEASE ORAL ONCE
Status: COMPLETED | OUTPATIENT
Start: 2025-01-03 | End: 2025-01-03

## 2025-01-03 RX ORDER — GLYCOPYRROLATE 0.2 MG/ML
INJECTION INTRAMUSCULAR; INTRAVENOUS AS NEEDED
Status: DISCONTINUED | OUTPATIENT
Start: 2025-01-03 | End: 2025-01-03 | Stop reason: SURG

## 2025-01-03 RX ORDER — SODIUM CHLORIDE 9 MG/ML
50 INJECTION, SOLUTION INTRAVENOUS CONTINUOUS
Status: ACTIVE | OUTPATIENT
Start: 2025-01-03 | End: 2025-01-03

## 2025-01-03 RX ORDER — PROPOFOL 10 MG/ML
INJECTION, EMULSION INTRAVENOUS AS NEEDED
Status: DISCONTINUED | OUTPATIENT
Start: 2025-01-03 | End: 2025-01-03 | Stop reason: SURG

## 2025-01-03 RX ORDER — POLYETHYLENE GLYCOL 3350 17 G/17G
0.5 POWDER, FOR SOLUTION ORAL EVERY 12 HOURS
Status: COMPLETED | OUTPATIENT
Start: 2025-01-03 | End: 2025-01-04

## 2025-01-03 RX ORDER — LIDOCAINE HYDROCHLORIDE 20 MG/ML
INJECTION, SOLUTION INFILTRATION; PERINEURAL AS NEEDED
Status: DISCONTINUED | OUTPATIENT
Start: 2025-01-03 | End: 2025-01-03 | Stop reason: SURG

## 2025-01-03 RX ORDER — ASPIRIN 81 MG/1
81 TABLET ORAL DAILY
Status: DISCONTINUED | OUTPATIENT
Start: 2025-01-03 | End: 2025-01-04 | Stop reason: HOSPADM

## 2025-01-03 RX ADMIN — VALSARTAN 320 MG: 320 TABLET, FILM COATED ORAL at 11:13

## 2025-01-03 RX ADMIN — VENLAFAXINE HYDROCHLORIDE 75 MG: 75 TABLET ORAL at 11:13

## 2025-01-03 RX ADMIN — AMLODIPINE BESYLATE 5 MG: 2.5 TABLET ORAL at 09:41

## 2025-01-03 RX ADMIN — Medication 50 MG: at 11:13

## 2025-01-03 RX ADMIN — PANTOPRAZOLE SODIUM 8 MG/HR: 40 INJECTION, POWDER, FOR SOLUTION INTRAVENOUS at 02:16

## 2025-01-03 RX ADMIN — PANTOPRAZOLE SODIUM 8 MG/HR: 40 INJECTION, POWDER, FOR SOLUTION INTRAVENOUS at 09:41

## 2025-01-03 RX ADMIN — Medication 10 ML: at 20:14

## 2025-01-03 RX ADMIN — METOPROLOL SUCCINATE 12.5 MG: 25 TABLET, EXTENDED RELEASE ORAL at 09:41

## 2025-01-03 RX ADMIN — ATORVASTATIN CALCIUM 20 MG: 20 TABLET, FILM COATED ORAL at 20:14

## 2025-01-03 RX ADMIN — Medication 50 MCG: at 11:13

## 2025-01-03 RX ADMIN — Medication 10 ML: at 09:41

## 2025-01-03 RX ADMIN — BISACODYL 20 MG: 5 TABLET, COATED ORAL at 18:08

## 2025-01-03 RX ADMIN — LIDOCAINE HYDROCHLORIDE 100 MG: 20 INJECTION, SOLUTION INFILTRATION; PERINEURAL at 14:34

## 2025-01-03 RX ADMIN — PROPOFOL 160 MCG/KG/MIN: 10 INJECTION, EMULSION INTRAVENOUS at 14:34

## 2025-01-03 RX ADMIN — SODIUM CHLORIDE 50 ML/HR: 9 INJECTION, SOLUTION INTRAVENOUS at 13:48

## 2025-01-03 RX ADMIN — POLYETHYLENE GLYCOL 3350 0.5 BOTTLE: 17 POWDER, FOR SOLUTION ORAL at 18:08

## 2025-01-03 RX ADMIN — GLYCOPYRROLATE 0.2 MG: 0.2 INJECTION INTRAMUSCULAR; INTRAVENOUS at 14:34

## 2025-01-03 RX ADMIN — PROPOFOL INJECTABLE EMULSION 8 MG: 10 INJECTION, EMULSION INTRAVENOUS at 14:34

## 2025-01-03 RX ADMIN — FOLIC ACID 1 MG: 1 TABLET ORAL at 09:41

## 2025-01-03 RX ADMIN — ASPIRIN 81 MG: 81 TABLET, COATED ORAL at 18:08

## 2025-01-03 RX ADMIN — BUDESONIDE AND FORMOTEROL FUMARATE DIHYDRATE 1 PUFF: 160; 4.5 AEROSOL RESPIRATORY (INHALATION) at 22:34

## 2025-01-03 NOTE — PLAN OF CARE
Problem: Adult Inpatient Plan of Care  Goal: Absence of Hospital-Acquired Illness or Injury  Intervention: Identify and Manage Fall Risk  Recent Flowsheet Documentation  Taken 1/3/2025 0440 by Ema Clayton RN  Safety Promotion/Fall Prevention: safety round/check completed  Taken 1/3/2025 0210 by Ema Clayton RN  Safety Promotion/Fall Prevention: safety round/check completed  Taken 1/3/2025 0030 by Ema Clayton RN  Safety Promotion/Fall Prevention: safety round/check completed  Taken 1/2/2025 2215 by Ema Clayton RN  Safety Promotion/Fall Prevention: safety round/check completed  Taken 1/2/2025 2045 by Ema Clayton RN  Safety Promotion/Fall Prevention: safety round/check completed  Intervention: Prevent and Manage VTE (Venous Thromboembolism) Risk  Recent Flowsheet Documentation  Taken 1/2/2025 2215 by Ema Clayton RN  VTE Prevention/Management: SCDs (sequential compression devices) off  Goal: Optimal Comfort and Wellbeing  Intervention: Provide Person-Centered Care  Recent Flowsheet Documentation  Taken 1/2/2025 2215 by Ema Clayton RN  Trust Relationship/Rapport: care explained  Goal: Readiness for Transition of Care  Intervention: Mutually Develop Transition Plan  Recent Flowsheet Documentation  Taken 1/2/2025 2218 by Ema Clayton RN  Transportation Anticipated: family or friend will provide  Patient/Family Anticipated Services at Transition: none  Patient/Family Anticipates Transition to: home with family  Taken 1/2/2025 2216 by Ema Clayton RN  Equipment Currently Used at Home: none   Goal Outcome Evaluation:

## 2025-01-03 NOTE — ED NOTES
Nursing report ED to floor  Jed Correia Sr.  79 y.o.  male    HPI :  HPI  Stated Reason for Visit: pt from home with c/o black, tarry stools x 4 days.  takes plavix and asa  History Obtained From: patient    Chief Complaint  Chief Complaint   Patient presents with    Black or Bloody Stool       Admitting doctor:   Ariel Little MD    Admitting diagnosis:   The primary encounter diagnosis was Gastrointestinal hemorrhage, unspecified gastrointestinal hemorrhage type. Diagnoses of Platelet dysfunction due to aspirin and Hyperglycemia were also pertinent to this visit.    Code status:   Current Code Status       Date Active Code Status Order ID Comments User Context       1/2/2025 2012 CPR (Attempt to Resuscitate) [id number]  Ariel Little MD ED        Question Answer    Code Status (Patient has no pulse and is not breathing) CPR (Attempt to Resuscitate)    Medical Interventions (Patient has pulse or is breathing) Full Support    Level Of Support Discussed With Patient                    Allergies:   Bacitracin-polymyxin b, Iodine, Latex, Neomycin-bacitracin zn-polymyx, and Povidone-iodine    Isolation:   No active isolations    Intake and Output  No intake or output data in the 24 hours ending 01/02/25 2037    Weight:   There were no vitals filed for this visit.    Most recent vitals:   Vitals:    01/02/25 1404 01/02/25 1406 01/02/25 1821   BP:  158/79 162/73   BP Location:  Right arm    Patient Position:  Sitting    Pulse: 75  61   Resp: 16     Temp: 98 °F (36.7 °C)     SpO2: 96%  98%       Active LDAs/IV Access:   Lines, Drains & Airways       Active LDAs       Name Placement date Placement time Site Days    Peripheral IV 01/02/25 1811 Anterior;Left;Upper Forearm 01/02/25  1811  Forearm  less than 1                    Labs (abnormal labs have a star):   Labs Reviewed   COMPREHENSIVE METABOLIC PANEL - Abnormal; Notable for the following components:       Result Value    Glucose 122 (*)     All other  components within normal limits    Narrative:     GFR Categories in Chronic Kidney Disease (CKD)      GFR Category          GFR (mL/min/1.73)    Interpretation  G1                     90 or greater         Normal or high (1)  G2                      60-89                Mild decrease (1)  G3a                   45-59                Mild to moderate decrease  G3b                   30-44                Moderate to severe decrease  G4                    15-29                Severe decrease  G5                    14 or less           Kidney failure          (1)In the absence of evidence of kidney disease, neither GFR category G1 or G2 fulfill the criteria for CKD.    eGFR calculation 2021 CKD-EPI creatinine equation, which does not include race as a factor   CBC WITH AUTO DIFFERENTIAL - Abnormal; Notable for the following components:    RDW 11.7 (*)     Monocytes, Absolute 0.91 (*)     All other components within normal limits   LACTIC ACID, PLASMA - Normal   RAINBOW DRAW    Narrative:     The following orders were created for panel order [place] Draw.  Procedure                               Abnormality         Status                     ---------                               -----------         ------                     Green Top (Gel)[[id number]]                                  Final result               Lavender Top[[id number]]                                     Final result               Gold Top - SST[[id number]]                                   Final result               Light Blue Top[[id number]]                                   Final result                 Please view results for these tests on the individual orders.   OCCULT BLOOD X 1, STOOL   POCT OCCULT BLOOD STOOL (ED ONLY)   TYPE AND SCREEN   CBC AND DIFFERENTIAL    Narrative:     The following orders were created for panel order CBC & Differential.  Procedure                               Abnormality         Status                     ---------                                -----------         ------                     CBC Auto Differential[[id number]]        Abnormal            Final result                 Please view results for these tests on the individual orders.   GREEN TOP   LAVENDER TOP   GOLD TOP - SST   LIGHT BLUE TOP       EKG:   No orders to display       Meds given in ED:   Medications   sodium chloride 0.9 % flush 10 mL (has no administration in time range)   pantoprazole (PROTONIX) 40 mg in sodium chloride 0.9 % 100 mL (0.4 mg/mL) MBP (has no administration in time range)   sodium chloride 0.9 % flush 10 mL (has no administration in time range)   sodium chloride 0.9 % flush 10 mL (has no administration in time range)   sodium chloride 0.9 % infusion 40 mL (has no administration in time range)   nitroglycerin (NITROSTAT) SL tablet 0.4 mg (has no administration in time range)   acetaminophen (TYLENOL) tablet 650 mg (has no administration in time range)   ondansetron ODT (ZOFRAN-ODT) disintegrating tablet 4 mg (has no administration in time range)     Or   ondansetron (ZOFRAN) injection 4 mg (has no administration in time range)   melatonin tablet 5 mg (has no administration in time range)   pantoprazole (PROTONIX) injection 40 mg (40 mg Intravenous Given 25)       Imaging results:  No radiology results for the last day    Ambulatory status:   -     Social issues:   Social History     Socioeconomic History    Marital status:    Tobacco Use    Smoking status: Former     Current packs/day: 0.00     Average packs/day: 0.5 packs/day for 17.3 years (8.7 ttl pk-yrs)     Types: Cigarettes, Pipe     Start date: 1960     Quit date: 1978     Years since quittin.0     Passive exposure: Past (family smoked in the  home)    Smokeless tobacco: Never    Tobacco comments:     Light smoker during this period   Vaping Use    Vaping status: Never Used   Substance and Sexual Activity    Alcohol use: Yes     Alcohol/week: 2.0 standard drinks of  alcohol     Types: 2 Cans of beer per week     Comment: 2 drinks at social fumctioms    Drug use: No    Sexual activity: Not Currently     Partners: Female     Comment: Prostate removal nerve damage.       Peripheral Neurovascular  Peripheral Neurovascular (Adult)  Peripheral Neurovascular WDL: WDL    Neuro Cognitive  Neuro Cognitive (Adult)  Cognitive/Neuro/Behavioral WDL: WDL    Learning  Learning Assessment  Learning Readiness and Ability: no barriers identified    Respiratory  Respiratory WDL  Respiratory WDL: WDL    Abdominal Pain       Pain Assessments  Pain (Adult)  (0-10) Pain Rating: Rest: 0  (0-10) Pain Rating: Activity: 0    NIH Stroke Scale       Pablo Stokes RN  01/02/25 20:38 EST

## 2025-01-03 NOTE — PROGRESS NOTES
Name: Jed Correia Sr. ADMIT: 2025   : 1945  PCP: Myke Landaverde MD    MRN: [id number] LOS: 0 days   AGE/SEX: 79 y.o. male  ROOM: [facility]/     Subjective   Subjective   No acute events. Patient denies new complaints. No abdominal pain or melena/hematochezia. No nausea or vomiting. Denies chest pain and dyspnea. No family at bedside.     Objective   Objective   Vital Signs  Temp:  [97.5 °F (36.4 °C)-98.1 °F (36.7 °C)] 98 °F (36.7 °C)  Heart Rate:  [54-78] 60  Resp:  [13-20] 13  BP: (105-169)/(49-90) 114/55  SpO2:  [93 %-100 %] 93 %  on  Flow (L/min) (Oxygen Therapy):  [10] 10;   Device (Oxygen Therapy): room air  Body mass index is 30.42 kg/m².  Physical Exam  Vitals and nursing note reviewed.   Constitutional:       General: He is not in acute distress.     Appearance: He is not toxic-appearing or diaphoretic.   HENT:      Head: Normocephalic and atraumatic.      Nose: Nose normal.      Mouth/Throat:      Mouth: Mucous membranes are moist.      Pharynx: Oropharynx is clear.   Eyes:      Conjunctiva/sclera: Conjunctivae normal.      Pupils: Pupils are equal, round, and reactive to light.   Cardiovascular:      Rate and Rhythm: Normal rate and regular rhythm.      Pulses: Normal pulses.   Pulmonary:      Effort: Pulmonary effort is normal.      Breath sounds: Normal breath sounds.   Abdominal:      General: Bowel sounds are normal. There is no distension.      Palpations: Abdomen is soft.      Tenderness: There is no abdominal tenderness.   Musculoskeletal:         General: No swelling or tenderness.      Cervical back: Neck supple.   Skin:     General: Skin is warm and dry.      Capillary Refill: Capillary refill takes less than 2 seconds.   Neurological:      General: No focal deficit present.      Mental Status: He is alert and oriented to person, place, and time.   Psychiatric:         Mood and Affect: Mood normal.         Behavior: Behavior normal.       Results Review     I reviewed the  "patient's new clinical results.  Results from last 7 days   Lab Units 01/03/25  0607 01/02/25  1416   WBC 10*3/mm3 9.60 8.73   HEMOGLOBIN g/dL 12.9* 13.4   PLATELETS 10*3/mm3 232 252     Results from last 7 days   Lab Units 01/02/25  1416   SODIUM mmol/L 139   POTASSIUM mmol/L 4.0   CHLORIDE mmol/L 105   CO2 mmol/L 23.6   BUN mg/dL 15   CREATININE mg/dL 0.87   GLUCOSE mg/dL 122*   EGFR mL/min/1.73 87.2     Results from last 7 days   Lab Units 01/02/25  1416   ALBUMIN g/dL 4.4   BILIRUBIN mg/dL 0.8   ALK PHOS U/L 70   AST (SGOT) U/L 27   ALT (SGPT) U/L 29     Results from last 7 days   Lab Units 01/02/25  1416   CALCIUM mg/dL 9.0   ALBUMIN g/dL 4.4     Results from last 7 days   Lab Units 01/02/25  1416   LACTATE mmol/L 1.5     No results found for: \"HGBA1C\", \"POCGLU\"    No radiology results for the last day    I have personally reviewed all medications:  Scheduled Medications  amLODIPine, 5 mg, Oral, Daily  aspirin, 81 mg, Oral, Daily  atorvastatin, 20 mg, Oral, Nightly  bisacodyl, 20 mg, Oral, Once  budesonide-formoterol, 1 puff, Inhalation, BID - RT  folic acid, 1 mg, Oral, Daily  metoprolol succinate XL, 12.5 mg, Oral, Q24H  polyethylene glycol, 0.5 bottle, Oral, Q12H  sodium chloride, 10 mL, Intravenous, Q12H  valsartan, 320 mg, Oral, Daily  venlafaxine, 75 mg, Oral, Q24H  vitamin B-12, 50 mcg, Oral, Daily  vitamin B-6, 50 mg, Oral, Daily    Infusions  sodium chloride, 50 mL/hr, Last Rate: 50 mL/hr (01/03/25 1429)    Diet  Diet: Liquid; Clear Liquid; Fluid Consistency: Thin (IDDSI 0)  NPO Diet NPO Type: Strict NPO    I have personally reviewed:  [x]  Laboratory   []  Microbiology   []  Radiology   [x]  EKG/Telemetry  []  Cardiology/Vascular   []  Pathology    [x]  Records    Assessment/Plan     Active Hospital Problems    Diagnosis  POA    **GIB (gastrointestinal bleeding) [K92.2]  Yes    Melena [K92.1]  Unknown    HTN (hypertension) [I10]  Unknown    CAD (coronary artery disease) [I25.10]  Unknown    Asthma " [J45.909]  Unknown    Gastrointestinal hemorrhage [K92.2]  Yes      Resolved Hospital Problems   No resolved problems to display.   GI Bleeding with Acute Posthemorrhagic Anemia  - no further bleeding since arrival, hemodynamically stable  - anemia is mild, will follow H&H later   - check iron stores, B12, FA  - complicated by CAD with recent RCA stenting-hold ASA and plavix  - GI consulted    HTN/CAD  - BP acceptable, no anginal symptoms, had RCA stent in November 2024  - continue on lipitor, norvasc, metoprolol, and valsartan  - holding ASA and plavix as above-need to resume as soon as possible given recent stenting and monitor closely in the interim  - cardiology consulted    D/w patient's son,  Jed Correia, with patient's permission    SCDs for DVT prophylaxis.  Full code.  Discussed with patient, family, and nursing staff.  Anticipate discharge home in 1-2 days.  Expected discharge date/ time has not been documented.      Myke Cardona MD  [facility]  01/03/25  16:17 EST

## 2025-01-03 NOTE — PLAN OF CARE
Pt. VS WNL. No c/o pain. Pt. A&O x4. Pt. Up with a standby assist, adequate UOP, awaiting occult stool. Pt. Protonix gtt discontinued. Pt. Had EGD today, see results. Pt. To have colonoscopy in a.m., bowel prep started per orders. Pt. Encouraged to use IS, SCDs for DVT, awaiting pump, sleeves at bedside. Pt. Resting comfortably at present, will continue to monitor closely for the remainder of this RN's shift.      Problem: Adult Inpatient Plan of Care  Goal: Plan of Care Review  Outcome: Progressing  Flowsheets (Taken 1/3/2025 8513)  Progress: improving  Plan of Care Reviewed With: patient

## 2025-01-03 NOTE — H&P
HISTORY AND PHYSICAL   [facility]        Date of Admission: 2025  Patient Identification:  Name: Jed Correia Sr.  Age: 79 y.o.  Sex: male  :  1945  MRN: [id number]                     Primary Care Physician: Myke Landaverde MD    Chief Complaint: Black tarry stools    History of Present Illness:   Mr. Correia is a wonderfully pleasant 79-year-old male who is father to Jed Correia Jr./oncology here at [facility], [place].  This patient presented with dark tarry stools that have been happening over the last 3 days.  He denies any abdominal pain associated with it.  He has had no upper GI bleed or hematemesis.  He states that he is on aspirin and Plavix and has been on that for some time.  He has had cardiac stents placed in the past though he had more recent stents placed in November with Dr. Payne and normally follows with  with [facility].  He took his aspirin and Plavix just the other day and currently is on hold.  He denies any nausea or vomiting.  He has no fever chills chest pain or troubles breathing.  There is a past medical history of colon cancer.  He is currently resting comfortably in ER room 43.    Past Medical History:  Past Medical History:   Diagnosis Date    Ankle sprain     Arthritis     Arthritis of back     Arthritis of neck     Cancer     COLON, PROSTATE, SKIN    Cervical disc disorder     Coronary artery disease     Fracture, foot     H/O arthroscopy of left knee     H/O arthroscopy of right knee     Hyperlipidemia     Hypertension     Knee swelling     Positional sleep apnea 2019    Home sleep study.  AHI normal at 2/h for total monitoring time.  When supine, mildly abnormal at 6.4 events per hour.  No sleep-related hypoxia.    Tear of meniscus of knee     Tendinitis of knee     Thoracic disc disorder      Past Surgical History:  Past Surgical History:   Procedure Laterality Date    CARDIAC CATHETERIZATION      stents    CARDIAC  CATHETERIZATION N/A 11/7/2024    Procedure: Left Heart Cath;  Surgeon: Adan Payne MD;  Location: [facility] CATH INVASIVE LOCATION;  Service: Cardiology;  Laterality: N/A;    CARDIAC CATHETERIZATION N/A 11/7/2024    Procedure: Left ventriculography;  Surgeon: Aadn Payne MD;  Location: [facility] CATH INVASIVE LOCATION;  Service: Cardiology;  Laterality: N/A;    CARDIAC CATHETERIZATION N/A 11/7/2024    Procedure: Percutaneous Coronary Intervention;  Surgeon: Adan Payne MD;  Location: [facility] CATH INVASIVE LOCATION;  Service: Cardiology;  Laterality: N/A;    CARDIAC CATHETERIZATION N/A 11/7/2024    Procedure: Stent PETER coronary;  Surgeon: Adan Payne MD;  Location: [facility] CATH INVASIVE LOCATION;  Service: Cardiology;  Laterality: N/A;    COLON SURGERY      COLON RESECTION, COLON CANCER REMOVAL    COLONOSCOPY N/A 08/17/2018    Procedure: COLONOSCOPY TO CECUM/TI WITH POLYPECTOMY ( COLD BX);  Surgeon: Moise Garcia MD;  Location: [facility] ENDOSCOPY;  Service: Gastroenterology    COLONOSCOPY N/A 8/21/2023    Procedure: COLONOSCOPY TO CECUM;  Surgeon: Ki Ash MD;  Location: [facility] OR;  Service: Gastroenterology;  Laterality: N/A;  POLYPS, HEMORRHOIDS    EYE SURGERY      CATARACTS, MACULAR HOLE REPAIR    INTERVENTIONAL RADIOLOGY PROCEDURE N/A 11/7/2024    Procedure: Intravascular Ultrasound;  Surgeon: Adan Payne MD;  Location: [facility] CATH INVASIVE LOCATION;  Service: Cardiology;  Laterality: N/A;    KNEE ARTHROSCOPY Right 01/13/2020    Procedure: KNEE ARTHROSCOPY, PARTIAL MEDIAL AND LATERAL MENISECTOMY, AND DEBRIDEMENT OF ARTHRITIS;  Surgeon: Mercedes Falk MD;  Location: [facility] OR [facility];  Service: Orthopedics    KNEE SURGERY      PROSTATE SURGERY      CANCER    SKIN BIOPSY      5X-BASAL CELL CARCINOMA      Home Meds:  (Not in a hospital admission)      Allergies:  Allergies   Allergen Reactions    Bacitracin-Polymyxin B Unknown - Low Severity    Iodine Rash    Latex Rash     Neomycin-Bacitracin Zn-Polymyx Unknown - Low Severity    Povidone-Iodine Rash     Immunizations:  Immunization History   Administered Date(s) Administered    COVID-19 (PFIZER) 12YRS+ (COMIRNATY) 2024, 10/28/2024    COVID-19 (PFIZER) BIVALENT 12+YRS 2022    COVID-19 (PFIZER) Purple Cap Monovalent 2021, 2021, 2022    Fluzone High-Dose 65+yrs 10/14/2021, 10/12/2023     Social History:   Social History     Social History Narrative    Not on file     Social History     Socioeconomic History    Marital status:    Tobacco Use    Smoking status: Former     Current packs/day: 0.00     Average packs/day: 0.5 packs/day for 17.3 years (8.7 ttl pk-yrs)     Types: Cigarettes, Pipe     Start date: 1960     Quit date: 1978     Years since quittin.0     Passive exposure: Past (family smoked in the  home)    Smokeless tobacco: Never    Tobacco comments:     Light smoker during this period   Vaping Use    Vaping status: Never Used   Substance and Sexual Activity    Alcohol use: Yes     Alcohol/week: 2.0 standard drinks of alcohol     Types: 2 Cans of beer per week     Comment: 2 drinks at [employer]    Drug use: No    Sexual activity: Not Currently     Partners: Female     Comment: Prostate removal nerve damage.       Family History:  Family History   Problem Relation Age of Onset    Cancer Sister     Cancer Brother     Alcohol abuse Brother     Hypertension Brother     ALS Brother     Cancer Brother     Malig Hyperthermia Neg Hx         Review of Systems  See history of present illness and past medical history.  Patient [place] fever chills night sweats.  Denies any nausea vomiting abdominal pain but admits to dark tarry stools.  Denies any chest pain potation cough shortness of breath focal changes vegetable taste or sound.  Denies any loss of consciousness or loss of function.  Remainder of ROS is negative.    Objective:  T Max 24 hrs: Temp (24hrs), Av °F (36.7 °C), Min:98 °F  (36.7 °C), Max:98 °F (36.7 °C)    Vitals Ranges:   Temp:  [98 °F (36.7 °C)] 98 °F (36.7 °C)  Heart Rate:  [61-75] 61  Resp:  [16] 16  BP: (158-162)/(73-79) 162/73      Exam:  /73   Pulse 61   Temp 98 °F (36.7 °C)   Resp 16   SpO2 98%     General Appearance:    Alert, cooperative, for stational very pleasant and appreciative, alert and oriented x 3, nontoxic in appearance   Head:    Normocephalic, without obvious abnormality, atraumatic   Eyes:    PERRLA/EOM's intact, both eyes   Ears:    Normal external ear canals, both ears   Nose:   Nares normal, septum midline, mucosa normal, no drainage    or sinus tenderness   Throat:   Lips, mucosa, and tongue normal   Neck:   Supple, no JVD       Lungs:     Clear to auscultation bilaterally, respirations unlabored   Chest Wall:    No tenderness or deformity    Heart:    Regular rate and rhythm, S1 and S2 normal   Abdomen:     Soft, nontender, bowel sounds active all four quadrants, certainly no rebound or guarding   Extremities: Moving all with baseline strength, no cyanosis or edema   Pulses:   2+ and symmetric all extremities   Skin:   Skin color, texture, turgor normal,       Neurologic:   CNII-XII intact, no FD      .    Data Review:  Labs in chart were reviewed.             Imaging Results (All)       None              Assessment:  Active Hospital Problems    Diagnosis  POA    **GIB (gastrointestinal bleeding) [K92.2]  Yes    HTN (hypertension) [I10]  Unknown    CAD (coronary artery disease) [I25.10]  Unknown    Asthma [J45.909]  Unknown      Resolved Hospital Problems   No resolved problems to display.       Plan:    GI bleed though hemoglobin stable at this point in time   -On ASA and Plavix and patient reports having stent placed in November.  Currently holding these medications as he did take them yesterday and will consult his cardiologist  to ensure it is safe to hold these   -Liquid diet for tonight/n.p.o. after midnight   -GI to see in  consult in a.m.   -IV Protonix drip   -Repeat CBC in a.m. with iron panel and ferritin   -Past history of colon cancer      HTN stable-okay to continue meds    CAD-Toprol    Asthma without acute exacerbation    SCDs for additional prophylaxis          Further recommendations to follow as clinical course unfolds    Ariel Little MD  1/2/2025  20:10 EST

## 2025-01-03 NOTE — CONSULTS
[facility]  Initial Inpatient Consult Note    Referring Provider: Dr. Little    Reason for Consultation: Melena, heme positive stool    Subjective     History of present illness:    79 y.o. male, patient of Dr. Ki Ash, that we are asked to see for melena and heme positive stool.    He reports that his stool has been dark black in color for the past several days.  He has had no other symptoms.  Specifically he denies abdominal pain nausea vomiting heartburn dysphagia or loss of appetite.  No other change in his bowel habits.  He does not take any NSAIDs that are not prescribed.  He reports a remote history of a bleeding duodenal ulcer.  He does not recall any inciting issue that triggered that bleed.  He had an endoscopy at that time.  His last dose of Plavix was 2 days ago.    Hemoglobin was 13.4 on admission down to 12.9 today.  He was heme positive in the emergency room.  Baseline hemoglobin appears to be around 14-14.5.  Indices are normocytic.  Platelet count is normal.    Patient's past medical history significant for personal history of colon cancer and colon polyps.  He also has a history of coronary artery disease.  He had a cardiac cath with placement of PETER 11/7/2024.  He is followed by Dr. Payne.  He is on aspirin and Plavix.    Colonoscopy 8/21/2023-polyps, hemorrhoids, rectal anastomosis    Past Medical History:  Past Medical History:   Diagnosis Date    Ankle sprain     Arthritis     Arthritis of back     Arthritis of neck     Cancer     COLON, PROSTATE, SKIN    Cervical disc disorder     Coronary artery disease     Fracture, foot 1993    H/O arthroscopy of left knee     H/O arthroscopy of right knee     Hyperlipidemia     Hypertension     Knee swelling 2023    Positional sleep apnea 09/18/2019    Home sleep study.  AHI normal at 2/h for total monitoring time.  When supine, mildly abnormal at 6.4 events per hour.  No sleep-related hypoxia.    Tear of meniscus of knee      Tendinitis of knee 2023    Thoracic disc disorder      Past Surgical History:  Past Surgical History:   Procedure Laterality Date    CARDIAC CATHETERIZATION  2014    stents    CARDIAC CATHETERIZATION N/A 11/7/2024    Procedure: Left Heart Cath;  Surgeon: Adan Payne MD;  Location: [facility] CATH INVASIVE LOCATION;  Service: Cardiology;  Laterality: N/A;    CARDIAC CATHETERIZATION N/A 11/7/2024    Procedure: Left ventriculography;  Surgeon: Adan Payne MD;  Location: [facility] CATH INVASIVE LOCATION;  Service: Cardiology;  Laterality: N/A;    CARDIAC CATHETERIZATION N/A 11/7/2024    Procedure: Percutaneous Coronary Intervention;  Surgeon: Adan Payne MD;  Location: [facility] CATH INVASIVE LOCATION;  Service: Cardiology;  Laterality: N/A;    CARDIAC CATHETERIZATION N/A 11/7/2024    Procedure: Stent PETER coronary;  Surgeon: Adan Payne MD;  Location: [facility] CATH INVASIVE LOCATION;  Service: Cardiology;  Laterality: N/A;    COLON SURGERY      COLON RESECTION, COLON CANCER REMOVAL    COLONOSCOPY N/A 08/17/2018    Procedure: COLONOSCOPY TO CECUM/TI WITH POLYPECTOMY ( COLD BX);  Surgeon: Moise Garcia MD;  Location: [facility] ENDOSCOPY;  Service: Gastroenterology    COLONOSCOPY N/A 8/21/2023    Procedure: COLONOSCOPY TO CECUM;  Surgeon: Ki Ash MD;  Location: [facility] OR;  Service: Gastroenterology;  Laterality: N/A;  POLYPS, HEMORRHOIDS    EYE SURGERY      CATARACTS, MACULAR HOLE REPAIR    INTERVENTIONAL RADIOLOGY PROCEDURE N/A 11/7/2024    Procedure: Intravascular Ultrasound;  Surgeon: Adan Payne MD;  Location: [facility] CATH INVASIVE LOCATION;  Service: Cardiology;  Laterality: N/A;    KNEE ARTHROSCOPY Right 01/13/2020    Procedure: KNEE ARTHROSCOPY, PARTIAL MEDIAL AND LATERAL MENISECTOMY, AND DEBRIDEMENT OF ARTHRITIS;  Surgeon: Mercedes Falk MD;  Location: [facility] OR OSC;  Service: Orthopedics    KNEE SURGERY      PROSTATE SURGERY      CANCER    SKIN BIOPSY      5X-BASAL CELL  CARCINOMA      Social History:   Social History     Tobacco Use    Smoking status: Former     Current packs/day: 0.00     Average packs/day: 0.5 packs/day for 17.3 years (8.7 ttl pk-yrs)     Types: Cigarettes, Pipe     Start date: 1960     Quit date: 1978     Years since quittin.0     Passive exposure: Past (family smoked in the  home)    Smokeless tobacco: Never    Tobacco comments:     Light smoker during this period   Substance Use Topics    Alcohol use: Yes     Alcohol/week: 2.0 standard drinks of alcohol     Types: 2 Cans of beer per week     Comment: 2 drinks at [employer]      Family History:  Family History   Problem Relation Age of Onset    Cancer Sister     Cancer Brother     Alcohol abuse Brother     Hypertension Brother     ALS Brother     Cancer Brother     Malig Hyperthermia Neg Hx        Home Meds:  (Not in a hospital admission)    Current Meds:   amLODIPine, 5 mg, Oral, Daily  atorvastatin, 20 mg, Oral, Nightly  budesonide-formoterol, 1 puff, Inhalation, BID - RT  folic acid, 1 mg, Oral, Daily  metoprolol succinate XL, 12.5 mg, Oral, Q24H  sodium chloride, 10 mL, Intravenous, Q12H  valsartan, 320 mg, Oral, Daily  venlafaxine, 75 mg, Oral, Q24H  vitamin B-12, 50 mcg, Oral, Daily  vitamin B-6, 50 mg, Oral, Daily      Allergies:  Allergies   Allergen Reactions    Bacitracin-Polymyxin B Unknown - Low Severity    Iodine Rash    Latex Rash    Neomycin-Bacitracin Zn-Polymyx Unknown - Low Severity    Povidone-Iodine Rash     Review of Systems  Pertinent items are noted in HPI     Objective     Vital Signs  Temp:  [97.5 °F (36.4 °C)-98 °F (36.7 °C)] 97.5 °F (36.4 °C)  Heart Rate:  [54-75] 72  Resp:  [16-18] 18  BP: (151-169)/(73-90) 151/75  Physical Exam:  General Appearance:    Alert, cooperative, in no acute distress   Head:    Normocephalic, without obvious abnormality, atraumatic   Eyes:          conjunctivae and sclerae normal, no   icterus   Throat:   no thrush, oral mucosa moist  "  Neck:   Supple, no adenopathy   Lungs:     Clear to auscultation bilaterally    Heart:    Regular rhythm and normal rate    Chest Wall:    No abnormalities observed   Abdomen:     Soft, nondistended, nontender; normal bowel sounds   Extremities:   no edema, no redness   Skin:   No bruising or rash   Psychiatric:  normal mood and insight     Results Review:   I reviewed the patient's new clinical results.    Results from last 7 days   Lab Units 01/03/25  0607 01/02/25  1416   WBC 10*3/mm3 9.60 8.73   HEMOGLOBIN g/dL 12.9* 13.4   HEMATOCRIT % 40.3 39.6   PLATELETS 10*3/mm3 232 252     Results from last 7 days   Lab Units 01/02/25  1416   SODIUM mmol/L 139   POTASSIUM mmol/L 4.0   CHLORIDE mmol/L 105   CO2 mmol/L 23.6   BUN mg/dL 15   CREATININE mg/dL 0.87   CALCIUM mg/dL 9.0   BILIRUBIN mg/dL 0.8   ALK PHOS U/L 70   ALT (SGPT) U/L 29   AST (SGOT) U/L 27   GLUCOSE mg/dL 122*         No results found for: \"LIPASE\"    Radiology:  No orders to display       Assessment & Plan   Active Hospital Problems    Diagnosis     **GIB (gastrointestinal bleeding)     Melena     HTN (hypertension)     CAD (coronary artery disease)     Asthma     Gastrointestinal hemorrhage        Assessment:  Melena, heme positive stool  CAD with recent stent placement in November-on outpatient aspirin and Plavix  Personal history of colon cancer  Anemia    Plan:  Continue IV Protonix  He has had dark stool which was heme positive and a decline in his hemoglobin from his baseline.  He is on both aspirin and Plavix with a recent stent placement.  I think endoscopy is necessary to ensure he is not having a significant bleed and to allow him to resume his Plavix as quickly as possible given his recent stent placement.  He has had a fairly recent colonoscopy and if a source of blood loss can be determined from the endoscopy, this may not need to be repeated.  Certainly if we are unable to localize a bleeding source, may have to do a colonoscopy " tomorrow.  Continue to follow H&H-currently hemodynamically stable and not requiring transfusion.  He has been off Plavix for about 48 hours and I discussed with the patient and his son that we would address the bleeding but not would be overly aggressive otherwise given recent Plavix use.      I discussed the patients findings and my recommendations with patient and his son Dr. Jed Correia. .          Rina Grant M.D.  [facility]  [phone]

## 2025-01-03 NOTE — ANESTHESIA PREPROCEDURE EVALUATION
" Anesthesia Evaluation     Patient summary reviewed and Nursing notes reviewed   NPO Solid Status: > 8 hours  NPO Liquid Status: > 2 hours           Airway   Mallampati: II  TM distance: >3 FB  Neck ROM: full  No difficulty expected  Dental      Pulmonary    (+) a smoker Former, asthma,sleep apnea (appliance)  Cardiovascular   Exercise tolerance: good (4-7 METS)    ECG reviewed  PT is on anticoagulation therapy  Patient on routine beta blocker and Beta blocker given within 24 hours of surgery    (+) hypertension, CAD, hyperlipidemia  (-) angina      Neuro/Psych  GI/Hepatic/Renal/Endo    (+) GI bleeding     Musculoskeletal     Abdominal    Substance History   (+) alcohol use     OB/GYN          Other   arthritis,   history of cancer                      Anesthesia Plan    ASA 3     MAC     (I have reviewed the patient's history and chart with the patient, including all pertinent laboratory results and imaging. I have explained the risks of anesthesia including but not limited to dental damage, corneal abrasion, nerve injury, MI, stroke, aspiration, and death. Patient has agreed to proceed.      /79 (BP Location: Right arm, Patient Position: Lying)   Pulse 78   Temp 36.7 °C (98.1 °F) (Oral)   Resp 20   Ht 175.3 cm (69\")   Wt 93.4 kg (206 lb)   SpO2 99%   BMI 30.42 kg/m²   )  intravenous induction     Anesthetic plan, risks, benefits, and alternatives have been provided, discussed and informed consent has been obtained with: patient.        CODE STATUS:    Level Of Support Discussed With: Patient  Code Status (Patient has no pulse and is not breathing): CPR (Attempt to Resuscitate)  Medical Interventions (Patient has pulse or is breathing): Full Support      "

## 2025-01-03 NOTE — PLAN OF CARE
Goal Outcome Evaluation:         Vss no complaints of pain. Pt rested well during shift. Protonix gtt infusing. Consults called to cards and GI. Labs in am.

## 2025-01-03 NOTE — CONSULTS
[facility]        Patient Name: Jed Correia Sr.  Age/Sex: 79 y.o. male  : 1945  MRN: [id number]    Date of Admission: 2025  Date of Encounter Visit: 25  Encounter Provider: KARIS Hudson  Referring Provider: Ariel Little MD  Place of Service: [facility]   Primary Cardiologist: Dr. Jed Moody    Subjective:     Chief Complaint:   GI bleed, CAD, status post PCI 2024    History of Present Illness:    Jed Correia Sr. is a 79 y.o. male who has known hypertension, hyperlipidemia, premature atrial contractions (PACs), and carotid artery stenosis.      In , he was diagnosed with coronary artery disease and underwent PCI/stent to the RCA.      He has a known heart murmur.  In 2023, echocardiogram showed LVEF 65%, grade 1 diastolic dysfunction, moderate aortic valve calcification, mild aortic regurgitation, mild tricuspid regurgitation, and trace tricuspid regurgitation.    In 2024, he developed severe progressive exertional dyspnea.  He underwent angioplasty and drug-eluting stent placement to the mid RCA (Xience Stephanie 4.0 x 38 mm) and angioplasty alone to the proximal RCA.    Two weeks ago, he saw Dr. Moody in the main in the office.  He was recommended to continue with aspirin and clopidogrel for 2 more months. He would then discontinue the aspirin and continue with clopidogrel monotherapy.     Yesterday, he presented to [facility] ED with dark tarry stools x 3 days.  He denied any abdominal pain.  He was diagnosed with an acute GI bleed with heme positive stools.  Hemoglobin baseline is 14-14.5 and he was 13.4 yesterday.  12.9 today.  His clopidogrel and aspirin have been placed on hold.  Gastroenterology consulted and plans for EGD today.  On IV Protonix.  He reports a history of a bleeding duodenal ulcer.     He is resting in bed with his son, Dr. Jed Correia at the bedside.  He has not had a bowel  movement today.  He denies chest pain, shortness of breath, palpitations, or dizziness.    Past Medical History:  Past Medical History:   Diagnosis Date    Ankle sprain     Arthritis     Arthritis of back     Arthritis of neck     Cancer     COLON, PROSTATE, SKIN    Cervical disc disorder     Coronary artery disease     Fracture, foot 1993    H/O arthroscopy of left knee     H/O arthroscopy of right knee     Hyperlipidemia     Hypertension     Knee swelling 2023    Positional sleep apnea 09/18/2019    Home sleep study.  AHI normal at 2/h for total monitoring time.  When supine, mildly abnormal at 6.4 events per hour.  No sleep-related hypoxia.    Tear of meniscus of knee     Tendinitis of knee 2023    Thoracic disc disorder        Past Surgical History:   Procedure Laterality Date    CARDIAC CATHETERIZATION  2014    stents    CARDIAC CATHETERIZATION N/A 11/7/2024    Procedure: Left Heart Cath;  Surgeon: Adan Payne MD;  Location: [facility] CATH INVASIVE LOCATION;  Service: Cardiology;  Laterality: N/A;    CARDIAC CATHETERIZATION N/A 11/7/2024    Procedure: Left ventriculography;  Surgeon: Adan Payne MD;  Location: [facility] CATH INVASIVE LOCATION;  Service: Cardiology;  Laterality: N/A;    CARDIAC CATHETERIZATION N/A 11/7/2024    Procedure: Percutaneous Coronary Intervention;  Surgeon: Adan Payne MD;  Location: [facility] CATH INVASIVE LOCATION;  Service: Cardiology;  Laterality: N/A;    CARDIAC CATHETERIZATION N/A 11/7/2024    Procedure: Stent PETER coronary;  Surgeon: Adan Payne MD;  Location: [facility] CATH INVASIVE LOCATION;  Service: Cardiology;  Laterality: N/A;    COLON SURGERY      COLON RESECTION, COLON CANCER REMOVAL    COLONOSCOPY N/A 08/17/2018    Procedure: COLONOSCOPY TO CECUM/TI WITH POLYPECTOMY ( COLD BX);  Surgeon: Moise Garcia MD;  Location: [facility] ENDOSCOPY;  Service: Gastroenterology    COLONOSCOPY N/A 8/21/2023    Procedure: COLONOSCOPY TO CECUM;  Surgeon: Ki Ash  MD;  Location: [facility] MAIN OR;  Service: Gastroenterology;  Laterality: N/A;  POLYPS, HEMORRHOIDS    EYE SURGERY      CATARACTS, MACULAR HOLE REPAIR    INTERVENTIONAL RADIOLOGY PROCEDURE N/A 2024    Procedure: Intravascular Ultrasound;  Surgeon: Adan Payne MD;  Location: [facility] CATH INVASIVE LOCATION;  Service: Cardiology;  Laterality: N/A;    KNEE ARTHROSCOPY Right 2020    Procedure: KNEE ARTHROSCOPY, PARTIAL MEDIAL AND LATERAL MENISECTOMY, AND DEBRIDEMENT OF ARTHRITIS;  Surgeon: Mercedes Falk MD;  Location: [facility] OR OSC;  Service: Orthopedics    KNEE SURGERY      PROSTATE SURGERY      CANCER    SKIN BIOPSY      5X-BASAL CELL CARCINOMA         Allergies:  Allergies   Allergen Reactions    Bacitracin-Polymyxin B Unknown - Low Severity    Iodine Rash    Latex Rash    Neomycin-Bacitracin Zn-Polymyx Unknown - Low Severity    Povidone-Iodine Rash       Past Social History:  Social History     Socioeconomic History    Marital status:    Tobacco Use    Smoking status: Former     Current packs/day: 0.00     Average packs/day: 0.5 packs/day for 17.3 years (8.7 ttl pk-yrs)     Types: Cigarettes, Pipe     Start date: 1960     Quit date: 1978     Years since quittin.0     Passive exposure: Past (family smoked in the  home)    Smokeless tobacco: Never    Tobacco comments:     Light smoker during this period   Vaping Use    Vaping status: Never Used   Substance and Sexual Activity    Alcohol use: Yes     Alcohol/week: 2.0 standard drinks of alcohol     Types: 2 Cans of beer per week     Comment: 2 drinks at [employer]    Drug use: No    Sexual activity: Not Currently     Partners: Female     Comment: Prostate removal nerve damage.       Past Family History:  Family History   Problem Relation Age of Onset    Cancer Sister     Cancer Brother     Alcohol abuse Brother     Hypertension Brother     ALS Brother     Cancer Brother     Malig Hyperthermia Neg Hx        Review of Systems    Constitutional: Negative.   Cardiovascular: Negative.    Respiratory: Negative.     Neurological: Negative.          Objective:   Temp:  [97.5 °F (36.4 °C)-98.1 °F (36.7 °C)] 98.1 °F (36.7 °C)  Heart Rate:  [54-75] 72  Resp:  [16-18] 18  BP: (135-169)/(73-90) 135/76   No intake or output data in the 24 hours ending 01/03/25 1056  Body mass index is 30.42 kg/m².      01/02/25  2215   Weight: 93.4 kg (206 lb)       Physical Exam    Vitals Reviewed.   General Appearance: No acute distress, well developed and well nourished.   Respiratory: No signs of respiratory distress. Respiration rhythm and depth normal. Clear to auscultation. No rales, crackles, rhonchi, or wheezing auscultated.   Cardiovascular:  Jugular Venous Pressure: Normal  Heart Rate and Rhythm: Normal, Heart Sounds: Normal S1 and S2. No S3 or S4 noted.  Murmurs: No murmurs noted. No rubs, thrills, or gallops.   Lower Extremities: No edema noted.  Psychiatric: Patient alert and oriented to person, place, and time. Speech and behavior appropriate. Normal mood and affect.      Lab Review:   Results from last 7 days   Lab Units 01/02/25  1416   SODIUM mmol/L 139   POTASSIUM mmol/L 4.0   CHLORIDE mmol/L 105   CO2 mmol/L 23.6   BUN mg/dL 15   CREATININE mg/dL 0.87   GLUCOSE mg/dL 122*   CALCIUM mg/dL 9.0   AST (SGOT) U/L 27   ALT (SGPT) U/L 29         Results from last 7 days   Lab Units 01/03/25  0607 01/02/25  1416   WBC 10*3/mm3 9.60 8.73   HEMOGLOBIN g/dL 12.9* 13.4   HEMATOCRIT % 40.3 39.6   PLATELETS 10*3/mm3 232 252     EKG:           My interpretation sinus arrhythmia which is not new for patient.    Assessment:       GIB (gastrointestinal bleeding)    HTN (hypertension)    CAD (coronary artery disease)    Asthma    Gastrointestinal hemorrhage    Melena        Plan:     1.  Acute GI bleed.  Hgb 12.9.  On IV Protonix and plans for endoscopy per GI. Clopidogrel and aspirin currently on hold.  I discussed with Dr. Reji Carranza.  If no bleeding source  identified on endoscopy, resume clopidogrel 75 mg 1 tablet daily and stop aspirin.  He is acceptable risk from a cardiac standpoint to proceed with endoscopy.  2.  Coronary Artery Disease.  Status post PCI/drug-eluting stent placement to the mid RCA and angioplasty to the proximal RCA in November 2024.  Denies angina.  EKG ordered.  3.  Asymptomatic carotid artery stenosis.  4.  History of premature atrial contractions.  5.  Hypertension.  BP was elevated, but stable now.  6.  Hyperlipidemia.  7.  Mild aortic insufficiency on last echocardiogram.  8.  Further recommendations to follow after endoscopy is completed.  If perhaps, he is discharged today.  He needs to follow-up with KARIS Richards in the office in 1 week.    Addendum-EKG obtained and shows sinus arrhythmia entheses not new).    Thank you for allowing me to participate in the care of Jed Correia Sr.. Feel free to contact me directly with any further questions or concerns.    KARIS Hudson  [facility]  01/03/25  10:56 EST

## 2025-01-03 NOTE — ANESTHESIA POSTPROCEDURE EVALUATION
Patient: Jed Correia Sr.    Procedure Summary       Date: 01/03/25 Room / [facility]:  [facility]    Anesthesia Start: 1429 Anesthesia Stop: 1530    Procedure: ESOPHAGOGASTRODUODENOSCOPY (Esophagus) Diagnosis:       Melena      (Melena [K92.1])    Surgeons: Rina Grant MD Provider: Mary Jorgensen MD    Anesthesia Type: MAC ASA Status: 3            Anesthesia Type: MAC    Vitals  Vitals Value Taken Time   /55 01/03/25 1502   Temp     Pulse 68 01/03/25 1506   Resp 13 01/03/25 1501   SpO2 98 % 01/03/25 1506   Vitals shown include unfiled device data.        Post Anesthesia Care and Evaluation    Patient location during evaluation: bedside  Patient participation: complete - patient participated  Level of consciousness: awake and alert  Pain management: adequate    Airway patency: patent  Anesthetic complications: No anesthetic complications  PONV Status: controlled  Cardiovascular status: acceptable  Respiratory status: acceptable  Hydration status: acceptable

## 2025-01-04 ENCOUNTER — ANESTHESIA EVENT (OUTPATIENT)
Dept: GASTROENTEROLOGY | Facility: HOSPITAL | Age: 80
End: 2025-01-04
Payer: MEDICARE

## 2025-01-04 ENCOUNTER — ANESTHESIA (OUTPATIENT)
Dept: GASTROENTEROLOGY | Facility: HOSPITAL | Age: 80
End: 2025-01-04
Payer: MEDICARE

## 2025-01-04 VITALS
HEART RATE: 76 BPM | SYSTOLIC BLOOD PRESSURE: 160 MMHG | TEMPERATURE: 97.2 F | HEIGHT: 69 IN | RESPIRATION RATE: 18 BRPM | WEIGHT: 206 LBS | DIASTOLIC BLOOD PRESSURE: 76 MMHG | OXYGEN SATURATION: 97 % | BODY MASS INDEX: 30.51 KG/M2

## 2025-01-04 LAB
ANION GAP SERPL CALCULATED.3IONS-SCNC: 13.6 MMOL/L (ref 5–15)
BASOPHILS # BLD AUTO: 0.07 10*3/MM3 (ref 0–0.2)
BASOPHILS NFR BLD AUTO: 0.6 % (ref 0–1.5)
BUN SERPL-MCNC: 10 MG/DL (ref 8–23)
BUN/CREAT SERPL: 13.7 (ref 7–25)
CALCIUM SPEC-SCNC: 9.3 MG/DL (ref 8.6–10.5)
CHLORIDE SERPL-SCNC: 104 MMOL/L (ref 98–107)
CO2 SERPL-SCNC: 21.4 MMOL/L (ref 22–29)
CREAT SERPL-MCNC: 0.73 MG/DL (ref 0.76–1.27)
DEPRECATED RDW RBC AUTO: 39.2 FL (ref 37–54)
EGFRCR SERPLBLD CKD-EPI 2021: 92 ML/MIN/1.73
EOSINOPHIL # BLD AUTO: 0.27 10*3/MM3 (ref 0–0.4)
EOSINOPHIL NFR BLD AUTO: 2.4 % (ref 0.3–6.2)
ERYTHROCYTE [DISTWIDTH] IN BLOOD BY AUTOMATED COUNT: 11.4 % (ref 12.3–15.4)
GLUCOSE SERPL-MCNC: 136 MG/DL (ref 65–99)
HCT VFR BLD AUTO: 44.4 % (ref 37.5–51)
HGB BLD-MCNC: 14.8 G/DL (ref 13–17.7)
IMM GRANULOCYTES # BLD AUTO: 0.04 10*3/MM3 (ref 0–0.05)
IMM GRANULOCYTES NFR BLD AUTO: 0.4 % (ref 0–0.5)
LYMPHOCYTES # BLD AUTO: 1.79 10*3/MM3 (ref 0.7–3.1)
LYMPHOCYTES NFR BLD AUTO: 16.1 % (ref 19.6–45.3)
MCH RBC QN AUTO: 31.3 PG (ref 26.6–33)
MCHC RBC AUTO-ENTMCNC: 33.3 G/DL (ref 31.5–35.7)
MCV RBC AUTO: 93.9 FL (ref 79–97)
MONOCYTES # BLD AUTO: 1.07 10*3/MM3 (ref 0.1–0.9)
MONOCYTES NFR BLD AUTO: 9.6 % (ref 5–12)
NEUTROPHILS NFR BLD AUTO: 7.9 10*3/MM3 (ref 1.7–7)
NEUTROPHILS NFR BLD AUTO: 70.9 % (ref 42.7–76)
NRBC BLD AUTO-RTO: 0 /100 WBC (ref 0–0.2)
PLATELET # BLD AUTO: 257 10*3/MM3 (ref 140–450)
PMV BLD AUTO: 10.1 FL (ref 6–12)
POTASSIUM SERPL-SCNC: 3.8 MMOL/L (ref 3.5–5.2)
RBC # BLD AUTO: 4.73 10*6/MM3 (ref 4.14–5.8)
SODIUM SERPL-SCNC: 139 MMOL/L (ref 136–145)
WBC NRBC COR # BLD AUTO: 11.14 10*3/MM3 (ref 3.4–10.8)

## 2025-01-04 PROCEDURE — 80048 BASIC METABOLIC PNL TOTAL CA: CPT | Performed by: INTERNAL MEDICINE

## 2025-01-04 PROCEDURE — 63710000001 VALSARTAN 160 MG TABLET: Performed by: INTERNAL MEDICINE

## 2025-01-04 PROCEDURE — G0378 HOSPITAL OBSERVATION PER HR: HCPCS

## 2025-01-04 PROCEDURE — A9270 NON-COVERED ITEM OR SERVICE: HCPCS | Performed by: INTERNAL MEDICINE

## 2025-01-04 PROCEDURE — 25810000003 LACTATED RINGERS PER 1000 ML: Performed by: STUDENT IN AN ORGANIZED HEALTH CARE EDUCATION/TRAINING PROGRAM

## 2025-01-04 PROCEDURE — 63710000001 ASPIRIN 81 MG TABLET DELAYED-RELEASE: Performed by: INTERNAL MEDICINE

## 2025-01-04 PROCEDURE — 45399 UNLISTED PROCEDURE COLON: CPT | Performed by: INTERNAL MEDICINE

## 2025-01-04 PROCEDURE — 63710000001 VITAMIN B-6 50 MG TABLET: Performed by: INTERNAL MEDICINE

## 2025-01-04 PROCEDURE — 99232 SBSQ HOSP IP/OBS MODERATE 35: CPT | Performed by: NURSE PRACTITIONER

## 2025-01-04 PROCEDURE — 63710000001 VITAMIN B-12 100 MCG TABLET: Performed by: INTERNAL MEDICINE

## 2025-01-04 PROCEDURE — 25810000003 SODIUM CHLORIDE 0.9 % SOLUTION: Performed by: INTERNAL MEDICINE

## 2025-01-04 PROCEDURE — 85025 COMPLETE CBC W/AUTO DIFF WBC: CPT | Performed by: INTERNAL MEDICINE

## 2025-01-04 PROCEDURE — 25010000002 LIDOCAINE 2% SOLUTION: Performed by: STUDENT IN AN ORGANIZED HEALTH CARE EDUCATION/TRAINING PROGRAM

## 2025-01-04 PROCEDURE — 63710000001 VENLAFAXINE 75 MG TABLET: Performed by: INTERNAL MEDICINE

## 2025-01-04 PROCEDURE — 25010000002 PROPOFOL 10 MG/ML EMULSION: Performed by: STUDENT IN AN ORGANIZED HEALTH CARE EDUCATION/TRAINING PROGRAM

## 2025-01-04 PROCEDURE — 63710000001 FOLIC ACID 1 MG TABLET: Performed by: INTERNAL MEDICINE

## 2025-01-04 PROCEDURE — 63710000001 METOPROLOL SUCCINATE XL 25 MG TABLET SUSTAINED-RELEASE 24 HOUR: Performed by: INTERNAL MEDICINE

## 2025-01-04 PROCEDURE — 94799 UNLISTED PULMONARY SVC/PX: CPT

## 2025-01-04 PROCEDURE — 99999 PR OFFICE/OUTPT VISIT,PROCEDURE ONLY: CPT | Performed by: INTERNAL MEDICINE

## 2025-01-04 PROCEDURE — 63710000001 AMLODIPINE 2.5 MG TABLET: Performed by: INTERNAL MEDICINE

## 2025-01-04 DEVICE — WORKING LENGTH 235CM, WORKING CHANNEL 2.8MM
Type: IMPLANTABLE DEVICE | Site: DESCENDING COLON | Status: FUNCTIONAL
Brand: RESOLUTION 360 CLIP

## 2025-01-04 RX ORDER — SODIUM CHLORIDE 9 MG/ML
50 INJECTION, SOLUTION INTRAVENOUS ONCE
Status: COMPLETED | OUTPATIENT
Start: 2025-01-04 | End: 2025-01-04

## 2025-01-04 RX ORDER — SODIUM CHLORIDE, SODIUM LACTATE, POTASSIUM CHLORIDE, CALCIUM CHLORIDE 600; 310; 30; 20 MG/100ML; MG/100ML; MG/100ML; MG/100ML
INJECTION, SOLUTION INTRAVENOUS CONTINUOUS PRN
Status: DISCONTINUED | OUTPATIENT
Start: 2025-01-04 | End: 2025-01-04 | Stop reason: SURG

## 2025-01-04 RX ORDER — LIDOCAINE HYDROCHLORIDE 20 MG/ML
INJECTION, SOLUTION INFILTRATION; PERINEURAL AS NEEDED
Status: DISCONTINUED | OUTPATIENT
Start: 2025-01-04 | End: 2025-01-04 | Stop reason: SURG

## 2025-01-04 RX ORDER — PROPOFOL 10 MG/ML
VIAL (ML) INTRAVENOUS AS NEEDED
Status: DISCONTINUED | OUTPATIENT
Start: 2025-01-04 | End: 2025-01-04 | Stop reason: SURG

## 2025-01-04 RX ADMIN — PROPOFOL 140 MCG/KG/MIN: 10 INJECTION, EMULSION INTRAVENOUS at 11:06

## 2025-01-04 RX ADMIN — SODIUM CHLORIDE, POTASSIUM CHLORIDE, SODIUM LACTATE AND CALCIUM CHLORIDE: 600; 310; 30; 20 INJECTION, SOLUTION INTRAVENOUS at 11:01

## 2025-01-04 RX ADMIN — BUDESONIDE AND FORMOTEROL FUMARATE DIHYDRATE 1 PUFF: 160; 4.5 AEROSOL RESPIRATORY (INHALATION) at 08:23

## 2025-01-04 RX ADMIN — VENLAFAXINE HYDROCHLORIDE 75 MG: 75 TABLET ORAL at 12:19

## 2025-01-04 RX ADMIN — Medication 50 MG: at 08:26

## 2025-01-04 RX ADMIN — POLYETHYLENE GLYCOL 3350 0.5 BOTTLE: 17 POWDER, FOR SOLUTION ORAL at 04:25

## 2025-01-04 RX ADMIN — Medication 50 MCG: at 08:26

## 2025-01-04 RX ADMIN — ASPIRIN 81 MG: 81 TABLET, COATED ORAL at 08:25

## 2025-01-04 RX ADMIN — PROPOFOL 100 MG: 10 INJECTION, EMULSION INTRAVENOUS at 11:06

## 2025-01-04 RX ADMIN — AMLODIPINE BESYLATE 5 MG: 2.5 TABLET ORAL at 08:25

## 2025-01-04 RX ADMIN — METOPROLOL SUCCINATE 12.5 MG: 25 TABLET, EXTENDED RELEASE ORAL at 08:26

## 2025-01-04 RX ADMIN — FOLIC ACID 1 MG: 1 TABLET ORAL at 08:26

## 2025-01-04 RX ADMIN — Medication 10 ML: at 08:26

## 2025-01-04 RX ADMIN — SODIUM CHLORIDE 50 ML/HR: 9 INJECTION, SOLUTION INTRAVENOUS at 10:34

## 2025-01-04 RX ADMIN — LIDOCAINE HYDROCHLORIDE 40 MG: 20 INJECTION, SOLUTION INFILTRATION; PERINEURAL at 11:06

## 2025-01-04 RX ADMIN — VALSARTAN 320 MG: 320 TABLET, FILM COATED ORAL at 08:26

## 2025-01-04 NOTE — PROGRESS NOTES
Name: Jed Correia Sr. ADMIT: 2025   : 1945  PCP: Myke Landaverde MD    MRN: [id number] LOS: 0 days   AGE/SEX: 79 y.o. male  ROOM: [facility]/     Subjective   Subjective   No acute events. Patient denies new complaints. Had some melena with the bowel prep but this cleared up. No nausea or vomiting. Denies chest pain and dyspnea. No family at bedside.     Objective   Objective   Vital Signs  Temp:  [97.7 °F (36.5 °C)-98 °F (36.7 °C)] 97.7 °F (36.5 °C)  Heart Rate:  [60-78] 75  Resp:  [13-20] 18  BP: (105-178)/(49-79) 162/74  SpO2:  [93 %-100 %] 96 %  on  Flow (L/min) (Oxygen Therapy):  [10] 10;   Device (Oxygen Therapy): room air  Body mass index is 30.42 kg/m².  Physical Exam  Vitals and nursing note reviewed.   Constitutional:       General: He is not in acute distress.     Appearance: He is not toxic-appearing or diaphoretic.   HENT:      Head: Normocephalic and atraumatic.      Nose: Nose normal.      Mouth/Throat:      Mouth: Mucous membranes are moist.      Pharynx: Oropharynx is clear.   Eyes:      Conjunctiva/sclera: Conjunctivae normal.      Pupils: Pupils are equal, round, and reactive to light.   Cardiovascular:      Rate and Rhythm: Normal rate and regular rhythm.      Pulses: Normal pulses.   Pulmonary:      Effort: Pulmonary effort is normal.      Breath sounds: Normal breath sounds.   Abdominal:      General: Bowel sounds are normal. There is no distension.      Palpations: Abdomen is soft.      Tenderness: There is no abdominal tenderness.   Musculoskeletal:         General: No swelling or tenderness.      Cervical back: Neck supple.   Skin:     General: Skin is warm and dry.      Capillary Refill: Capillary refill takes less than 2 seconds.   Neurological:      General: No focal deficit present.      Mental Status: He is alert and oriented to person, place, and time.   Psychiatric:         Mood and Affect: Mood normal.         Behavior: Behavior normal.       Results Review     I  "reviewed the patient's new clinical results.  Results from last 7 days   Lab Units 01/04/25  0510 01/03/25  1711 01/03/25  0607 01/02/25  1416   WBC 10*3/mm3 11.14*  --  9.60 8.73   HEMOGLOBIN g/dL 14.8 14.2 12.9* 13.4   PLATELETS 10*3/mm3 257  --  232 252     Results from last 7 days   Lab Units 01/04/25  0509 01/02/25  1416   SODIUM mmol/L 139 139   POTASSIUM mmol/L 3.8 4.0   CHLORIDE mmol/L 104 105   CO2 mmol/L 21.4* 23.6   BUN mg/dL 10 15   CREATININE mg/dL 0.73* 0.87   GLUCOSE mg/dL 136* 122*   EGFR mL/min/1.73 92.0 87.2     Results from last 7 days   Lab Units 01/02/25  1416   ALBUMIN g/dL 4.4   BILIRUBIN mg/dL 0.8   ALK PHOS U/L 70   AST (SGOT) U/L 27   ALT (SGPT) U/L 29     Results from last 7 days   Lab Units 01/04/25  0509 01/02/25  1416   CALCIUM mg/dL 9.3 9.0   ALBUMIN g/dL  --  4.4     Results from last 7 days   Lab Units 01/02/25  1416   LACTATE mmol/L 1.5     No results found for: \"HGBA1C\", \"POCGLU\"    No radiology results for the last day    I have personally reviewed all medications:  Scheduled Medications  amLODIPine, 5 mg, Oral, Daily  aspirin, 81 mg, Oral, Daily  atorvastatin, 20 mg, Oral, Nightly  budesonide-formoterol, 1 puff, Inhalation, BID - RT  folic acid, 1 mg, Oral, Daily  metoprolol succinate XL, 12.5 mg, Oral, Q24H  sodium chloride, 10 mL, Intravenous, Q12H  valsartan, 320 mg, Oral, Daily  venlafaxine, 75 mg, Oral, Q24H  vitamin B-12, 50 mcg, Oral, Daily  vitamin B-6, 50 mg, Oral, Daily    Infusions     Diet  NPO Diet NPO Type: Strict NPO    I have personally reviewed:  [x]  Laboratory   []  Microbiology   []  Radiology   [x]  EKG/Telemetry  []  Cardiology/Vascular   []  Pathology    []  Records    Assessment/Plan     Active Hospital Problems    Diagnosis  POA    **GIB (gastrointestinal bleeding) [K92.2]  Yes    Melena [K92.1]  Unknown    HTN (hypertension) [I10]  Unknown    CAD (coronary artery disease) [I25.10]  Unknown    Asthma [J45.909]  Unknown    Gastrointestinal hemorrhage " [K92.2]  Yes      Resolved Hospital Problems   No resolved problems to display.   GI Bleeding with Acute Posthemorrhagic Anemia  - no further bleeding since arrival, hemodynamically stable  - EGD 1/3/25 was normal, colonoscopy planned for today  - Hemoglobin is normal today  - iron stores, B12, FA adequate  - complicated by CAD with recent RCA stenting-plavix held, ASA resumed  - appreciate GI recommendations    HTN/CAD  - BP acceptable, no anginal symptoms, had RCA stent in November 2024  - continue on lipitor, norvasc, metoprolol, and valsartan  - holding plavix as above-ASA resumed following EGD on 1/3/25  - appreciate cardiology recommendations      SCDs for DVT prophylaxis.  Full code.  Discussed with patient and nursing staff.  Anticipate discharge home when cleared by consultants.  Expected discharge date/ time has not been documented.      Myke Cardona MD  [facility]  01/04/25  09:59 EST    Portions of this text have been copied and I have reviewed them. They are accurate as of 1/4/2025

## 2025-01-04 NOTE — ANESTHESIA PREPROCEDURE EVALUATION
Anesthesia Evaluation     Patient summary reviewed and Nursing notes reviewed   NPO Solid Status: > 8 hours  NPO Liquid Status: > 2 hours           Airway   Mallampati: II  TM distance: >3 FB  Neck ROM: full  Dental - normal exam     Pulmonary    (+) a smoker Former, asthma,sleep apnea (appliance)  Cardiovascular   Exercise tolerance: good (4-7 METS)    ECG reviewed  PT is on anticoagulation therapy  Patient on routine beta blocker and Beta blocker given within 24 hours of surgery    (+) hypertension, CAD, cardiac stents Drug eluting stent within the past 12 months , hyperlipidemia  (-) angina      Neuro/Psych  GI/Hepatic/Renal/Endo    (+) GI bleeding     Musculoskeletal     Abdominal    Substance History   (+) alcohol use     OB/GYN          Other   arthritis,   history of cancer                  Anesthesia Plan    ASA 3     MAC   total IV anesthesia  (I have reviewed the patient's history with the patient and the chart, including all pertinent laboratory results and imaging. I have explained the risks of anesthesia including but not limited to dental damage, corneal abrasion, nerve injury, MI, stroke, and death. Questions asked and answered. Anesthetic plan discussed with patient and team as indicated. Patient expressed understanding of the above.  )    Anesthetic plan, risks, benefits, and alternatives have been provided, discussed and informed consent has been obtained with: patient.      CODE STATUS:    Level Of Support Discussed With: Patient  Code Status (Patient has no pulse and is not breathing): CPR (Attempt to Resuscitate)  Medical Interventions (Patient has pulse or is breathing): Full Support

## 2025-01-04 NOTE — CASE MANAGEMENT/SOCIAL WORK
Case Management Discharge Note      Final Note: dc home         Selected Continued Care - Discharged on 1/4/2025 Admission date: 1/2/2025 - Discharge disposition: Home or Self Care      Destination    No services have been selected for the patient.                Durable Medical Equipment    No services have been selected for the patient.                Dialysis/Infusion    No services have been selected for the patient.                Home Medical Care    No services have been selected for the patient.                Therapy    No services have been selected for the patient.                Community Resources    No services have been selected for the patient.                Community & DME    No services have been selected for the patient.                         Final Discharge Disposition Code: 01 - home or self-care

## 2025-01-04 NOTE — PROGRESS NOTES
HOSPITAL PROGRESS NOTE    Date of Service: 25  LOS:  LOS: 0 days   Patient Name: Jed Correia Sr.  Age/Sex: 79 y.o. male  : 1945  MRN: [id number]  Primary Cardiologist: Dr. Jed Moody    Subjective:     Chief Complaint/Follow up:   GI bleed    Interval History:   Resting in bed.  Multiple bowel movements today that were still black in color.  Denies chest pain, shortness of breath, or palpitations.  Ready to go home.    Objective:     Objective:  Temp:  [97.7 °F (36.5 °C)-97.9 °F (36.6 °C)] 97.7 °F (36.5 °C)  Heart Rate:  [60-78] 64  Resp:  [13-18] 16  BP: ()/(44-89) 151/74  Body mass index is 30.42 kg/m².    Intake/Output Summary (Last 24 hours) at 2025 1418  Last data filed at 2025 1144  Gross per 24 hour   Intake 470 ml   Output --   Net 470 ml         25  2215   Weight: 93.4 kg (206 lb)       Physical Exam:   General Appearance: Alert, cooperative, in no acute distress. AAOx4.   Neck: No JVD.  Lungs: Clear. Normal respiratory effort and rate.  Heart: Regular rate and rhythm, normal S1 and S2, no murmurs, gallops or rubs.  Extremities: No edema.     Lab Review:   Results from last 7 days   Lab Units 25  0509 25  1416   SODIUM mmol/L 139 139   POTASSIUM mmol/L 3.8 4.0   CHLORIDE mmol/L 104 105   CO2 mmol/L 21.4* 23.6   BUN mg/dL 10 15   CREATININE mg/dL 0.73* 0.87   GLUCOSE mg/dL 136* 122*   CALCIUM mg/dL 9.3 9.0   AST (SGOT) U/L  --  27   ALT (SGPT) U/L  --  29         Results from last 7 days   Lab Units 25  0510 25  1711 25  0607   WBC 10*3/mm3 11.14*  --  9.60   HEMOGLOBIN g/dL 14.8 14.2 12.9*   HEMATOCRIT % 44.4  --  40.3   PLATELETS 10*3/mm3 257  --  232                           Results for orders placed during the hospital encounter of 23    Adult Transthoracic Echo Complete w/ Color, Spectral and Contrast if Necessary Per Protocol    Interpretation Summary    Left ventricular systolic function is normal. Calculated left  ventricular EF = 65.7% Normal left ventricular cavity size noted. Left ventricular wall thickness is consistent with mild concentric hypertrophy. All left ventricular wall segments contract normally. Left ventricular diastolic function is consistent with (grade I) impaired relaxation.    There is moderate calcification of the aortic valve. Mild aortic valve regurgitation is present. No aortic valve stenosis is present.    Mild mitral valve regurgitation is present.    Trace tricuspid valve regurgitation is present. Estimated right ventricular systolic pressure from tricuspid regurgitation is normal (<35 mmHg). Calculated right ventricular systolic pressure from tricuspid regurgitation is 15 mmHg.    I reviewed the patient's new clinical results.  I personally viewed and interpreted the patient's EKG/Telemetry data/Labs/Test Results.     Current Medications:   Scheduled Meds:  amLODIPine, 5 mg, Oral, Daily  aspirin, 81 mg, Oral, Daily  atorvastatin, 20 mg, Oral, Nightly  budesonide-formoterol, 1 puff, Inhalation, BID - RT  folic acid, 1 mg, Oral, Daily  metoprolol succinate XL, 12.5 mg, Oral, Q24H  sodium chloride, 10 mL, Intravenous, Q12H  valsartan, 320 mg, Oral, Daily  venlafaxine, 75 mg, Oral, Q24H  vitamin B-12, 50 mcg, Oral, Daily  vitamin B-6, 50 mg, Oral, Daily         Allergies:  Allergies   Allergen Reactions    Bacitracin-Polymyxin B Unknown - Low Severity    Iodine Rash    Latex Rash    Neomycin-Bacitracin Zn-Polymyx Unknown - Low Severity    Povidone-Iodine Rash       Assessment:       GIB (gastrointestinal bleeding)    HTN (hypertension)    CAD (coronary artery disease)    Asthma    Gastrointestinal hemorrhage    Melena        Plan:   Assessment & Plan       1.  Acute GI bleed of unknown etiology.  EGD unremarkable and colonoscopy showed a colon polyp.    2.  Coronary Artery Disease.  Status post PCI/drug-eluting stent placement to the mid RCA and angioplasty to the proximal RCA in November 2024.   "Denies angina.    3.  Asymptomatic carotid artery stenosis.  4.  History of premature atrial contractions.  5.  Hypertension.  BP was elevated, but stable now.  6.  Hyperlipidemia.  7.  Mild aortic insufficiency on last echocardiogram.    Plan  -Yesterday I discussed the plan of care with Dr. Reji Carranza.  He recommended continuing clopidogrel and stopping aspirin.  -After that, Dr. Grant put in her note that she spoke with \"cardiology\" and patient was to stop clopidogrel and continue aspirin.  -I just spoke with Dr. Cardona via phone.  Patient still having black stools.  We decided to send patient home on clopidogrel and hold aspirin.  I will discuss with Dr. Moody next week.   -Patient will need to see KARIS Richards in 1 week.    KARIS Hudson  [facility]   01/04/25  14:18 EST       "

## 2025-01-04 NOTE — ANESTHESIA POSTPROCEDURE EVALUATION
"Patient: Jed Correia Sr.    Procedure Summary       Date: 01/04/25 Room / Location: [facility] ENDOSCOPY 7 /  [facility]    Anesthesia Start: 1101 Anesthesia Stop: 1130    Procedure: COLONOSCOPY TO CECUM AND TERMINAL ILEUM Diagnosis:       Melena      (Melena [K92.1])    Surgeons: Shadi Cabral MD Provider: Tito Correia MD    Anesthesia Type: MAC ASA Status: 3            Anesthesia Type: MAC    Vitals  Vitals Value Taken Time   BP 89/44 01/04/25 1127   Temp     Pulse 73 01/04/25 1127   Resp 16 01/04/25 1127   SpO2 92 % 01/04/25 1127           Post Anesthesia Care and Evaluation    Patient location during evaluation: PACU  Patient participation: complete - patient participated  Level of consciousness: awake and alert  Pain management: adequate    Airway patency: patent  Anesthetic complications: No anesthetic complications  PONV Status: controlled  Cardiovascular status: acceptable and hemodynamically stable  Respiratory status: acceptable  Hydration status: acceptable    Comments: BP (!) 89/44   Pulse 73   Temp 36.5 °C (97.7 °F) (Oral)   Resp 16   Ht 175.3 cm (69\")   Wt 93.4 kg (206 lb)   SpO2 92%   BMI 30.42 kg/m²     "

## 2025-01-04 NOTE — DISCHARGE SUMMARY
Date of Admission: 1/2/2025  Date of Discharge:  1/4/2025  Primary Care Physician: Myke Landaverde MD     Discharge Diagnosis:  Active Hospital Problems    Diagnosis  POA    **GIB (gastrointestinal bleeding) [K92.2]  Yes    Melena [K92.1]  Yes    HTN (hypertension) [I10]  Yes    CAD (coronary artery disease) [I25.10]  Yes    Asthma [J45.909]  Yes    Gastrointestinal hemorrhage [K92.2]  Yes      Resolved Hospital Problems   No resolved problems to display.       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 male with a history of HTN and CAD with recent RCA stenting in November 2024 on ASA and plavix who presented with melena. Please see admission H&P for further details. He did not have evidence of further bleeding on admission and his hemoglobin remained stable. His aspirin and plavix were held. He was followed by cardiology and gastroenterology and he underwent an EGD and colonoscopy which were normal aside from a colon polyp. Given his stability of hemoglobin without evidence of further bleeding and no definite causative lesion on endoscopies it was recommended that he resume his plavix. I discussed with cardiology and they recommend holding aspirin for now. I d/w KARIS Prado and she is going to discuss with Dr. Moody on Tuesday regarding any potential changes to this regimen. He can follow up with gastroenterology for colonoscopy and removal of the polyp in another 2-3 months. He should keep cardiology follow up as scheduled. He is medically stable and will be discharged home. He should follow up with his PCP in 5 days for repeat H&H.     Exam Today:  Blood pressure 151/74, pulse 64, temperature 97.7 °F (36.5 °C),  "temperature source Oral, resp. rate 16, height 175.3 cm (69\"), weight 93.4 kg (206 lb), SpO2 97%.  Vitals and nursing note reviewed.   Constitutional:       General: He is not in acute distress.     Appearance: He is not toxic-appearing or diaphoretic.   HENT:      Head: Normocephalic and atraumatic.      Nose: Nose normal.      Mouth/Throat:      Mouth: Mucous membranes are moist.      Pharynx: Oropharynx is clear.   Eyes:      Conjunctiva/sclera: Conjunctivae normal.      Pupils: Pupils are equal, round, and reactive to light.   Cardiovascular:      Rate and Rhythm: Normal rate and regular rhythm.      Pulses: Normal pulses.   Pulmonary:      Effort: Pulmonary effort is normal.      Breath sounds: Normal breath sounds.   Abdominal:      General: Bowel sounds are normal. There is no distension.      Palpations: Abdomen is soft.      Tenderness: There is no abdominal tenderness.   Musculoskeletal:         General: No swelling or tenderness.      Cervical back: Neck supple.   Skin:     General: Skin is warm and dry.      Capillary Refill: Capillary refill takes less than 2 seconds.   Neurological:      General: No focal deficit present.      Mental Status: He is alert and oriented to person, place, and time.   Psychiatric:         Mood and Affect: Mood normal.         Behavior: Behavior normal.     Procedures Performed:  Procedure(s):  COLONOSCOPY TO CECUM AND TERMINAL ILEUM  01/04 1031 Colonoscopy  01/03 1427 Upper GI Endoscopy    Consults:   Consults       Date and Time Order Name Status Description    1/2/2025  8:11 PM Inpatient Cardiology Consult Completed     1/2/2025  7:52 PM Inpatient Gastroenterology Consult Completed     1/2/2025  6:19 PM [facility] (on-call MD unless specified) Details               Discharge Disposition:  Home or Self Care    Discharge Medications:     Discharge Medications        Continue These Medications        Instructions Start Date   amLODIPine 5 MG tablet  Commonly known as: NORVASC   5 " mg, Oral, Daily      atorvastatin 20 MG tablet  Commonly known as: LIPITOR   20 mg, Oral, Nightly      clopidogrel 75 MG tablet  Commonly known as: PLAVIX   75 mg, Oral, Daily      Co Q 10 10 MG capsule   1 tablet, Oral, Daily      desonide 0.05 % lotion  Commonly known as: DESOWEN   2 Times Daily      ezetimibe 10 MG tablet  Commonly known as: ZETIA   10 mg, Oral, Daily      fish oil 1000 MG capsule capsule   1,000 mg, Daily With Breakfast      Fluzone High-Dose Quadrivalent 0.7 ML suspension prefilled syringe injection  Generic drug: Influenza Vac High-Dose Quad   Intramuscular      folic acid 1 MG tablet  Commonly known as: FOLVITE   1 mg, Daily      glucosamine-chondroitin 500-400 MG capsule capsule   2 capsules, Daily      metoprolol succinate XL 25 MG 24 hr tablet  Commonly known as: TOPROL-XL   TAKE 1/2 TABLET BY MOUTH EVERY DAY, HOLD FOR HEART RATE LESS THAN 60 BPM      multivitamin with minerals tablet tablet   1 tablet, Daily      nitroglycerin 0.4 MG SL tablet  Commonly known as: NITROSTAT   0.4 mg, Sublingual, Every 5 Minutes PRN      pantoprazole 40 MG EC tablet  Commonly known as: PROTONIX   40 mg, Daily      PROBIOTIC DAILY PO   1 capsule, Daily      Symbicort 80-4.5 MCG/ACT inhaler  Generic drug: budesonide-formoterol   INHALE 2 PUFFS BY MOUTH TWICE DAILY. RINSE MOUTH WITH WATER AFTER USE FOR AFTERTASTE AND INCIDENCE OF CANDIDIASIS. DO NOT SWALLOW      valsartan 320 MG tablet  Commonly known as: DIOVAN   320 mg, Daily      venlafaxine 75 MG tablet  Commonly known as: EFFEXOR   Every 24 Hours      vitamin B-12 100 MCG tablet  Commonly known as: CYANOCOBALAMIN   50 mcg, Daily      vitamin B-6 50 MG tablet  Commonly known as: PYRIDOXINE   50 mg, Daily      Vitamin D-3 25 MCG (1000 UT) capsule   2,000 Units, Daily             Stop These Medications      aspirin 81 MG chewable tablet              Discharge Diet:   Diet Instructions       Diet: Regular/House Diet; Regular (IDDSI 7); Thin (IDDSI 0)       Discharge Diet: Regular/House Diet    Texture: Regular (IDDSI 7)    Fluid Consistency: Thin (IDDSI 0)            Activity at Discharge:   Activity Instructions       Activity as Tolerated              Follow-up Appointments:  Future Appointments   Date Time Provider Department Center   1/6/2025 10:00 AM TELEMETRY MONITOR - [facility]   1/8/2025 10:00 AM TELEMETRY MONITOR -  [facility]   1/10/2025 10:00 AM TELEMETRY MONITOR -  [facility]   1/13/2025 10:00 AM TELEMETRY MONITOR - [facility]   1/15/2025 10:00 AM TELEMETRY MONITOR - [facility]   1/17/2025 10:00 AM TELEMETRY MONITOR - [facility]   1/20/2025 10:00 AM TELEMETRY MONITOR -  [facility]   1/22/2025 10:00 AM TELEMETRY MONITOR -  [facility]  [facility]   1/24/2025 10:00 AM TELEMETRY MONITOR -  [facility]  [facility]   1/27/2025 10:00 AM TELEMETRY MONITOR -  [facility]  [facility]   1/29/2025 10:00 AM TELEMETRY MONITOR -  [facility]  [facility]   1/31/2025 10:00 AM TELEMETRY MONITOR -  [facility]  [facility]   2/3/2025 10:00 AM TELEMETRY MONITOR -  [facility]  [facility]   2/4/2025 10:00 AM [facility] OP VAS RM 1 [facility]   2/4/2025 10:30 AM James Karimi MD [facility]   2/5/2025 10:00 AM TELEMETRY MONITOR -  [facility]   2/7/2025 10:00 AM TELEMETRY MONITOR -  [facility]   2/10/2025 10:00 AM TELEMETRY MONITOR -  [facility]   2/12/2025 10:00 AM TELEMETRY MONITOR -  [facility]  [facility] CAR [facility]   2/14/2025 10:00 AM TELEMETRY MONITOR -  [facility]  [facility]   2/17/2025 10:00 AM TELEMETRY MONITOR -  [facility]  [facility]   2/19/2025 10:00 AM TELEMETRY MONITOR -  [facility]  [facility]   2/21/2025 10:00 AM TELEMETRY MONITOR -  [facility]  [facility] CAR [facility]   2/24/2025 10:00 AM TELEMETRY MONITOR -  [facility]  [facility] CAR [facility]   2/26/2025 10:00 AM TELEMETRY MONITOR -  [facility] C[facility]  [facility] CAR  [facility]   7/8/2025 10:45 AM Jed Moody Jr., MD [facility]   11/5/2025 10:00 AM Joleen Gregorio APRN K CD [facility]     Additional Instructions for the Follow-ups that You Need to Schedule       Discharge Follow-up with PCP   As directed       Currently Documented PCP:    Myke Landaverde MD    PCP Phone Number:    [phone]     Follow Up Details: 5 days        Discharge Follow-up with Specified Provider: Dr. Grant (Gastroenterology); 3 Months   As directed      To: Dr. Grant (Gastroenterology)   Follow Up: 3 Months                 Myke Cardona MD  01/04/25  14:08 EST    Time Spent on Discharge Activities: Greater than 30 minutes.

## 2025-01-04 NOTE — PLAN OF CARE
Goal Outcome Evaluation:           Progress: improving  Outcome Evaluation: PT AOx4. On RA. NSr on monitor. HAD cscope this am results in chart. MD discussed with son and pt to follow up in 2-3 months after plavix can be stopped to remove polyp. No s/s bleeding. HGB stable. D.c orders in place. Pt not in acute distress. Plan of care ongoing until d/c

## 2025-01-07 ENCOUNTER — TELEPHONE (OUTPATIENT)
Dept: CARDIOLOGY | Facility: CLINIC | Age: 80
End: 2025-01-07
Payer: MEDICARE

## 2025-01-07 NOTE — TELEPHONE ENCOUNTER
I informed Dr. Jed Moody that Mr. Correia was hospitalized over the weekend with a GI bleed.  Dr. Moody and agrees with the plan of care.  Continue clopidogrel and stop aspirin.    I just spoke with patient via phone and gave him Dr. Moody's recommendations.  He will follow-up in the office in 1 week.  He denies any further bleeding.    Scheduling will arrange appointment for next week.

## 2025-01-08 ENCOUNTER — APPOINTMENT (OUTPATIENT)
Dept: CARDIAC REHAB | Facility: HOSPITAL | Age: 80
End: 2025-01-08
Payer: MEDICARE

## 2025-01-08 RX ORDER — VALSARTAN 320 MG/1
320 TABLET ORAL DAILY
Qty: 90 TABLET | Refills: 3 | Status: SHIPPED | OUTPATIENT
Start: 2025-01-08

## 2025-01-10 ENCOUNTER — APPOINTMENT (OUTPATIENT)
Dept: CARDIAC REHAB | Facility: HOSPITAL | Age: 80
End: 2025-01-10
Payer: MEDICARE

## 2025-01-13 ENCOUNTER — TREATMENT (OUTPATIENT)
Dept: CARDIAC REHAB | Facility: HOSPITAL | Age: 80
End: 2025-01-13
Payer: MEDICARE

## 2025-01-13 DIAGNOSIS — Z95.5 S/P DRUG ELUTING CORONARY STENT PLACEMENT: Primary | ICD-10-CM

## 2025-01-13 PROCEDURE — 93798 PHYS/QHP OP CAR RHAB W/ECG: CPT

## 2025-01-14 ENCOUNTER — TELEPHONE (OUTPATIENT)
Dept: CARDIOLOGY | Facility: CLINIC | Age: 80
End: 2025-01-14

## 2025-01-14 ENCOUNTER — HOSPITAL ENCOUNTER (OUTPATIENT)
Dept: CARDIOLOGY | Facility: HOSPITAL | Age: 80
Discharge: HOME | End: 2025-01-14
Admitting: NURSE PRACTITIONER
Payer: MEDICARE

## 2025-01-14 ENCOUNTER — OFFICE VISIT (OUTPATIENT)
Dept: CARDIOLOGY | Facility: CLINIC | Age: 80
End: 2025-01-14
Payer: MEDICARE

## 2025-01-14 VITALS
DIASTOLIC BLOOD PRESSURE: 90 MMHG | BODY MASS INDEX: 31.1 KG/M2 | HEIGHT: 69 IN | OXYGEN SATURATION: 99 % | WEIGHT: 210 LBS | SYSTOLIC BLOOD PRESSURE: 150 MMHG | HEART RATE: 67 BPM

## 2025-01-14 DIAGNOSIS — K92.1 MELENA: Primary | ICD-10-CM

## 2025-01-14 DIAGNOSIS — K92.1 MELENA: ICD-10-CM

## 2025-01-14 DIAGNOSIS — Z95.5 S/P DRUG ELUTING CORONARY STENT PLACEMENT: ICD-10-CM

## 2025-01-14 DIAGNOSIS — I25.10 CORONARY ARTERY DISEASE INVOLVING NATIVE CORONARY ARTERY OF NATIVE HEART WITHOUT ANGINA PECTORIS: ICD-10-CM

## 2025-01-14 DIAGNOSIS — G47.39 POSITIONAL SLEEP APNEA: ICD-10-CM

## 2025-01-14 LAB
DEPRECATED RDW RBC AUTO: 40.2 FL (ref 37–54)
ERYTHROCYTE [DISTWIDTH] IN BLOOD BY AUTOMATED COUNT: 11.8 % (ref 12.3–15.4)
HCT VFR BLD AUTO: 38.7 % (ref 37.5–51)
HGB BLD-MCNC: 12.8 G/DL (ref 13–17.7)
MCH RBC QN AUTO: 31.2 PG (ref 26.6–33)
MCHC RBC AUTO-ENTMCNC: 33.1 G/DL (ref 31.5–35.7)
MCV RBC AUTO: 94.4 FL (ref 79–97)
PLATELET # BLD AUTO: 277 10*3/MM3 (ref 140–450)
PMV BLD AUTO: 9.7 FL (ref 6–12)
RBC # BLD AUTO: 4.1 10*6/MM3 (ref 4.14–5.8)
WBC NRBC COR # BLD AUTO: 8.97 10*3/MM3 (ref 3.4–10.8)

## 2025-01-14 PROCEDURE — 3080F DIAST BP >= 90 MM HG: CPT | Performed by: NURSE PRACTITIONER

## 2025-01-14 PROCEDURE — 1159F MED LIST DOCD IN RCRD: CPT | Performed by: NURSE PRACTITIONER

## 2025-01-14 PROCEDURE — 36415 COLL VENOUS BLD VENIPUNCTURE: CPT

## 2025-01-14 PROCEDURE — 3077F SYST BP >= 140 MM HG: CPT | Performed by: NURSE PRACTITIONER

## 2025-01-14 PROCEDURE — 1160F RVW MEDS BY RX/DR IN RCRD: CPT | Performed by: NURSE PRACTITIONER

## 2025-01-14 PROCEDURE — 85027 COMPLETE CBC AUTOMATED: CPT | Performed by: NURSE PRACTITIONER

## 2025-01-14 PROCEDURE — 99214 OFFICE O/P EST MOD 30 MIN: CPT | Performed by: NURSE PRACTITIONER

## 2025-01-14 NOTE — TELEPHONE ENCOUNTER
Spoke with patient and discussed stable hemoglobin at 12.8.  Will plan recheck between 2-3 weeks.  He verbalized understanding.

## 2025-01-14 NOTE — PROGRESS NOTES
Date of Office Visit: 25  Encounter Provider: KARIS Erickson  Place of Service: [facility]  Patient Name: Jed Correia Sr.  :1945    Chief Complaint   Patient presents with    Coronary artery disease involving native coronary artery of    Follow-up   :     HPI: Jed Correia Sr. is a 79 y.o. male  with hypertension, dyslipidemia, premature atrial contraction, coronary artery disease, carotid artery stenoses, aortic valve sclerosis and aortic valve insufficiency, GERD, obstructive sleep apnea, prostate cancer and gastric ulcer.  He had prior colon cancer resection .        He is a former smoker who quit in .  He has family history involving a brother who had myocardial infarction followed by CABG.        He is followed by Dr. Jed Moody. I will visit with him in follow up and have reviewed his medical record.      He was previously followed by [facility] and had drug-eluting stent placement 2023 to the proximal and mid RCA with overlapping Xience stents.  he had a Holter monitor in 2022 which showed 15% premature atrial contraction but no sustained arrhythmia.     He had mild to moderate carotid artery stenosis on last duplex 10/2022.  He then had cardiac murmur appreciated 2023 and had echocardiogram 2023 which showed normal left ventricular systolic function, mild concentric hypertrophy, grade 1 diastolic dysfunction, moderately calcified aortic valve with mild aortic valve regurgitation and normal RVSP.    He was started on losartan for elevated blood pressure and renal function was stable 2024        On 10/30/2024 he reported unusual exertional dyspnea.  He had woke up at night with shortness of breath and heart racing and then had increased fatigue and higher blood pressure readings with dyspnea on exertion.  Perfusion stress test was ordered and scheduled for 2024 but then on 2024  "patient called with increased dyspnea on exertion felt to be concerning for anginal equivalent.  He was set up for heart catheterization on 11/7/2024 and loaded with Plavix, heparin and full-strength aspirin.  He was found to have 90% mid RCA lesion which was stented and a proximal RCA lesion of 70% which responded to balloon angioplasty and was decreased to 20%.  He was referred to cardiac rehab.     He then was admitted in January 2025 with gastrointestinal hemorrhage.  It was recommended to stop aspirin and continue clopidogrel.  He had EGD which showed multiple gastric polyps without bleeding.  Colonoscopy showed a 10 mm polyp in the descending colon.  Polypectomy was not attempted due to patient taking blood thinner.  Nonbleeding internal hemorrhoids was seen.  Repeat colonoscopy once Plavix could be held longer was recommended for polypectomy.    He presents today for hospital discharge follow-up.  He continues to have melena although it is not as dark now that he is off aspirin.  He is taking clopidogrel daily.  He is wearing oral appliance for history of sleep apnea.  No chest pain or shortness of breath or edema dizziness or palpitation.  He is back in cardiac rehab and his vital signs are being monitored closely there.        Allergies   Allergen Reactions    Bacitracin-Polymyxin B Unknown - Low Severity    Iodine Rash    Latex Rash    Neomycin-Bacitracin Zn-Polymyx Unknown - Low Severity    Povidone-Iodine Rash           Family and social history reviewed.     ROS  All other systems were reviewed and are negative          Objective:     Vitals:    01/14/25 0945   BP: 150/90   BP Location: Left arm   Patient Position: Sitting   Cuff Size: Adult   Pulse: 67   SpO2: 99%   Weight: 95.3 kg (210 lb)   Height: 175.3 cm (69\")     Body mass index is 31.01 kg/m².    PHYSICAL EXAM:  Pulmonary:      Effort: Pulmonary effort is normal.      Breath sounds: Normal breath sounds.   Cardiovascular:      Normal rate. " Regular rhythm.      Murmurs: There is a grade 1/6 systolic murmur.         Procedures      Current Outpatient Medications   Medication Sig Dispense Refill    amLODIPine (NORVASC) 5 MG tablet Take 1 tablet by mouth Daily. 30 tablet 11    atorvastatin (LIPITOR) 20 MG tablet Take 1 tablet by mouth Every Night. 90 tablet 3    Cholecalciferol (VITAMIN D-3) 1000 UNITS capsule Take 2,000 Units by mouth Daily.      clopidogrel (PLAVIX) 75 MG tablet TAKE 1 TABLET BY MOUTH DAILY 90 tablet 3    Coenzyme Q10 (Co Q 10) 10 MG capsule Take 1 tablet by mouth Daily. 90 each 3    desonide (DESOWEN) 0.05 % lotion Apply 1 application topically to the appropriate area as directed 2 (Two) Times a Day.      ezetimibe (ZETIA) 10 MG tablet TAKE 1 TABLET BY MOUTH DAILY 90 tablet 3    folic acid (FOLVITE) 1 MG tablet Take 1 tablet by mouth Daily.      glucosamine-chondroitin 500-400 MG capsule capsule Take 2 capsules by mouth Daily.      Influenza Vac High-Dose Quad (Fluzone High-Dose Quadrivalent) 0.7 ML suspension prefilled syringe injection Inject  into the appropriate muscle as directed by prescriber. 0.7 mL 0    metoprolol succinate XL (TOPROL-XL) 25 MG 24 hr tablet TAKE 1/2 TABLET BY MOUTH EVERY DAY, HOLD FOR HEART RATE LESS THAN 60 BPM 90 tablet 1    Multiple Vitamins-Minerals (CENTRUM SILVER ADULT 50+ PO) Take 1 tablet by mouth Daily.      nitroglycerin (NITROSTAT) 0.4 MG SL tablet Place 1 tablet under the tongue Every 5 (Five) Minutes As Needed for Chest Pain. 15 tablet 11    Omega-3 Fatty Acids (FISH OIL) 1000 MG capsule capsule Take 1 capsule by mouth Daily With Breakfast.      pantoprazole (PROTONIX) 40 MG EC tablet Take 1 tablet by mouth Daily.      Probiotic Product (PROBIOTIC DAILY PO) Take 1 capsule by mouth Daily.      Symbicort 80-4.5 MCG/ACT inhaler INHALE 2 PUFFS BY MOUTH TWICE DAILY. RINSE MOUTH WITH WATER AFTER USE FOR AFTERTASTE AND INCIDENCE OF CANDIDIASIS. DO NOT SWALLOW      valsartan (DIOVAN) 320 MG tablet TAKE 1  TABLET BY MOUTH DAILY 90 tablet 3    venlafaxine (EFFEXOR) 75 MG tablet Daily.      vitamin B-12 (CYANOCOBALAMIN) 100 MCG tablet Take 0.5 tablets by mouth Daily.      vitamin B-6 (PYRIDOXINE) 50 MG tablet Take 1 tablet by mouth Daily.       No current facility-administered medications for this visit.     Assessment:       Diagnosis Plan   1. Melena  CBC (No Diff)      2. Coronary artery disease involving native coronary artery of native heart without angina pectoris        3. S/P drug eluting coronary stent placement        4. Positional sleep apnea             Orders Placed This Encounter   Procedures    CBC (No Diff)     Standing Status:   Future     Number of Occurrences:   1     Standing Expiration Date:   1/14/2026     Order Specific Question:   Release to patient     Answer:   Routine Release [[id number]]         Plan:    1.  79-year-old gentleman with coronary artery disease and prior PCI of the right coronary artery in 02/2013.  He had unstable angina with dyspnea on exertion is his anginal equivalent and is now status post drug-eluting stent to the mid RCA and had balloon angioplasty of the proximal RCA 11/7/2024.  Dual antiplatelet therapy was interrupted due to GI bleed January 2025.  Patient is currently on clopidogrel monotherapy and no longer on aspirin.  2.  Hypertension-blood pressure is little elevated but being followed closely in cardiac rehab and normally lower than today.  3.  Dyslipidemia.  Target LDL 70 or less.  He is maintained on atorvastatin 20 mg daily along with ezetimibe 10 mg daily.  Target LDL less than 70.  His LDL was 63 in November  4.  Carotid artery stenoses with moderate stenoses in the right and mild stenoses on the left on last duplex 10/2022-he is scheduled to see vascular surgery in February with repeat imaging  5.  Premature atrial contraction, frequent at 15% on prior Holter monitor in 03/2022 @ [facility]  6.  Cardiac murmur with moderately calcified aortic valve and  mild aortic valve regurgitation on echo 11/2023  7.  Former smoker who quit in 1978  8.  GERD  9.  History of gastric ulcer  10.  Colon cancer status post resection 1986  11.  Prostate cancer that is post prostatectomy 01/2011  12.  History of dyspnea on exertion.  He had pulmonary function testing in November 2024, we will request copy to review and scan since this is not visible in care everywhere   13.  Obstructive sleep apnea-he has oral appliance and reports compliance.  14.former smoker who quit in 1978.   15.  GI bleed January 2025 with stable hemoglobin. .  Colonoscopy earlier this month showed 10 mm polyp which was not removed.  Given his continued dark stool,  I will recheck CBC today to evaluate hemoglobin status. I have reviewed with Dr. Jed Moody and we would give permission to hold clopidogrel after a full 3 months of therapy.  His last cardiac stent was placed in November 2024.  Patient may have repeat colonoscopy and may hold clopidogrel for 3-5 days anytime starting in March.  We prefer the patient is off clopidogrel for as little as possible.  I sent a secure message to Dr. Ki Ash with gastroenterology as well.      Patient was advised to keep his appointment in July as scheduled            It has been a pleasure to participate in this patient's care.      Thank you,  KARIS Erickson      **I used Dragon to dictate this note:**

## 2025-01-15 ENCOUNTER — TREATMENT (OUTPATIENT)
Dept: CARDIAC REHAB | Facility: HOSPITAL | Age: 80
End: 2025-01-15
Payer: MEDICARE

## 2025-01-15 DIAGNOSIS — Z95.5 S/P DRUG ELUTING CORONARY STENT PLACEMENT: Primary | ICD-10-CM

## 2025-01-15 PROCEDURE — 93798 PHYS/QHP OP CAR RHAB W/ECG: CPT

## 2025-01-15 NOTE — TELEPHONE ENCOUNTER
Reviewed results showing mild obstructive disease with significant bronchodilator response, could be indicative of asthma.  Will scan

## 2025-01-17 ENCOUNTER — TREATMENT (OUTPATIENT)
Dept: CARDIAC REHAB | Facility: HOSPITAL | Age: 80
End: 2025-01-17
Payer: MEDICARE

## 2025-01-17 ENCOUNTER — PREP FOR SURGERY (OUTPATIENT)
Dept: SURGERY | Facility: SURGERY CENTER | Age: 80
End: 2025-01-17
Payer: MEDICARE

## 2025-01-17 DIAGNOSIS — K63.5 POLYP OF COLON, UNSPECIFIED PART OF COLON, UNSPECIFIED TYPE: Primary | ICD-10-CM

## 2025-01-17 DIAGNOSIS — Z95.5 S/P DRUG ELUTING CORONARY STENT PLACEMENT: Primary | ICD-10-CM

## 2025-01-17 PROCEDURE — 93798 PHYS/QHP OP CAR RHAB W/ECG: CPT

## 2025-01-17 RX ORDER — SODIUM CHLORIDE 0.9 % (FLUSH) 0.9 %
10 SYRINGE (ML) INJECTION AS NEEDED
OUTPATIENT
Start: 2025-01-17

## 2025-01-17 RX ORDER — SODIUM CHLORIDE 0.9 % (FLUSH) 0.9 %
3 SYRINGE (ML) INJECTION EVERY 12 HOURS SCHEDULED
OUTPATIENT
Start: 2025-01-17

## 2025-01-17 RX ORDER — SODIUM CHLORIDE, SODIUM LACTATE, POTASSIUM CHLORIDE, CALCIUM CHLORIDE 600; 310; 30; 20 MG/100ML; MG/100ML; MG/100ML; MG/100ML
30 INJECTION, SOLUTION INTRAVENOUS CONTINUOUS PRN
OUTPATIENT
Start: 2025-01-17 | End: 2025-01-17

## 2025-01-20 ENCOUNTER — TREATMENT (OUTPATIENT)
Dept: CARDIAC REHAB | Facility: HOSPITAL | Age: 80
End: 2025-01-20
Payer: MEDICARE

## 2025-01-20 DIAGNOSIS — Z95.5 S/P DRUG ELUTING CORONARY STENT PLACEMENT: Primary | ICD-10-CM

## 2025-01-20 PROCEDURE — 93798 PHYS/QHP OP CAR RHAB W/ECG: CPT

## 2025-01-22 ENCOUNTER — TREATMENT (OUTPATIENT)
Dept: CARDIAC REHAB | Facility: HOSPITAL | Age: 80
End: 2025-01-22
Payer: MEDICARE

## 2025-01-22 DIAGNOSIS — Z95.5 S/P DRUG ELUTING CORONARY STENT PLACEMENT: Primary | ICD-10-CM

## 2025-01-22 PROCEDURE — 93798 PHYS/QHP OP CAR RHAB W/ECG: CPT

## 2025-01-24 ENCOUNTER — TREATMENT (OUTPATIENT)
Dept: CARDIAC REHAB | Facility: HOSPITAL | Age: 80
End: 2025-01-24
Payer: MEDICARE

## 2025-01-24 DIAGNOSIS — Z95.5 S/P DRUG ELUTING CORONARY STENT PLACEMENT: Primary | ICD-10-CM

## 2025-01-24 PROCEDURE — 93798 PHYS/QHP OP CAR RHAB W/ECG: CPT

## 2025-01-27 ENCOUNTER — TREATMENT (OUTPATIENT)
Dept: CARDIAC REHAB | Facility: HOSPITAL | Age: 80
End: 2025-01-27
Payer: MEDICARE

## 2025-01-27 DIAGNOSIS — Z95.5 S/P DRUG ELUTING CORONARY STENT PLACEMENT: Primary | ICD-10-CM

## 2025-01-27 PROCEDURE — 93798 PHYS/QHP OP CAR RHAB W/ECG: CPT

## 2025-01-29 ENCOUNTER — APPOINTMENT (OUTPATIENT)
Dept: CARDIAC REHAB | Facility: HOSPITAL | Age: 80
End: 2025-01-29
Payer: MEDICARE

## 2025-01-30 ENCOUNTER — TELEPHONE (OUTPATIENT)
Dept: CARDIOLOGY | Facility: CLINIC | Age: 80
End: 2025-01-30
Payer: MEDICARE

## 2025-01-30 DIAGNOSIS — Z79.02 LONG TERM (CURRENT) USE OF ANTITHROMBOTICS/ANTIPLATELETS: ICD-10-CM

## 2025-01-30 DIAGNOSIS — Z95.5 S/P DRUG ELUTING CORONARY STENT PLACEMENT: ICD-10-CM

## 2025-01-30 DIAGNOSIS — Z51.81 ENCOUNTER FOR THERAPEUTIC DRUG LEVEL MONITORING: ICD-10-CM

## 2025-01-30 DIAGNOSIS — K92.1 MELENA: Primary | ICD-10-CM

## 2025-01-30 NOTE — TELEPHONE ENCOUNTER
Please reach out to patient and let him know that I have placed additional lab order for him to have his blood counts rechecked and if this remains stable then we will plan another check in 1 month. He can have this drawn anytime at the main lab during business hrs

## 2025-01-31 ENCOUNTER — TELEPHONE (OUTPATIENT)
Dept: CARDIOLOGY | Facility: CLINIC | Age: 80
End: 2025-01-31
Payer: MEDICARE

## 2025-01-31 ENCOUNTER — TREATMENT (OUTPATIENT)
Dept: CARDIAC REHAB | Facility: HOSPITAL | Age: 80
End: 2025-01-31
Payer: MEDICARE

## 2025-01-31 ENCOUNTER — LAB (OUTPATIENT)
Dept: LAB | Facility: HOSPITAL | Age: 80
End: 2025-01-31
Payer: MEDICARE

## 2025-01-31 DIAGNOSIS — Z95.5 S/P DRUG ELUTING CORONARY STENT PLACEMENT: ICD-10-CM

## 2025-01-31 DIAGNOSIS — Z51.81 ENCOUNTER FOR THERAPEUTIC DRUG LEVEL MONITORING: ICD-10-CM

## 2025-01-31 DIAGNOSIS — K92.1 MELENA: ICD-10-CM

## 2025-01-31 DIAGNOSIS — Z95.5 S/P DRUG ELUTING CORONARY STENT PLACEMENT: Primary | ICD-10-CM

## 2025-01-31 DIAGNOSIS — Z79.02 LONG TERM (CURRENT) USE OF ANTITHROMBOTICS/ANTIPLATELETS: ICD-10-CM

## 2025-01-31 LAB
HCT VFR BLD AUTO: 39.3 % (ref 37.5–51)
HGB BLD-MCNC: 12.8 G/DL (ref 13–17.7)

## 2025-01-31 PROCEDURE — 93798 PHYS/QHP OP CAR RHAB W/ECG: CPT

## 2025-01-31 PROCEDURE — 36415 COLL VENOUS BLD VENIPUNCTURE: CPT

## 2025-01-31 PROCEDURE — 85014 HEMATOCRIT: CPT

## 2025-01-31 PROCEDURE — 85018 HEMOGLOBIN: CPT

## 2025-01-31 NOTE — TELEPHONE ENCOUNTER
Please inform patient hemoglobin remains 12.8 which is exactly what it was 2 weeks ago.  I would like to recheck this in 4 to 6 weeks and we will call him when that is due.

## 2025-01-31 NOTE — TELEPHONE ENCOUNTER
Notified patient of results/recommendations. Patient verbalized understanding.    Sophia Savage RN  Triage [facility]

## 2025-02-03 ENCOUNTER — TREATMENT (OUTPATIENT)
Dept: CARDIAC REHAB | Facility: HOSPITAL | Age: 80
End: 2025-02-03
Payer: MEDICARE

## 2025-02-03 DIAGNOSIS — Z95.5 S/P DRUG ELUTING CORONARY STENT PLACEMENT: Primary | ICD-10-CM

## 2025-02-03 PROCEDURE — 93798 PHYS/QHP OP CAR RHAB W/ECG: CPT

## 2025-02-05 ENCOUNTER — TREATMENT (OUTPATIENT)
Dept: CARDIAC REHAB | Facility: HOSPITAL | Age: 80
End: 2025-02-05
Payer: MEDICARE

## 2025-02-05 DIAGNOSIS — Z95.5 S/P DRUG ELUTING CORONARY STENT PLACEMENT: Primary | ICD-10-CM

## 2025-02-05 PROCEDURE — 93798 PHYS/QHP OP CAR RHAB W/ECG: CPT

## 2025-02-07 ENCOUNTER — TELEPHONE (OUTPATIENT)
Dept: CARDIAC REHAB | Facility: HOSPITAL | Age: 80
End: 2025-02-07
Payer: MEDICARE

## 2025-02-07 ENCOUNTER — TREATMENT (OUTPATIENT)
Dept: CARDIAC REHAB | Facility: HOSPITAL | Age: 80
End: 2025-02-07
Payer: MEDICARE

## 2025-02-07 DIAGNOSIS — Z95.5 S/P DRUG ELUTING CORONARY STENT PLACEMENT: Primary | ICD-10-CM

## 2025-02-07 PROCEDURE — 93798 PHYS/QHP OP CAR RHAB W/ECG: CPT

## 2025-02-07 NOTE — TELEPHONE ENCOUNTER
Desmond Moody - today I faxed over the patient's exercise session from today (2/7/25) at Cardiac Rehab. Patient experience a small burst of afib and/or aflutter while on the exercise bike. Patient has also had frequent PACs while at rest. Let me know if you need anything else.

## 2025-02-10 ENCOUNTER — TREATMENT (OUTPATIENT)
Dept: CARDIAC REHAB | Facility: HOSPITAL | Age: 80
End: 2025-02-10
Payer: MEDICARE

## 2025-02-10 ENCOUNTER — TELEPHONE (OUTPATIENT)
Dept: CARDIOLOGY | Facility: CLINIC | Age: 80
End: 2025-02-10
Payer: MEDICARE

## 2025-02-10 DIAGNOSIS — Z95.5 S/P DRUG ELUTING CORONARY STENT PLACEMENT: Primary | ICD-10-CM

## 2025-02-10 DIAGNOSIS — R00.2 PALPITATIONS: Primary | ICD-10-CM

## 2025-02-10 PROCEDURE — 93798 PHYS/QHP OP CAR RHAB W/ECG: CPT

## 2025-02-10 NOTE — TELEPHONE ENCOUNTER
I received contact from the cardiac rehab department regarding arrhythmia while on the treadmill.  They were concerned it was atrial fibrillation.  The baseline is quite undulating and so it is hard to tell whether or not this is actually an irregular rhythm like atrial fibrillation or flutter or if this is just sinus tachycardia with PACs as he has known diagnosis of premature atrial contractions.  I am going to put a 7-day Holter monitor on and the patient will be contacted for this study.

## 2025-02-12 ENCOUNTER — TREATMENT (OUTPATIENT)
Dept: CARDIAC REHAB | Facility: HOSPITAL | Age: 80
End: 2025-02-12
Payer: MEDICARE

## 2025-02-12 DIAGNOSIS — Z95.5 S/P DRUG ELUTING CORONARY STENT PLACEMENT: Primary | ICD-10-CM

## 2025-02-12 PROCEDURE — 93798 PHYS/QHP OP CAR RHAB W/ECG: CPT

## 2025-02-14 ENCOUNTER — TREATMENT (OUTPATIENT)
Dept: CARDIAC REHAB | Facility: HOSPITAL | Age: 80
End: 2025-02-14
Payer: MEDICARE

## 2025-02-14 DIAGNOSIS — Z95.5 S/P DRUG ELUTING CORONARY STENT PLACEMENT: Primary | ICD-10-CM

## 2025-02-14 PROCEDURE — 93798 PHYS/QHP OP CAR RHAB W/ECG: CPT

## 2025-02-17 ENCOUNTER — TREATMENT (OUTPATIENT)
Dept: CARDIAC REHAB | Facility: HOSPITAL | Age: 80
End: 2025-02-17
Payer: MEDICARE

## 2025-02-17 DIAGNOSIS — Z95.5 S/P DRUG ELUTING CORONARY STENT PLACEMENT: Primary | ICD-10-CM

## 2025-02-17 PROCEDURE — 93798 PHYS/QHP OP CAR RHAB W/ECG: CPT

## 2025-02-19 ENCOUNTER — APPOINTMENT (OUTPATIENT)
Dept: CARDIAC REHAB | Facility: HOSPITAL | Age: 80
End: 2025-02-19
Payer: MEDICARE

## 2025-02-20 ENCOUNTER — HOSPITAL ENCOUNTER (OUTPATIENT)
Dept: CARDIOLOGY | Facility: HOSPITAL | Age: 80
Discharge: HOME | End: 2025-02-20
Admitting: INTERNAL MEDICINE
Payer: MEDICARE

## 2025-02-20 DIAGNOSIS — R00.2 PALPITATIONS: ICD-10-CM

## 2025-02-20 PROCEDURE — 93246 EXT ECG>7D<15D RECORDING: CPT

## 2025-02-20 RX ORDER — NITROGLYCERIN 0.4 MG/1
0.4 TABLET SUBLINGUAL
Qty: 25 TABLET | Refills: 0 | Status: SHIPPED | OUTPATIENT
Start: 2025-02-20

## 2025-02-20 RX ORDER — METOPROLOL SUCCINATE 25 MG/1
TABLET, EXTENDED RELEASE ORAL
Qty: 90 TABLET | Refills: 1 | Status: SHIPPED | OUTPATIENT
Start: 2025-02-20

## 2025-02-21 ENCOUNTER — APPOINTMENT (OUTPATIENT)
Dept: CARDIAC REHAB | Facility: HOSPITAL | Age: 80
End: 2025-02-21
Payer: MEDICARE

## 2025-02-24 ENCOUNTER — APPOINTMENT (OUTPATIENT)
Dept: CARDIAC REHAB | Facility: HOSPITAL | Age: 80
End: 2025-02-24
Payer: MEDICARE

## 2025-02-26 ENCOUNTER — APPOINTMENT (OUTPATIENT)
Dept: CARDIAC REHAB | Facility: HOSPITAL | Age: 80
End: 2025-02-26
Payer: MEDICARE

## 2025-03-03 ENCOUNTER — TELEPHONE (OUTPATIENT)
Dept: CARDIOLOGY | Facility: CLINIC | Age: 80
End: 2025-03-03
Payer: MEDICARE

## 2025-03-03 ENCOUNTER — HOSPITAL ENCOUNTER (OUTPATIENT)
Dept: CARDIOLOGY | Facility: HOSPITAL | Age: 80
Discharge: HOME | End: 2025-03-03
Payer: MEDICARE

## 2025-03-03 ENCOUNTER — LAB (OUTPATIENT)
Dept: LAB | Facility: HOSPITAL | Age: 80
End: 2025-03-03
Payer: MEDICARE

## 2025-03-03 DIAGNOSIS — K92.1 MELENA: ICD-10-CM

## 2025-03-03 DIAGNOSIS — I65.23 BILATERAL CAROTID ARTERY STENOSIS: ICD-10-CM

## 2025-03-03 DIAGNOSIS — Z51.81 ENCOUNTER FOR THERAPEUTIC DRUG LEVEL MONITORING: ICD-10-CM

## 2025-03-03 DIAGNOSIS — K92.1 MELENA: Primary | ICD-10-CM

## 2025-03-03 LAB
BH CV XLRA MEAS LEFT DIST CCA EDV: 19.6 CM/SEC
BH CV XLRA MEAS LEFT DIST CCA PSV: 91.6 CM/SEC
BH CV XLRA MEAS LEFT DIST ICA EDV: -31 CM/SEC
BH CV XLRA MEAS LEFT DIST ICA PSV: -120.3 CM/SEC
BH CV XLRA MEAS LEFT ICA/CCA RATIO: 1.4
BH CV XLRA MEAS LEFT MID ICA EDV: -31.7 CM/SEC
BH CV XLRA MEAS LEFT MID ICA PSV: -128 CM/SEC
BH CV XLRA MEAS LEFT PROX CCA EDV: 16.3 CM/SEC
BH CV XLRA MEAS LEFT PROX CCA PSV: 86.8 CM/SEC
BH CV XLRA MEAS LEFT PROX ECA PSV: -101.5 CM/SEC
BH CV XLRA MEAS LEFT PROX ICA EDV: -31.7 CM/SEC
BH CV XLRA MEAS LEFT PROX ICA PSV: -95.1 CM/SEC
BH CV XLRA MEAS LEFT PROX SCLA PSV: 148.6 CM/SEC
BH CV XLRA MEAS LEFT VERTEBRAL A PSV: -71.5 CM/SEC
BH CV XLRA MEAS RIGHT DIST CCA EDV: 18 CM/SEC
BH CV XLRA MEAS RIGHT DIST CCA PSV: 82 CM/SEC
BH CV XLRA MEAS RIGHT DIST ICA EDV: -16.6 CM/SEC
BH CV XLRA MEAS RIGHT DIST ICA PSV: -103.2 CM/SEC
BH CV XLRA MEAS RIGHT ICA/CCA RATIO: 1.55
BH CV XLRA MEAS RIGHT MID ICA EDV: -20.3 CM/SEC
BH CV XLRA MEAS RIGHT MID ICA PSV: -113.7 CM/SEC
BH CV XLRA MEAS RIGHT PROX CCA EDV: 15.5 CM/SEC
BH CV XLRA MEAS RIGHT PROX CCA PSV: 93.2 CM/SEC
BH CV XLRA MEAS RIGHT PROX ECA PSV: -110.5 CM/SEC
BH CV XLRA MEAS RIGHT PROX ICA EDV: -26.5 CM/SEC
BH CV XLRA MEAS RIGHT PROX ICA PSV: -126.9 CM/SEC
BH CV XLRA MEAS RIGHT PROX SCLA PSV: -140.3 CM/SEC
BH CV XLRA MEAS RIGHT VERTEBRAL A PSV: -57.2 CM/SEC
DEPRECATED RDW RBC AUTO: 38.8 FL (ref 37–54)
ERYTHROCYTE [DISTWIDTH] IN BLOOD BY AUTOMATED COUNT: 11.8 % (ref 12.3–15.4)
HCT VFR BLD AUTO: 40.8 % (ref 37.5–51)
HGB BLD-MCNC: 13.1 G/DL (ref 13–17.7)
LEFT ARM BP: NORMAL MMHG
MCH RBC QN AUTO: 29 PG (ref 26.6–33)
MCHC RBC AUTO-ENTMCNC: 32.1 G/DL (ref 31.5–35.7)
MCV RBC AUTO: 90.5 FL (ref 79–97)
PLATELET # BLD AUTO: 258 10*3/MM3 (ref 140–450)
PMV BLD AUTO: 10.3 FL (ref 6–12)
RBC # BLD AUTO: 4.51 10*6/MM3 (ref 4.14–5.8)
RIGHT ARM BP: NORMAL MMHG
WBC NRBC COR # BLD AUTO: 9.34 10*3/MM3 (ref 3.4–10.8)

## 2025-03-03 PROCEDURE — 93880 EXTRACRANIAL BILAT STUDY: CPT | Performed by: SURGERY

## 2025-03-03 PROCEDURE — 85027 COMPLETE CBC AUTOMATED: CPT

## 2025-03-03 PROCEDURE — 93880 EXTRACRANIAL BILAT STUDY: CPT

## 2025-03-03 PROCEDURE — 36415 COLL VENOUS BLD VENIPUNCTURE: CPT

## 2025-03-03 NOTE — TELEPHONE ENCOUNTER
Please inform patient I reviewed his blood counts and blood counts remain strong with a hemoglobin 13.1.  Up from 12.8 just 1 month ago.  Continue Eliquis

## 2025-03-03 NOTE — TELEPHONE ENCOUNTER
Results and recommendations called to pt's wife Celeste (ok per MINNIE).  Instructed to call with any further questions or concerns.  Verbalized understanding.    Laura Pearson RN  Triage Nurse, [facility]  03/03/25 15:25 EST

## 2025-03-03 NOTE — TELEPHONE ENCOUNTER
Spoke with patient who will have repeat CBC so we can ensure that his blood counts are stable again.

## 2025-03-12 ENCOUNTER — TELEPHONE (OUTPATIENT)
Dept: CARDIOLOGY | Facility: CLINIC | Age: 80
End: 2025-03-12
Payer: MEDICARE

## 2025-03-12 LAB
CV ZIO BASELINE AVG BPM: 66 BPM
CV ZIO BASELINE BPM HIGH: 162 BPM
CV ZIO BASELINE BPM LOW: 43 BPM
CV ZIO DEVICE ANALYSIS TIME: NORMAL
CV ZIO ECT SVE COUNT: NORMAL EPISODES
CV ZIO ECT SVE CPLT COUNT: 834 EPISODES
CV ZIO ECT SVE CPLT FREQ: NORMAL
CV ZIO ECT SVE FREQ: NORMAL
CV ZIO ECT SVE TPLT COUNT: 867 EPISODES
CV ZIO ECT SVE TPLT FREQ: NORMAL
CV ZIO ECT VE COUNT: 1285 EPISODES
CV ZIO ECT VE CPLT COUNT: 6 EPISODES
CV ZIO ECT VE CPLT FREQ: NORMAL
CV ZIO ECT VE FREQ: NORMAL
CV ZIO ECT VE TPLT COUNT: 0 EPISODES
CV ZIO ECT VE TPLT FREQ: 0
CV ZIO ECTOPIC SVE COUPLET RAW PERCENT: 0.25 %
CV ZIO ECTOPIC SVE ISOLATED PERCENT: 7.36 %
CV ZIO ECTOPIC SVE TRIPLET RAW PERCENT: 0.39 %
CV ZIO ECTOPIC VE COUPLET RAW PERCENT: 0 %
CV ZIO ECTOPIC VE ISOLATED PERCENT: 0.19 %
CV ZIO ECTOPIC VE TRIPLET RAW PERCENT: 0 %
CV ZIO ENROLLMENT END: NORMAL
CV ZIO ENROLLMENT START: NORMAL
CV ZIO PATIENT EVENTS DIARIES: 1
CV ZIO PATIENT EVENTS TRIGGERS: 3
CV ZIO PAUSE COUNT: 0
CV ZIO PRESCRIPTION STATUS: NORMAL
CV ZIO SVT AVG BPM: 113 BPM
CV ZIO SVT BPM HIGH: 162 BPM
CV ZIO SVT BPM LOW: 76 BPM
CV ZIO SVT COUNT: 46
CV ZIO SVT F EPI AVG BPM: 151 BPM
CV ZIO SVT F EPI BEATS: 6 BEATS
CV ZIO SVT F EPI BPM HIGH: 162 BPM
CV ZIO SVT F EPI BPM LOW: 140 BPM
CV ZIO SVT F EPI DUR: 2.2 SEC
CV ZIO SVT F EPI END: NORMAL
CV ZIO SVT F EPI START: NORMAL
CV ZIO SVT L EPI AVG BPM: 110 BPM
CV ZIO SVT L EPI BEATS: 37 BEATS
CV ZIO SVT L EPI BPM HIGH: 119 BPM
CV ZIO SVT L EPI BPM LOW: 95 BPM
CV ZIO SVT L EPI DUR: 20.4 SEC
CV ZIO SVT L EPI END: NORMAL
CV ZIO SVT L EPI START: NORMAL
CV ZIO TOTAL  ENROLLMENT PERIOD: NORMAL
CV ZIO VT COUNT: 0

## 2025-03-12 NOTE — TELEPHONE ENCOUNTER
I spoke to the patient's wife about frequent PACs but no sustained arrhythmias.  No indication for change in medications or further testing at this time.

## 2025-03-28 ENCOUNTER — OFFICE VISIT (OUTPATIENT)
Dept: ORTHOPEDIC SURGERY | Facility: CLINIC | Age: 80
End: 2025-03-28
Payer: MEDICARE

## 2025-03-28 VITALS — WEIGHT: 214.4 LBS | BODY MASS INDEX: 31.76 KG/M2 | TEMPERATURE: 98.3 F | HEIGHT: 69 IN

## 2025-03-28 DIAGNOSIS — M17.0 PRIMARY OSTEOARTHRITIS OF BOTH KNEES: Primary | ICD-10-CM

## 2025-03-28 DIAGNOSIS — G89.29 CHRONIC PAIN OF LEFT KNEE: ICD-10-CM

## 2025-03-28 DIAGNOSIS — M25.562 CHRONIC PAIN OF LEFT KNEE: ICD-10-CM

## 2025-03-28 RX ORDER — LIDOCAINE HYDROCHLORIDE 10 MG/ML
2 INJECTION, SOLUTION EPIDURAL; INFILTRATION; INTRACAUDAL; PERINEURAL
Status: COMPLETED | OUTPATIENT
Start: 2025-03-28 | End: 2025-03-28

## 2025-03-28 RX ORDER — METHYLPREDNISOLONE ACETATE 80 MG/ML
80 INJECTION, SUSPENSION INTRA-ARTICULAR; INTRALESIONAL; INTRAMUSCULAR; SOFT TISSUE
Status: COMPLETED | OUTPATIENT
Start: 2025-03-28 | End: 2025-03-28

## 2025-03-28 RX ADMIN — LIDOCAINE HYDROCHLORIDE 2 ML: 10 INJECTION, SOLUTION EPIDURAL; INFILTRATION; INTRACAUDAL; PERINEURAL at 09:03

## 2025-03-28 RX ADMIN — METHYLPREDNISOLONE ACETATE 80 MG: 80 INJECTION, SUSPENSION INTRA-ARTICULAR; INTRALESIONAL; INTRAMUSCULAR; SOFT TISSUE at 09:03

## 2025-03-28 NOTE — PROGRESS NOTES
[facility]  Follow Up Visit      Patient Name: Jed Correia Sr.  : 1945  Primary Care Physician: Myke Landaverde MD        Chief Complaint: Left knee pain     HPI:   Jed Correia Sr. is a 79 y.o. year old who presents today for an injection. I saw him last nearly 7.5 months ago for both knees. We have done intermittent injections for his knee arthritis with good relief in his symptoms. His right knee is doing well. He reports his left knee has started bothering him just a bit and he would like to inject it again today. He is not interested in surgery at this point as conservative management has been working well.   History of Present Illness      ROS:  ROS negative except as listed in the HPI.    Allergies:   Allergies   Allergen Reactions    Bacitracin-Polymyxin B Unknown - Low Severity    Iodine Rash    Latex Rash    Neomycin-Bacitracin Zn-Polymyx Unknown - Low Severity    Povidone-Iodine Rash       Medications:  Reviewed in EPIC    PMH:  Pertinent conditions reviewed in [facility]    Physical Exam:   79 y.o. male  Body mass index is 31.66 kg/m²., 97.3 kg (214 lb 6.4 oz)  Vitals:    25 0850   Temp: 98.3 °F (36.8 °C)     General: Alert, cooperative, appears well and in no observable distress. Appears stated age and BMI as listed above.  Respiratory: Normal respiratory effort.  Skin: Warm & well perfused; appropriate skin turgor.  Psych: Appropriate mood & affect.  Physical Exam      Radiology:    Results      No new images were needed at the visit today. Prior images noted and or reviewed as needed.    Procedure:   See Procedure Note: The potential risks and benefits of performing a diagnostic and therapeutic injection were discussed with the patient prior to procedure. Risks include, but are not limited to infection, swelling, transient increase in pain, bleeding, bruising. Patient was advised that injections are a diagnostic and therapeutic tool meaning they may not alleviate symptoms at all,  or may only provide partial or temporary relief. Injection precautions and aftercare discussed. and Patient is currently on anticoagulation and/or a blood thinning agent which poses an increased risk of more significant bleeding or bruising following an injection. While joint injections are generally well tolerated even on AC, the patient was counseled on this increased risk and advised to monitor for signs of bleeding with proper follow up instructions. Injection precautions and aftercare discussed.      Assessment & Plan:      ICD-10-CM ICD-9-CM   1. Primary osteoarthritis of both knees  M17.0 715.16   2. Chronic pain of left knee  M25.562 719.46    G89.29 338.29     No orders of the defined types were placed in this encounter.    Orders Placed This Encounter   Procedures    Large Joint Arthrocentesis: L knee     I think another injection is reasonable. He has been doing them about twice a year with good relief in symptoms. He will follow up as needed.  Assessment & Plan      Plan to proceed with an injection today which the patient tolerated well.   Continue with activity modifications as needed/discussed.  Continue ICE and/or HEAT PRN.  Recommend to continue activity as tolerated, focus on quad and hip/core strengthening as well as gentle stretching.  Recommend lowering or maintaining BMI within a healthy range to reduce symptoms.   Patient encouraged to call with questions or concerns prior to follow up.    Consider additional referrals, work up and/or advanced imaging as indicated or if patient fails to respond to conservative care.    Return if symptoms worsen or fail to improve.        Large Joint Arthrocentesis: L knee  Date/Time: 3/28/2025 9:03 AM  Consent given by: patient  Site marked: site marked  Timeout: Immediately prior to procedure a time out was called to verify the correct patient, procedure, equipment, support staff and site/side marked as required   Supporting Documentation  Indications: pain    Procedure Details  Location: knee - L knee  Preparation: Patient was prepped and draped in the usual sterile fashion  Needle gauge: 21G.  Approach: anterolateral  Medications administered: 80 mg methylPREDNISolone acetate 80 MG/ML; 2 mL lidocaine PF 1% 1 %  Patient tolerance: patient tolerated the procedure well with no immediate complications         KARIS Sue    Dictation software was used to complete a portion or all of this note.

## 2025-03-31 ENCOUNTER — TELEPHONE (OUTPATIENT)
Dept: CARDIOLOGY | Age: 80
End: 2025-03-31
Payer: MEDICARE

## 2025-03-31 NOTE — TELEPHONE ENCOUNTER
Michelle from [facility] is calling for cardiac clearance and med hold instructions.    Patient is scheduled for colonoscopy for large polyp removal on 4/9.  She is asking if patient is cleared and also patient would need to hold plavix for 5 days if that is possible?    She is also asking if pt is on eliquis per this documentation:      I don't see this on med list.  But if pt on eliquis would need to stop eliquis for 3 days.    Callback # [phone].  She is also going to fax clearance to main fax if you would rather wait for that.    Ema Conrad RN  [facility] Triage  03/31/25 12:28 EDT

## 2025-03-31 NOTE — TELEPHONE ENCOUNTER
He is cleared to proceed with colonoscopy and may hold Plavix 5 days prior.  He will need to resume Plavix soon as possible after colonoscopy.      KARIS Erickson  [facility]   [address]  [place] [address]  Ph: [phone]  Fax: [phone]

## 2025-03-31 NOTE — TELEPHONE ENCOUNTER
Called karina and Michelle is on break but I let a message letting them know clearance is in epic and to call with any questions.    Ema Conrad RN  [facility] T[facility]  03/31/25 13:16 EDT

## 2025-04-01 ENCOUNTER — TELEPHONE (OUTPATIENT)
Dept: GASTROENTEROLOGY | Facility: CLINIC | Age: 80
End: 2025-04-01
Payer: MEDICARE

## 2025-04-01 NOTE — TELEPHONE ENCOUNTER
Spoke to the patient and he is aware that he has cardiac clearance for his colonoscopy and ok to hold his plavix for 5 days

## 2025-04-09 ENCOUNTER — HOSPITAL ENCOUNTER (OUTPATIENT)
Facility: HOSPITAL | Age: 80
Setting detail: HOSPITAL OUTPATIENT SURGERY
Discharge: HOME | End: 2025-04-09
Attending: INTERNAL MEDICINE | Admitting: INTERNAL MEDICINE
Payer: MEDICARE

## 2025-04-09 ENCOUNTER — ANESTHESIA EVENT (OUTPATIENT)
Dept: GASTROENTEROLOGY | Facility: HOSPITAL | Age: 80
End: 2025-04-09
Payer: MEDICARE

## 2025-04-09 ENCOUNTER — ANESTHESIA (OUTPATIENT)
Dept: GASTROENTEROLOGY | Facility: HOSPITAL | Age: 80
End: 2025-04-09
Payer: MEDICARE

## 2025-04-09 VITALS
OXYGEN SATURATION: 100 % | SYSTOLIC BLOOD PRESSURE: 142 MMHG | TEMPERATURE: 98.1 F | BODY MASS INDEX: 30.51 KG/M2 | DIASTOLIC BLOOD PRESSURE: 89 MMHG | WEIGHT: 206 LBS | HEIGHT: 69 IN | RESPIRATION RATE: 16 BRPM | HEART RATE: 56 BPM

## 2025-04-09 DIAGNOSIS — K63.5 POLYP OF COLON, UNSPECIFIED PART OF COLON, UNSPECIFIED TYPE: ICD-10-CM

## 2025-04-09 PROCEDURE — 45385 COLONOSCOPY W/LESION REMOVAL: CPT | Performed by: INTERNAL MEDICINE

## 2025-04-09 PROCEDURE — 25010000002 GLYCOPYRROLATE 0.2 MG/ML SOLUTION: Performed by: NURSE ANESTHETIST, CERTIFIED REGISTERED

## 2025-04-09 PROCEDURE — 25810000003 LACTATED RINGERS PER 1000 ML: Performed by: NURSE PRACTITIONER

## 2025-04-09 PROCEDURE — 45380 COLONOSCOPY AND BIOPSY: CPT | Performed by: INTERNAL MEDICINE

## 2025-04-09 PROCEDURE — 25010000002 PROPOFOL 10 MG/ML EMULSION: Performed by: NURSE ANESTHETIST, CERTIFIED REGISTERED

## 2025-04-09 PROCEDURE — 88305 TISSUE EXAM BY PATHOLOGIST: CPT | Performed by: INTERNAL MEDICINE

## 2025-04-09 RX ORDER — SODIUM CHLORIDE 0.9 % (FLUSH) 0.9 %
3 SYRINGE (ML) INJECTION EVERY 12 HOURS SCHEDULED
Status: DISCONTINUED | OUTPATIENT
Start: 2025-04-09 | End: 2025-04-09 | Stop reason: HOSPADM

## 2025-04-09 RX ORDER — SODIUM CHLORIDE, SODIUM LACTATE, POTASSIUM CHLORIDE, CALCIUM CHLORIDE 600; 310; 30; 20 MG/100ML; MG/100ML; MG/100ML; MG/100ML
30 INJECTION, SOLUTION INTRAVENOUS CONTINUOUS PRN
Status: DISCONTINUED | OUTPATIENT
Start: 2025-04-09 | End: 2025-04-09 | Stop reason: HOSPADM

## 2025-04-09 RX ORDER — PROPOFOL 10 MG/ML
VIAL (ML) INTRAVENOUS AS NEEDED
Status: DISCONTINUED | OUTPATIENT
Start: 2025-04-09 | End: 2025-04-09 | Stop reason: SURG

## 2025-04-09 RX ORDER — SODIUM CHLORIDE 0.9 % (FLUSH) 0.9 %
10 SYRINGE (ML) INJECTION AS NEEDED
Status: DISCONTINUED | OUTPATIENT
Start: 2025-04-09 | End: 2025-04-09 | Stop reason: HOSPADM

## 2025-04-09 RX ORDER — GLYCOPYRROLATE 0.2 MG/ML
INJECTION INTRAMUSCULAR; INTRAVENOUS AS NEEDED
Status: DISCONTINUED | OUTPATIENT
Start: 2025-04-09 | End: 2025-04-09 | Stop reason: SURG

## 2025-04-09 RX ADMIN — GLYCOPYRROLATE 0.2 MG: 0.2 INJECTION INTRAMUSCULAR; INTRAVENOUS at 12:23

## 2025-04-09 RX ADMIN — PROPOFOL 100 MG: 10 INJECTION, EMULSION INTRAVENOUS at 12:24

## 2025-04-09 RX ADMIN — SODIUM CHLORIDE, POTASSIUM CHLORIDE, SODIUM LACTATE AND CALCIUM CHLORIDE 30 ML/HR: 600; 310; 30; 20 INJECTION, SOLUTION INTRAVENOUS at 11:40

## 2025-04-09 RX ADMIN — PROPOFOL 180 MCG/KG/MIN: 10 INJECTION, EMULSION INTRAVENOUS at 12:24

## 2025-04-09 NOTE — H&P
No chief complaint on file.      HPI  Patient today for a colonoscopy for screening.  He has history of colon polyps.         Problem List:    Patient Active Problem List   Diagnosis    Positional sleep apnea    Hx of colonic polyps    FH: colon cancer    FH: colon polyps    Hx of malignant neoplasm of colon    Positional sleep apnea    Tear of medial meniscus of right knee, current    Encounter for screening for malignant neoplasm of colon    Herniation of cervical intervertebral disc with radiculopathy    Unstable angina    GIB (gastrointestinal bleeding)    HTN (hypertension)    CAD (coronary artery disease)    Asthma    Gastrointestinal hemorrhage    Melena    Polyp of colon       Medical History:    Past Medical History:   Diagnosis Date    Ankle sprain     Arthritis     Arthritis of back     Arthritis of neck     Cancer 1986    COLON, PROSTATE, SKIN    Cervical disc disorder     Clotting disorder     I take clopidogrel and baby aspirin    Coronary artery disease Spring 2000    Fracture, foot     GI bleed 2025    H/O arthroscopy of left knee     H/O arthroscopy of right knee     Hyperlipidemia     Successfully being treated with medicine    Hypertension . Currently controlled with medication. M    Knee swelling     Positional sleep apnea 2019    Home sleep study.  AHI normal at 2/h for total monitoring time.  When supine, mildly abnormal at 6.4 events per hour.  No sleep-related hypoxia.    Tear of meniscus of knee     Tendinitis of knee     Thoracic disc disorder     Trouble swallowing         Social History:    Social History     Socioeconomic History    Marital status:    Tobacco Use    Smoking status: Former     Current packs/day: 0.00     Average packs/day: 0.5 packs/day for 17.3 years (8.7 ttl pk-yrs)     Types: Cigarettes, Pipe     Start date: 1960     Quit date: 1978     Years since quittin.3     Passive exposure: Past (family smoked in the   home)    Smokeless tobacco: Never    Tobacco comments:     Light smoker during this period   Vaping Use    Vaping status: Never Used   Substance and Sexual Activity    Alcohol use: Not Currently     Comment: 2 drinks at social fumctioms    Drug use: No    Sexual activity: Not Currently     Partners: Female     Comment: Prostate removal nerve damage.       Family History:   Family History   Problem Relation Age of Onset    Cancer Sister     Anemia Sister     Cancer Brother     Alcohol abuse Brother     Heart attack Brother         Heart bypass surgery. Fifteen years later had leg amputations due to extermity circulatory issues.    Hypertension Brother     ALS Brother     Cancer Brother     Asthma Brother     Malig Hyperthermia Neg Hx        Surgical History:   Past Surgical History:   Procedure Laterality Date    CARDIAC CATHETERIZATION  2014    stents    CARDIAC CATHETERIZATION N/A 11/07/2024    Procedure: Left Heart Cath;  Surgeon: Adan Payne MD;  Location: [facility] CATH INVASIVE LOCATION;  Service: Cardiology;  Laterality: N/A;    CARDIAC CATHETERIZATION N/A 11/07/2024    Procedure: Left ventriculography;  Surgeon: Adan Payne MD;  Location: [facility] CATH INVASIVE LOCATION;  Service: Cardiology;  Laterality: N/A;    CARDIAC CATHETERIZATION N/A 11/07/2024    Procedure: Percutaneous Coronary Intervention;  Surgeon: Adan Payne MD;  Location: [facility] CATH INVASIVE LOCATION;  Service: Cardiology;  Laterality: N/A;    CARDIAC CATHETERIZATION N/A 11/07/2024    Procedure: Stent PETER coronary;  Surgeon: Adan Payne MD;  Location: [facility] CATH INVASIVE LOCATION;  Service: Cardiology;  Laterality: N/A;    COLON SURGERY      COLON RESECTION, COLON CANCER REMOVAL    COLONOSCOPY N/A 08/17/2018    Procedure: COLONOSCOPY TO CECUM/TI WITH POLYPECTOMY ( COLD BX);  Surgeon: Moise Garcia MD;  Location: [facility] ENDOSCOPY;  Service: Gastroenterology    COLONOSCOPY N/A 08/21/2023    Procedure: COLONOSCOPY TO CECUM;   Surgeon: Ki Ash MD;  Location: [facility] MAIN OR;  Service: Gastroenterology;  Laterality: N/A;  POLYPS, HEMORRHOIDS    COLONOSCOPY N/A 01/04/2025    Procedure: COLONOSCOPY TO CECUM AND TERMINAL ILEUM;  Surgeon: Shadi Cabral MD;  Location: [facility] ENDOSCOPY;  Service: Gastroenterology;  Laterality: N/A;  PRE- GI BLEED  POST- COLON POLYP-UNRETIEVED, NORMAL TI, HEMORRHOIDS    CORONARY STENT PLACEMENT  Around 2016    Had persistent angina while travelling    ENDOSCOPY N/A 01/03/2025    Procedure: ESOPHAGOGASTRODUODENOSCOPY;  Surgeon: Rina Grant MD;  Location: [facility] ENDOSCOPY;  Service: Gastroenterology;  Laterality: N/A;  melena  normal    EYE SURGERY      CATARACTS, MACULAR HOLE REPAIR    INTERVENTIONAL RADIOLOGY PROCEDURE N/A 11/07/2024    Procedure: Intravascular Ultrasound;  Surgeon: Adan Payne MD;  Location: [facility] CATH INVASIVE LOCATION;  Service: Cardiology;  Laterality: N/A;    KNEE ARTHROSCOPY Right 01/13/2020    Procedure: KNEE ARTHROSCOPY, PARTIAL MEDIAL AND LATERAL MENISECTOMY, AND DEBRIDEMENT OF ARTHRITIS;  Surgeon: Mercedes Falk MD;  Location: [facility] OR OSC;  Service: Orthopedics    KNEE SURGERY      PROSTATE SURGERY      CANCER    SKIN BIOPSY      5X-BASAL CELL CARCINOMA         Current Facility-Administered Medications:     lactated ringers infusion, 30 mL/hr, Intravenous, Continuous PRN, Daquan, Mony G, APRN    sodium chloride 0.9 % flush 10 mL, 10 mL, Intravenous, PRN, Daquan, Mony G, APRN    sodium chloride 0.9 % flush 3 mL, 3 mL, Intravenous, Q12H, Daquan, Mony G, APRN    Allergies:   Allergies   Allergen Reactions    Bacitracin-Polymyxin B Rash    Iodine Rash    Latex Rash    Neomycin-Bacitracin Zn-Polymyx Rash    Povidone-Iodine Rash        The following portions of the patient's history were reviewed by me and updated as appropriate: review of systems, allergies, current medications, past family history, past medical history, past social history, past  surgical history and problem list.    There were no vitals filed for this visit.    PHYSICAL EXAM:    CONSTITUTIONAL:  today's vital signs reviewed by me  GASTROINTESTINAL: abdomen is soft nontender nondistended with normal active bowel sounds, no masses are appreciated    Assessment/ Plan  We will proceed today with colonoscopy.    Risks and benefits as well as alternatives to endoscopic evaluation were explained to the patient and they voiced understanding and wish to proceed.  These risks include but are not limited to the risk of bleeding, perforation, adverse reaction to sedation, and missed lesions.  The patient was given the opportunity to ask questions prior to the endoscopic procedure.

## 2025-04-09 NOTE — ANESTHESIA PREPROCEDURE EVALUATION
Anesthesia Evaluation     Patient summary reviewed and Nursing notes reviewed   NPO Solid Status: > 8 hours  NPO Liquid Status: > 2 hours           Airway   Mallampati: II  TM distance: >3 FB  Neck ROM: full  Dental - normal exam     Pulmonary    (+) a smoker Former, asthma,sleep apnea (appliance)  Cardiovascular   Exercise tolerance: good (4-7 METS)    ECG reviewed  PT is on anticoagulation therapy  Patient on routine beta blocker and Beta blocker given within 24 hours of surgery    (+) hypertension well controlled less than 2 medications, CAD, cardiac stents Drug eluting stent within the past 12 months , hyperlipidemia  (-) angina    ROS comment: ·  Left ventricular systolic function is normal. Calculated left ventricular EF = 65.7% Normal left ventricular cavity size noted. Left ventricular wall thickness is consistent with mild concentric hypertrophy. All left ventricular wall segments contract normally. Left ventricular diastolic function is consistent with (grade I) impaired relaxation.  ·  There is moderate calcification of the aortic valve. Mild aortic valve regurgitation is present. No aortic valve stenosis is present.  ·  Mild mitral valve regurgitation is present.  ·  Trace tricuspid valve regurgitation is present. Estimated right ventricular systolic pressure from tricuspid regurgitation is normal (<35 mmHg). Calculated right ventricular systolic pressure from tricuspid regurgitation is 15 mmHg.      Neuro/Psych  GI/Hepatic/Renal/Endo    (+) GI bleeding     Musculoskeletal     Abdominal    Substance History   (+) alcohol use     OB/GYN          Other   arthritis,   history of cancer                      Anesthesia Plan    ASA 3     MAC   total IV anesthesia  (  )    Anesthetic plan, risks, benefits, and alternatives have been provided, discussed and informed consent has been obtained with: patient.        CODE STATUS:

## 2025-04-09 NOTE — ANESTHESIA POSTPROCEDURE EVALUATION
Patient: Jed Correia Sr.    Procedure Summary       Date: 04/09/25 Room / Location: [facility] ENDOSCOPY 7 /  [facility]    Anesthesia Start: 1219 Anesthesia Stop: 1252    Procedure: COLONOSCOPY into cecum with cold snare and biopsy polypectomies Diagnosis:       Polyp of colon, unspecified part of colon, unspecified type      (Polyp of colon, unspecified part of colon, unspecified type [K63.5])    Surgeons: Ki Ash MD Provider: Geri Lynn MD    Anesthesia Type: MAC ASA Status: 3            Anesthesia Type: MAC    Vitals  Vitals Value Taken Time   /89 04/09/25 13:11   Temp     Pulse 57 04/09/25 13:16   Resp 15 04/09/25 13:01   SpO2 100 % 04/09/25 13:16   Vitals shown include unfiled device data.        Post Anesthesia Care and Evaluation    Patient location during evaluation: PHASE II  Patient participation: complete - patient participated  Level of consciousness: awake  Pain management: adequate    Airway patency: patent  Anesthetic complications: No anesthetic complications  PONV Status: none  Cardiovascular status: stable  Respiratory status: acceptable  Hydration status: acceptable

## 2025-06-26 NOTE — PROGRESS NOTES
RM:__________                    PCP:Myke Landaverde MD                        Last EKG:    :_1945                                                                    AGE:80 y.o.      REASON FOR VISIT:____________________________________________________________________________        WT:____________    HT:____________   BP:____________  HR:____________       SMOKER: CURRENT/PPD:___________________ FORMER:______________ NEVER:______________    RECENT HOSPITALIZATIONS OR   ER VISITS:________________________________________________________        Lipid Panel          2024    11:52 2024    04:29   Lipid Panel   Total Cholesterol  120    Total Cholesterol 131        Triglycerides 95     78    HDL Cholesterol 55     41    VLDL Cholesterol  16    LDL Cholesterol   63    LDL/HDL Ratio 1.04     1.55       Details          This result is from an external source.

## 2025-07-08 ENCOUNTER — OFFICE VISIT (OUTPATIENT)
Dept: CARDIOLOGY | Age: 80
End: 2025-07-08
Payer: MEDICARE

## 2025-07-08 VITALS
SYSTOLIC BLOOD PRESSURE: 144 MMHG | BODY MASS INDEX: 31.4 KG/M2 | WEIGHT: 212 LBS | OXYGEN SATURATION: 96 % | HEIGHT: 69 IN | HEART RATE: 73 BPM | DIASTOLIC BLOOD PRESSURE: 82 MMHG

## 2025-07-08 DIAGNOSIS — Z95.5 S/P DRUG ELUTING CORONARY STENT PLACEMENT: ICD-10-CM

## 2025-07-08 DIAGNOSIS — I10 PRIMARY HYPERTENSION: ICD-10-CM

## 2025-07-08 DIAGNOSIS — I65.23 BILATERAL CAROTID ARTERY STENOSIS: ICD-10-CM

## 2025-07-08 DIAGNOSIS — I25.10 CORONARY ARTERY DISEASE INVOLVING NATIVE CORONARY ARTERY OF NATIVE HEART WITHOUT ANGINA PECTORIS: Primary | ICD-10-CM

## 2025-07-08 RX ORDER — TRIAMCINOLONE ACETONIDE 1 MG/G
1 CREAM TOPICAL AS NEEDED
COMMUNITY
Start: 2025-06-28

## 2025-07-08 RX ORDER — ASPIRIN 81 MG/1
81 TABLET ORAL DAILY
COMMUNITY

## 2025-07-08 NOTE — PROGRESS NOTES
[facility]      Patient Name: Jed Correia Sr.  :1945  Age: 80 y.o.  Encounter Provider:  Jed Moody Jr, MD      Chief Complaint: Initial evaluation of patient with coronary artery disease      HPI  Jed Correia Sr. is a 80 y.o. male past medical history of coronary artery disease status post PCI  and premature atrial contractions who presents for follow-up evaluation.      Last clinic visit note: Patient previously followed by Dr. Flaco Stearns of the [facility].  Intervention in  with no subsequent needs for intervention.  He is a very active gentleman walks about 2 miles or rides the stationary bike for 9 to 10 miles daily.  No chest pain or shortness of air.  No orthopnea, PND or edema.  He has occasional palpitations and irregular heart rate has been a chronic issue for him.  Holter monitor in 2022 showed 15% PACs but no sustained arrhythmias.  He denies dizziness syncope.  He has a history of carotid stenosis with 50 to 69% on the left on ultrasound carotids .  No focal neurological deficits or other neurological complaints.  He is an ex-smoker quit , drinks socially and denies illicit drug use.  History of brother with myocardial infarction followed by CABG.    Doing well since last visit.  Rapid severe progression of exertional dyspnea prompted cardiac catheterization on 2024.  Obstructive disease distal to previous RCA stent addressed by PCI with Dr. Payne.  Patient states complete resolution of symptoms.  He has been in cardiac rehab for the last 3 weeks and denies any chest pain or shortness of air that is out of proportion.  No orthopnea, PND or edema.  No palpitations, dizziness or syncope.  Blood pressure and heart rate are well-controlled today in clinic.    We were called by cardiac rehab with concern for atrial arrhythmia.  Holter monitor showed no more than frequent PACs.  He was having anemia and melena for which  "he was seen in the hospital in January.  He had multiple polyps but given his need for Plavix they delayed polyp removal at that time.  He had recurrent melena and was able to come off Plavix in March at which time 2 polyps were removed.  He has had no more melena since then.  No angina.  No orthopnea, PND or edema.  No palpitations, dizziness or syncope.    The following portions of the patient's history were reviewed and updated as appropriate: allergies, current medications, past family history, past medical history, past social history, past surgical history and problem list.      Review of Systems   Constitutional: Negative for chills and fever.   HENT:  Negative for hoarse voice and sore throat.    Eyes:  Negative for double vision and photophobia.   Cardiovascular:  Positive for palpitations. Negative for chest pain, leg swelling, near-syncope, orthopnea, paroxysmal nocturnal dyspnea and syncope.   Respiratory:  Negative for cough and wheezing.    Skin:  Negative for poor wound healing and rash.   Musculoskeletal:  Negative for arthritis and joint swelling.   Gastrointestinal:  Negative for bloating, abdominal pain, hematemesis and hematochezia.   Neurological:  Negative for dizziness and focal weakness.   Psychiatric/Behavioral:  Negative for depression and suicidal ideas.        OBJECTIVE:   Vital Signs  Vitals:    07/08/25 1056   BP: 144/82   Pulse: 73   SpO2: 96%     Estimated body mass index is 31.31 kg/m² as calculated from the following:    Height as of this encounter: 175.3 cm (69\").    Weight as of this encounter: 96.2 kg (212 lb).    Vitals reviewed.   Constitutional:       Appearance: Healthy appearance. Not in distress.   Neck:      Vascular: No JVR. JVD normal.   Pulmonary:      Effort: Pulmonary effort is normal.      Breath sounds: Normal breath sounds. No wheezing. No rhonchi. No rales.   Chest:      Chest wall: Not tender to palpatation.   Cardiovascular:      PMI at left midclavicular line. " Normal rate. Regular rhythm. Normal S1. Normal S2.       Murmurs: There is a systolic murmur.      No gallop.  No click. No rub.   Pulses:     Intact distal pulses.   Edema:     Peripheral edema absent.   Abdominal:      General: Bowel sounds are normal.      Palpations: Abdomen is soft.      Tenderness: There is no abdominal tenderness.   Musculoskeletal: Normal range of motion.         General: No tenderness. Skin:     General: Skin is warm and dry.   Neurological:      General: No focal deficit present.      Mental Status: Alert and oriented to person, place and time.         Procedures          ASSESSMENT:     CAD without angina  Carotid stenosis asymptomatic  Premature atrial contractions  Hypertension  Dyslipidemia    PLAN OF CARE:     CAD status post recent stent -he will continue Plavix monotherapy with resolution of melena.  Continue current medical therapy.  Last LDL at goal.  Carotid stenosis asymptomatic - He follows with vascular surgery.  Premature atrial contractions -minimal impact on quality of life.  No sustained arrhythmias on recent Holter.  Monitor clinical progress.  Hypertension -better after PCI and addition of amlodipine 5 mg.  Continue same.  Dyslipidemia -as above  Murmur -mild aortic insufficiency on last echo.  Euvolemic in clinic today.    Return to clinic 6 months             Discharge Medications            Accurate as of July 8, 2025 11:22 AM. If you have any questions, ask your nurse or doctor.                Changes to Medications        Instructions Start Date   valsartan 320 MG tablet  Commonly known as: DIOVAN  What changed: when to take this   320 mg, Oral, Daily             Continue These Medications        Instructions Start Date   amLODIPine 5 MG tablet  Commonly known as: NORVASC   5 mg, Oral, Daily      aspirin 81 MG EC tablet   81 mg, Daily      atorvastatin 20 MG tablet  Commonly known as: LIPITOR   20 mg, Oral, Nightly      clopidogrel 75 MG tablet  Commonly known as:  PLAVIX   75 mg, Oral, Daily      Co Q 10 10 MG capsule   1 tablet, Oral, Daily      desonide 0.05 % lotion  Commonly known as: DESOWEN   2 Times Daily      ezetimibe 10 MG tablet  Commonly known as: ZETIA   10 mg, Oral, Daily      fish oil 1000 MG capsule capsule   1,000 mg, Daily With Breakfast      folic acid 1 MG tablet  Commonly known as: FOLVITE   1 mg, Daily      glucosamine-chondroitin 500-400 MG capsule capsule   2 capsules, Daily      metoprolol succinate XL 25 MG 24 hr tablet  Commonly known as: TOPROL-XL   TAKE 1/2 TABLET BY MOUTH EVERY DAY AND HOLD FOR HEART RATE LESS THAN 60 BPM      multivitamin with minerals tablet tablet   1 tablet, Daily      nitroglycerin 0.4 MG SL tablet  Commonly known as: NITROSTAT   0.4 mg, Sublingual, Every 5 Minutes PRN      pantoprazole 40 MG EC tablet  Commonly known as: PROTONIX   40 mg, Daily      PROBIOTIC DAILY PO   1 capsule, Daily      Symbicort 80-4.5 MCG/ACT inhaler  Generic drug: budesonide-formoterol   INHALE 2 PUFFS BY MOUTH TWICE DAILY. RINSE MOUTH WITH WATER AFTER USE FOR AFTERTASTE AND INCIDENCE OF CANDIDIASIS. DO NOT SWALLOW      triamcinolone 0.1 % cream  Commonly known as: KENALOG   1 Application, As Needed      venlafaxine 75 MG tablet  Commonly known as: EFFEXOR   75 mg, Every 24 Hours      vitamin B-12 100 MCG tablet  Commonly known as: CYANOCOBALAMIN   50 mcg, Daily      vitamin B-6 50 MG tablet  Commonly known as: PYRIDOXINE   50 mg, Daily      Vitamin D-3 25 MCG (1000 UT) capsule   2,000 Units, Daily               Thank you for allowing me to participate in the care of your patient,      Sincerely,   Jed Moody MD  [facility]  07/08/25  11:22 EDT

## 2025-07-09 RX ORDER — METOPROLOL SUCCINATE 25 MG/1
TABLET, EXTENDED RELEASE ORAL
Qty: 90 TABLET | Refills: 1 | Status: SHIPPED | OUTPATIENT
Start: 2025-07-09

## 2025-08-11 RX ORDER — AMLODIPINE BESYLATE 5 MG/1
5 TABLET ORAL DAILY
Qty: 30 TABLET | Refills: 11 | Status: SHIPPED | OUTPATIENT
Start: 2025-08-11

## 2025-08-11 RX ORDER — AMLODIPINE BESYLATE 5 MG/1
5 TABLET ORAL DAILY
Qty: 30 TABLET | Refills: 11 | OUTPATIENT
Start: 2025-08-11

## 2025-08-26 RX ORDER — AMLODIPINE BESYLATE 5 MG/1
5 TABLET ORAL DAILY
Qty: 30 TABLET | Refills: 11 | Status: SHIPPED | OUTPATIENT
Start: 2025-08-26

## 2025-08-28 ENCOUNTER — OFFICE VISIT (OUTPATIENT)
Dept: ORTHOPEDIC SURGERY | Facility: CLINIC | Age: 80
End: 2025-08-28
Payer: MEDICARE

## 2025-08-28 VITALS — BODY MASS INDEX: 30.96 KG/M2 | TEMPERATURE: 98.5 F | HEIGHT: 68 IN | WEIGHT: 204.3 LBS

## 2025-08-28 DIAGNOSIS — M25.562 CHRONIC PAIN OF LEFT KNEE: Primary | ICD-10-CM

## 2025-08-28 DIAGNOSIS — G89.29 CHRONIC PAIN OF LEFT KNEE: Primary | ICD-10-CM

## 2025-08-28 DIAGNOSIS — M17.0 PRIMARY OSTEOARTHRITIS OF BOTH KNEES: ICD-10-CM

## 2025-08-28 RX ORDER — LIDOCAINE HYDROCHLORIDE 10 MG/ML
2 INJECTION, SOLUTION EPIDURAL; INFILTRATION; INTRACAUDAL; PERINEURAL
Status: COMPLETED | OUTPATIENT
Start: 2025-08-28 | End: 2025-08-28

## 2025-08-28 RX ORDER — METHYLPREDNISOLONE ACETATE 80 MG/ML
80 INJECTION, SUSPENSION INTRA-ARTICULAR; INTRALESIONAL; INTRAMUSCULAR; SOFT TISSUE
Status: COMPLETED | OUTPATIENT
Start: 2025-08-28 | End: 2025-08-28

## 2025-08-28 RX ORDER — BETAMETHASONE DIPROPIONATE 0.5 MG/G
CREAM TOPICAL
COMMUNITY
Start: 2025-07-10

## 2025-08-28 RX ADMIN — METHYLPREDNISOLONE ACETATE 80 MG: 80 INJECTION, SUSPENSION INTRA-ARTICULAR; INTRALESIONAL; INTRAMUSCULAR; SOFT TISSUE at 11:00

## 2025-08-28 RX ADMIN — LIDOCAINE HYDROCHLORIDE 2 ML: 10 INJECTION, SOLUTION EPIDURAL; INFILTRATION; INTRACAUDAL; PERINEURAL at 11:00

## (undated) DEVICE — TUBING, SUCTION, 1/4" X 10', STRAIGHT: Brand: MEDLINE

## (undated) DEVICE — THE SINGLE USE ETRAP – POLYP TRAP IS USED FOR SUCTION RETRIEVAL OF ENDOSCOPICALLY REMOVED POLYPS.: Brand: ETRAP

## (undated) DEVICE — PK CATH CARD 40

## (undated) DEVICE — FLEX ADVANTAGE 1500CC: Brand: FLEX ADVANTAGE

## (undated) DEVICE — GLV SURG SENSICARE POLYISPRN W/ALOE PF LF 6.5 GRN STRL

## (undated) DEVICE — GUIDELINER CATHETERS ARE INTENDED TO BE USED IN CONJUNCTION WITH GUIDE CATHETERS TO ACCESS DISCRETE REGIONS OF THE CORONARY AND/OR PERIPHERAL VASCULATURE, AND TO FACILITATE PLACEMENT OF INTERVENTIONAL DEVICES.: Brand: GUIDELINER® V3 CATHETER

## (undated) DEVICE — CATH DIAG IMPULSE FR4 5F 100CM

## (undated) DEVICE — LASSO POLYPECTOMY SNARE: Brand: LASSO

## (undated) DEVICE — CANN O2 ETCO2 FITS ALL CONN CO2 SMPL A/ 7IN DISP LF

## (undated) DEVICE — CATH DIAG IMPULSE FL3.5 5F 100CM

## (undated) DEVICE — VIAL FORMLN CAP 10PCT 20ML

## (undated) DEVICE — UNDERCAST PADDING: Brand: DEROYAL

## (undated) DEVICE — NC TREK NEO™ CORONARY DILATATION CATHETER 4.00 MM X 20 MM / RAPID-EXCHANGE: Brand: NC TREK NEO™

## (undated) DEVICE — ADAPT CLN BIOGUARD AIR/H2O DISP

## (undated) DEVICE — ABL APOLLO RF M/PRT 50D

## (undated) DEVICE — GLIDESHEATH BASIC HYDROPHILIC COATED INTRODUCER SHEATH: Brand: GLIDESHEATH

## (undated) DEVICE — Device: Brand: DEFENDO AIR/WATER/SUCTION AND BIOPSY VALVE

## (undated) DEVICE — PINNACLE INTRODUCER SHEATH: Brand: PINNACLE

## (undated) DEVICE — KT MANIFLD CARDIAC

## (undated) DEVICE — SYRINGE, LUER SLIP, STERILE, 60ML: Brand: MEDLINE

## (undated) DEVICE — TREK CORONARY DILATATION CATHETER 3.50 MM X 20 MM / RAPID-EXCHANGE: Brand: TREK

## (undated) DEVICE — CANN NASL CO2 TRULINK W/O2 A/

## (undated) DEVICE — KT ORCA ORCAPOD DISP STRL

## (undated) DEVICE — GOWN ,SIRUS,NONREINFORCED 3XL: Brand: MEDLINE

## (undated) DEVICE — SUT ETHLN 3/0 PS1 18IN 1663H

## (undated) DEVICE — GLV SURG BIOGEL LTX PF 6 1/2

## (undated) DEVICE — GOWN ISOL W/THUMB UNIV BLU BX/15

## (undated) DEVICE — SKIN PREP TRAY W/CHG: Brand: MEDLINE INDUSTRIES, INC.

## (undated) DEVICE — Device

## (undated) DEVICE — SINGLE-USE BIOPSY FORCEPS: Brand: RADIAL JAW 4

## (undated) DEVICE — DRSNG WND GZ CURAD OIL EMULSION 3X3IN STRL

## (undated) DEVICE — ADAPT CLN SCPE ENDO PORPOISE BX/50 DISP

## (undated) DEVICE — PERCLOSE PROGLIDE™ SUTURE-MEDIATED CLOSURE SYSTEM: Brand: PERCLOSE PROGLIDE™

## (undated) DEVICE — CORONARY IMAGING CATHETER: Brand: OPTICROSS™ 6 HD

## (undated) DEVICE — 6F .070 JR 4 100CM: Brand: CORDIS

## (undated) DEVICE — LN SMPL CO2 SHTRM SD STREAM W/M LUER

## (undated) DEVICE — THE TORRENT IRRIGATION SCOPE CONNECTOR IS USED WITH THE TORRENT IRRIGATION TUBING TO PROVIDE IRRIGATION FLUIDS SUCH AS STERILE WATER DURING GASTROINTESTINAL ENDOSCOPIC PROCEDURES WHEN USED IN CONJUNCTION WITH AN IRRIGATION PUMP (OR ELECTROSURGICAL UNIT).: Brand: TORRENT

## (undated) DEVICE — SENSR O2 OXIMAX FNGR A/ 18IN NONSTR

## (undated) DEVICE — PK ARTHSCP 40

## (undated) DEVICE — BNDG ELAS ELITE V/CLOSE 4IN 5YD LF STRL

## (undated) DEVICE — GOWN,SIRUS,NON REINFRCD,LARGE,SET IN SL: Brand: MEDLINE

## (undated) DEVICE — GW HITORQUE/BAL MID/WT J W/HCOAT .014 3X190CM

## (undated) DEVICE — SNAR POLYP CAPTIVATOR RND STFF 2.4 240CM 10MM 1P/U

## (undated) DEVICE — BLCK/BITE BLOX W/DENTL/RIM W/STRAP 54F

## (undated) DEVICE — NC TREK NEO™ CORONARY DILATATION CATHETER 5.00 MM X 20 MM / RAPID-EXCHANGE: Brand: NC TREK NEO™

## (undated) DEVICE — DISPOSABLE TOURNIQUET CUFF SINGLE BLADDER, SINGLE PORT AND QUICK CONNECT CONNECTOR: Brand: COLOR CUFF

## (undated) DEVICE — DGW .035 FC J3MM 260CM TEF: Brand: EMERALD

## (undated) DEVICE — CATH VENT MIV RADL PIG ST TIP 4F 110CM

## (undated) DEVICE — TR BAND RADIAL ARTERY COMPRESSION DEVICE: Brand: TR BAND

## (undated) DEVICE — BLD DISSCT COOL CUT SJ CRVD 4MM 13CM